# Patient Record
Sex: MALE | Race: WHITE | NOT HISPANIC OR LATINO | Employment: FULL TIME | URBAN - METROPOLITAN AREA
[De-identification: names, ages, dates, MRNs, and addresses within clinical notes are randomized per-mention and may not be internally consistent; named-entity substitution may affect disease eponyms.]

---

## 2018-05-10 ENCOUNTER — APPOINTMENT (OUTPATIENT)
Dept: GENERAL RADIOLOGY | Facility: CLINIC | Age: 53
DRG: 246 | End: 2018-05-10
Attending: EMERGENCY MEDICINE
Payer: COMMERCIAL

## 2018-05-10 ENCOUNTER — APPOINTMENT (OUTPATIENT)
Dept: CARDIOLOGY | Facility: CLINIC | Age: 53
DRG: 246 | End: 2018-05-10
Attending: EMERGENCY MEDICINE
Payer: COMMERCIAL

## 2018-05-10 ENCOUNTER — HOSPITAL ENCOUNTER (INPATIENT)
Facility: CLINIC | Age: 53
LOS: 3 days | Discharge: HOME | DRG: 246 | End: 2018-05-13
Attending: EMERGENCY MEDICINE | Admitting: INTERNAL MEDICINE
Payer: COMMERCIAL

## 2018-05-10 DIAGNOSIS — I46.9 CARDIAC ARREST WITH VENTRICULAR FIBRILLATION (H): ICD-10-CM

## 2018-05-10 DIAGNOSIS — I49.01 CARDIAC ARREST WITH VENTRICULAR FIBRILLATION (H): ICD-10-CM

## 2018-05-10 DIAGNOSIS — I21.19 ST ELEVATION MYOCARDIAL INFARCTION (STEMI) INVOLVING OTHER CORONARY ARTERY OF INFERIOR WALL (H): ICD-10-CM

## 2018-05-10 DIAGNOSIS — I95.2 HYPOTENSION DUE TO DRUGS: Primary | ICD-10-CM

## 2018-05-10 PROBLEM — I21.3 STEMI (ST ELEVATION MYOCARDIAL INFARCTION) (H): Status: ACTIVE | Noted: 2018-05-10

## 2018-05-10 LAB
ANION GAP SERPL CALCULATED.3IONS-SCNC: 8 MMOL/L (ref 3–14)
BASOPHILS # BLD AUTO: 0 10E9/L (ref 0–0.2)
BASOPHILS NFR BLD AUTO: 0.5 %
BUN SERPL-MCNC: 22 MG/DL (ref 7–30)
CALCIUM SERPL-MCNC: 8.9 MG/DL (ref 8.5–10.1)
CHLORIDE SERPL-SCNC: 107 MMOL/L (ref 94–109)
CHOLEST SERPL-MCNC: 199 MG/DL
CO2 SERPL-SCNC: 25 MMOL/L (ref 20–32)
CREAT SERPL-MCNC: 1.13 MG/DL (ref 0.66–1.25)
DIFFERENTIAL METHOD BLD: NORMAL
EOSINOPHIL # BLD AUTO: 0.1 10E9/L (ref 0–0.7)
EOSINOPHIL NFR BLD AUTO: 2.4 %
ERYTHROCYTE [DISTWIDTH] IN BLOOD BY AUTOMATED COUNT: 12.3 % (ref 10–15)
GFR SERPL CREATININE-BSD FRML MDRD: 68 ML/MIN/1.7M2
GLUCOSE SERPL-MCNC: 143 MG/DL (ref 70–99)
HCT VFR BLD AUTO: 42.6 % (ref 40–53)
HDLC SERPL-MCNC: 60 MG/DL
HGB BLD-MCNC: 15.1 G/DL (ref 13.3–17.7)
IMM GRANULOCYTES # BLD: 0 10E9/L (ref 0–0.4)
IMM GRANULOCYTES NFR BLD: 0.2 %
INTERPRETATION ECG - MUSE: NORMAL
KCT BLD-ACNC: 164 SEC (ref 75–150)
KCT BLD-ACNC: 192 SEC (ref 75–150)
KCT BLD-ACNC: 196 SEC (ref 75–150)
KCT BLD-ACNC: 237 SEC (ref 75–150)
KCT BLD-ACNC: 282 SEC (ref 75–150)
KCT BLD-ACNC: 375 SEC (ref 75–150)
LDLC SERPL CALC-MCNC: 133 MG/DL
LYMPHOCYTES # BLD AUTO: 2.5 10E9/L (ref 0.8–5.3)
LYMPHOCYTES NFR BLD AUTO: 42.1 %
MCH RBC QN AUTO: 30 PG (ref 26.5–33)
MCHC RBC AUTO-ENTMCNC: 35.4 G/DL (ref 31.5–36.5)
MCV RBC AUTO: 85 FL (ref 78–100)
MONOCYTES # BLD AUTO: 0.5 10E9/L (ref 0–1.3)
MONOCYTES NFR BLD AUTO: 9.1 %
NEUTROPHILS # BLD AUTO: 2.7 10E9/L (ref 1.6–8.3)
NEUTROPHILS NFR BLD AUTO: 45.7 %
NONHDLC SERPL-MCNC: 139 MG/DL
NRBC # BLD AUTO: 0 10*3/UL
NRBC BLD AUTO-RTO: 0 /100
PLATELET # BLD AUTO: 332 10E9/L (ref 150–450)
POTASSIUM SERPL-SCNC: 3.2 MMOL/L (ref 3.4–5.3)
POTASSIUM SERPL-SCNC: 3.8 MMOL/L (ref 3.4–5.3)
RBC # BLD AUTO: 5.03 10E12/L (ref 4.4–5.9)
SODIUM SERPL-SCNC: 140 MMOL/L (ref 133–144)
TRIGL SERPL-MCNC: 28 MG/DL
TROPONIN I SERPL-MCNC: 103.13 UG/L (ref 0–0.04)
TROPONIN I SERPL-MCNC: 68.33 UG/L (ref 0–0.04)
TROPONIN I SERPL-MCNC: 72.58 UG/L (ref 0–0.04)
TROPONIN I SERPL-MCNC: <0.015 UG/L (ref 0–0.04)
WBC # BLD AUTO: 5.9 10E9/L (ref 4–11)

## 2018-05-10 PROCEDURE — 93010 ELECTROCARDIOGRAM REPORT: CPT | Performed by: INTERNAL MEDICINE

## 2018-05-10 PROCEDURE — 27211089 ZZH KIT ACIST INJECTOR CR3

## 2018-05-10 PROCEDURE — 92921 ZZHC PRQ TRLUML CORONARY ANGIOPLASTY ADDL BRANCH: CPT | Mod: LC | Performed by: INTERNAL MEDICINE

## 2018-05-10 PROCEDURE — C1725 CATH, TRANSLUMIN NON-LASER: HCPCS

## 2018-05-10 PROCEDURE — 99233 SBSQ HOSP IP/OBS HIGH 50: CPT | Mod: 25 | Performed by: INTERNAL MEDICINE

## 2018-05-10 PROCEDURE — 27210758 ZZH DEVICE COMPRESSION CR8

## 2018-05-10 PROCEDURE — 25000128 H RX IP 250 OP 636: Performed by: INTERNAL MEDICINE

## 2018-05-10 PROCEDURE — 84484 ASSAY OF TROPONIN QUANT: CPT | Performed by: EMERGENCY MEDICINE

## 2018-05-10 PROCEDURE — 5A2204Z RESTORATION OF CARDIAC RHYTHM, SINGLE: ICD-10-PCS | Performed by: EMERGENCY MEDICINE

## 2018-05-10 PROCEDURE — 93458 L HRT ARTERY/VENTRICLE ANGIO: CPT

## 2018-05-10 PROCEDURE — 93458 L HRT ARTERY/VENTRICLE ANGIO: CPT | Mod: 26 | Performed by: INTERNAL MEDICINE

## 2018-05-10 PROCEDURE — 96375 TX/PRO/DX INJ NEW DRUG ADDON: CPT

## 2018-05-10 PROCEDURE — 25000128 H RX IP 250 OP 636: Performed by: EMERGENCY MEDICINE

## 2018-05-10 PROCEDURE — 92978 ENDOLUMINL IVUS OCT C 1ST: CPT

## 2018-05-10 PROCEDURE — B2151ZZ FLUOROSCOPY OF LEFT HEART USING LOW OSMOLAR CONTRAST: ICD-10-PCS | Performed by: INTERNAL MEDICINE

## 2018-05-10 PROCEDURE — 85025 COMPLETE CBC W/AUTO DIFF WBC: CPT | Performed by: EMERGENCY MEDICINE

## 2018-05-10 PROCEDURE — 80061 LIPID PANEL: CPT | Performed by: INTERNAL MEDICINE

## 2018-05-10 PROCEDURE — 85347 COAGULATION TIME ACTIVATED: CPT

## 2018-05-10 PROCEDURE — 93010 ELECTROCARDIOGRAM REPORT: CPT | Mod: 77 | Performed by: INTERNAL MEDICINE

## 2018-05-10 PROCEDURE — 84132 ASSAY OF SERUM POTASSIUM: CPT | Performed by: INTERNAL MEDICINE

## 2018-05-10 PROCEDURE — 02C03ZZ EXTIRPATION OF MATTER FROM CORONARY ARTERY, ONE ARTERY, PERCUTANEOUS APPROACH: ICD-10-PCS | Performed by: INTERNAL MEDICINE

## 2018-05-10 PROCEDURE — 5A12012 PERFORMANCE OF CARDIAC OUTPUT, SINGLE, MANUAL: ICD-10-PCS | Performed by: EMERGENCY MEDICINE

## 2018-05-10 PROCEDURE — 92941 PRQ TRLML REVSC TOT OCCL AMI: CPT | Mod: LC | Performed by: INTERNAL MEDICINE

## 2018-05-10 PROCEDURE — C1769 GUIDE WIRE: HCPCS

## 2018-05-10 PROCEDURE — C1887 CATHETER, GUIDING: HCPCS

## 2018-05-10 PROCEDURE — 99291 CRITICAL CARE FIRST HOUR: CPT | Performed by: INTERNAL MEDICINE

## 2018-05-10 PROCEDURE — 21000000 ZZH R&B IMCU HEART CARE

## 2018-05-10 PROCEDURE — 25000132 ZZH RX MED GY IP 250 OP 250 PS 637: Performed by: INTERNAL MEDICINE

## 2018-05-10 PROCEDURE — 92978 ENDOLUMINL IVUS OCT C 1ST: CPT | Mod: 26 | Performed by: INTERNAL MEDICINE

## 2018-05-10 PROCEDURE — 93005 ELECTROCARDIOGRAM TRACING: CPT

## 2018-05-10 PROCEDURE — 80048 BASIC METABOLIC PNL TOTAL CA: CPT | Performed by: EMERGENCY MEDICINE

## 2018-05-10 PROCEDURE — B2111ZZ FLUOROSCOPY OF MULTIPLE CORONARY ARTERIES USING LOW OSMOLAR CONTRAST: ICD-10-PCS | Performed by: INTERNAL MEDICINE

## 2018-05-10 PROCEDURE — 3E073PZ INTRODUCTION OF PLATELET INHIBITOR INTO CORONARY ARTERY, PERCUTANEOUS APPROACH: ICD-10-PCS | Performed by: INTERNAL MEDICINE

## 2018-05-10 PROCEDURE — 71045 X-RAY EXAM CHEST 1 VIEW: CPT

## 2018-05-10 PROCEDURE — 25000125 ZZHC RX 250: Performed by: INTERNAL MEDICINE

## 2018-05-10 PROCEDURE — 27210795 ZZH PAD DEFIB QUICK CR4

## 2018-05-10 PROCEDURE — C1874 STENT, COATED/COV W/DEL SYS: HCPCS

## 2018-05-10 PROCEDURE — 99152 MOD SED SAME PHYS/QHP 5/>YRS: CPT

## 2018-05-10 PROCEDURE — B221Z2Z COMPUTERIZED TOMOGRAPHY (CT SCAN) OF MULTIPLE CORONARY ARTERIES USING INTRAVASCULAR OPTICAL COHERENCE: ICD-10-PCS | Performed by: INTERNAL MEDICINE

## 2018-05-10 PROCEDURE — 99152 MOD SED SAME PHYS/QHP 5/>YRS: CPT | Mod: GC | Performed by: INTERNAL MEDICINE

## 2018-05-10 PROCEDURE — 02703ZZ DILATION OF CORONARY ARTERY, ONE ARTERY, PERCUTANEOUS APPROACH: ICD-10-PCS | Performed by: INTERNAL MEDICINE

## 2018-05-10 PROCEDURE — 99292 CRITICAL CARE ADDL 30 MIN: CPT | Performed by: INTERNAL MEDICINE

## 2018-05-10 PROCEDURE — 27210946 ZZH KIT HC TOTES DISP CR8

## 2018-05-10 PROCEDURE — 027034Z DILATION OF CORONARY ARTERY, ONE ARTERY WITH DRUG-ELUTING INTRALUMINAL DEVICE, PERCUTANEOUS APPROACH: ICD-10-PCS | Performed by: INTERNAL MEDICINE

## 2018-05-10 PROCEDURE — 92921 ZZHC PRQ TRLUML CORONARY ANGIOPLASTY ADDL BRANCH: CPT | Mod: LC

## 2018-05-10 PROCEDURE — 27211049 ZZH CATH OCT CR21

## 2018-05-10 PROCEDURE — C1757 CATH, THROMBECTOMY/EMBOLECT: HCPCS

## 2018-05-10 PROCEDURE — 27210759 ZZH DEVICE INFLATION CR6

## 2018-05-10 PROCEDURE — 99285 EMERGENCY DEPT VISIT HI MDM: CPT | Mod: 25

## 2018-05-10 PROCEDURE — 99153 MOD SED SAME PHYS/QHP EA: CPT

## 2018-05-10 PROCEDURE — 84484 ASSAY OF TROPONIN QUANT: CPT | Performed by: INTERNAL MEDICINE

## 2018-05-10 PROCEDURE — 4A023N7 MEASUREMENT OF CARDIAC SAMPLING AND PRESSURE, LEFT HEART, PERCUTANEOUS APPROACH: ICD-10-PCS | Performed by: INTERNAL MEDICINE

## 2018-05-10 PROCEDURE — 96374 THER/PROPH/DIAG INJ IV PUSH: CPT

## 2018-05-10 PROCEDURE — C9606 PERC D-E COR REVASC W AMI S: HCPCS | Mod: LC

## 2018-05-10 PROCEDURE — 36415 COLL VENOUS BLD VENIPUNCTURE: CPT | Performed by: INTERNAL MEDICINE

## 2018-05-10 RX ORDER — DOPAMINE HYDROCHLORIDE 160 MG/100ML
2-20 INJECTION, SOLUTION INTRAVENOUS CONTINUOUS PRN
Status: DISCONTINUED | OUTPATIENT
Start: 2018-05-10 | End: 2018-05-10 | Stop reason: HOSPADM

## 2018-05-10 RX ORDER — POTASSIUM CHLORIDE 7.45 MG/ML
10 INJECTION INTRAVENOUS
Status: DISCONTINUED | OUTPATIENT
Start: 2018-05-10 | End: 2018-05-10 | Stop reason: HOSPADM

## 2018-05-10 RX ORDER — FUROSEMIDE 10 MG/ML
20-100 INJECTION INTRAMUSCULAR; INTRAVENOUS
Status: DISCONTINUED | OUTPATIENT
Start: 2018-05-10 | End: 2018-05-10 | Stop reason: HOSPADM

## 2018-05-10 RX ORDER — METHYLPREDNISOLONE SODIUM SUCCINATE 125 MG/2ML
125 INJECTION, POWDER, LYOPHILIZED, FOR SOLUTION INTRAMUSCULAR; INTRAVENOUS
Status: DISCONTINUED | OUTPATIENT
Start: 2018-05-10 | End: 2018-05-10 | Stop reason: HOSPADM

## 2018-05-10 RX ORDER — HEPARIN SODIUM 1000 [USP'U]/ML
1000-10000 INJECTION, SOLUTION INTRAVENOUS; SUBCUTANEOUS EVERY 5 MIN PRN
Status: DISCONTINUED | OUTPATIENT
Start: 2018-05-10 | End: 2018-05-10 | Stop reason: HOSPADM

## 2018-05-10 RX ORDER — POTASSIUM CHLORIDE 29.8 MG/ML
20 INJECTION INTRAVENOUS
Status: DISCONTINUED | OUTPATIENT
Start: 2018-05-10 | End: 2018-05-10 | Stop reason: HOSPADM

## 2018-05-10 RX ORDER — SODIUM CHLORIDE 9 MG/ML
INJECTION, SOLUTION INTRAVENOUS CONTINUOUS
Status: ACTIVE | OUTPATIENT
Start: 2018-05-10 | End: 2018-05-10

## 2018-05-10 RX ORDER — LIDOCAINE HYDROCHLORIDE 10 MG/ML
1-10 INJECTION, SOLUTION EPIDURAL; INFILTRATION; INTRACAUDAL; PERINEURAL
Status: COMPLETED | OUTPATIENT
Start: 2018-05-10 | End: 2018-05-10

## 2018-05-10 RX ORDER — ACETAMINOPHEN 325 MG/1
325-650 TABLET ORAL EVERY 4 HOURS PRN
Status: DISCONTINUED | OUTPATIENT
Start: 2018-05-10 | End: 2018-05-13 | Stop reason: HOSPADM

## 2018-05-10 RX ORDER — LISINOPRIL 5 MG/1
5 TABLET ORAL DAILY
Status: DISCONTINUED | OUTPATIENT
Start: 2018-05-10 | End: 2018-05-13 | Stop reason: HOSPADM

## 2018-05-10 RX ORDER — FENTANYL CITRATE 50 UG/ML
25-50 INJECTION, SOLUTION INTRAMUSCULAR; INTRAVENOUS
Status: DISCONTINUED | OUTPATIENT
Start: 2018-05-10 | End: 2018-05-10 | Stop reason: HOSPADM

## 2018-05-10 RX ORDER — FLUMAZENIL 0.1 MG/ML
0.2 INJECTION, SOLUTION INTRAVENOUS
Status: ACTIVE | OUTPATIENT
Start: 2018-05-10 | End: 2018-05-10

## 2018-05-10 RX ORDER — DOBUTAMINE HYDROCHLORIDE 200 MG/100ML
2-20 INJECTION INTRAVENOUS CONTINUOUS PRN
Status: DISCONTINUED | OUTPATIENT
Start: 2018-05-10 | End: 2018-05-10 | Stop reason: HOSPADM

## 2018-05-10 RX ORDER — ONDANSETRON 2 MG/ML
4 INJECTION INTRAMUSCULAR; INTRAVENOUS EVERY 4 HOURS PRN
Status: DISCONTINUED | OUTPATIENT
Start: 2018-05-10 | End: 2018-05-10 | Stop reason: HOSPADM

## 2018-05-10 RX ORDER — SODIUM NITROPRUSSIDE 25 MG/ML
100-200 INJECTION INTRAVENOUS
Status: DISCONTINUED | OUTPATIENT
Start: 2018-05-10 | End: 2018-05-10 | Stop reason: HOSPADM

## 2018-05-10 RX ORDER — VERAPAMIL HYDROCHLORIDE 2.5 MG/ML
1-2.5 INJECTION, SOLUTION INTRAVENOUS
Status: DISCONTINUED | OUTPATIENT
Start: 2018-05-10 | End: 2018-05-10 | Stop reason: HOSPADM

## 2018-05-10 RX ORDER — FLUMAZENIL 0.1 MG/ML
0.2 INJECTION, SOLUTION INTRAVENOUS
Status: DISCONTINUED | OUTPATIENT
Start: 2018-05-10 | End: 2018-05-10 | Stop reason: HOSPADM

## 2018-05-10 RX ORDER — MORPHINE SULFATE 4 MG/ML
2-4 INJECTION, SOLUTION INTRAMUSCULAR; INTRAVENOUS EVERY 4 HOURS PRN
Status: DISCONTINUED | OUTPATIENT
Start: 2018-05-10 | End: 2018-05-13 | Stop reason: HOSPADM

## 2018-05-10 RX ORDER — DIPHENHYDRAMINE HYDROCHLORIDE 50 MG/ML
25-50 INJECTION INTRAMUSCULAR; INTRAVENOUS
Status: DISCONTINUED | OUTPATIENT
Start: 2018-05-10 | End: 2018-05-10 | Stop reason: HOSPADM

## 2018-05-10 RX ORDER — NITROGLYCERIN 5 MG/ML
100-500 VIAL (ML) INTRAVENOUS
Status: DISCONTINUED | OUTPATIENT
Start: 2018-05-10 | End: 2018-05-10 | Stop reason: HOSPADM

## 2018-05-10 RX ORDER — PRASUGREL 10 MG/1
10-60 TABLET, FILM COATED ORAL
Status: DISCONTINUED | OUTPATIENT
Start: 2018-05-10 | End: 2018-05-10 | Stop reason: HOSPADM

## 2018-05-10 RX ORDER — CLOPIDOGREL 300 MG/1
300-600 TABLET, FILM COATED ORAL
Status: DISCONTINUED | OUTPATIENT
Start: 2018-05-10 | End: 2018-05-10 | Stop reason: HOSPADM

## 2018-05-10 RX ORDER — PHENYLEPHRINE HCL IN 0.9% NACL 1 MG/10 ML
20-100 SYRINGE (ML) INTRAVENOUS
Status: DISCONTINUED | OUTPATIENT
Start: 2018-05-10 | End: 2018-05-10 | Stop reason: HOSPADM

## 2018-05-10 RX ORDER — ATROPINE SULFATE 0.1 MG/ML
0.5 INJECTION INTRAVENOUS EVERY 5 MIN PRN
Status: DISPENSED | OUTPATIENT
Start: 2018-05-10 | End: 2018-05-10

## 2018-05-10 RX ORDER — ADENOSINE 3 MG/ML
12-12000 INJECTION, SOLUTION INTRAVENOUS
Status: DISCONTINUED | OUTPATIENT
Start: 2018-05-10 | End: 2018-05-10 | Stop reason: HOSPADM

## 2018-05-10 RX ORDER — EPTIFIBATIDE 2 MG/ML
180 INJECTION, SOLUTION INTRAVENOUS ONCE
Status: COMPLETED | OUTPATIENT
Start: 2018-05-10 | End: 2018-05-10

## 2018-05-10 RX ORDER — LORAZEPAM 2 MG/ML
.5-2 INJECTION INTRAMUSCULAR EVERY 4 HOURS PRN
Status: DISCONTINUED | OUTPATIENT
Start: 2018-05-10 | End: 2018-05-10 | Stop reason: HOSPADM

## 2018-05-10 RX ORDER — ASPIRIN 81 MG/1
81 TABLET ORAL DAILY
Status: DISCONTINUED | OUTPATIENT
Start: 2018-05-11 | End: 2018-05-13 | Stop reason: HOSPADM

## 2018-05-10 RX ORDER — DEXTROSE MONOHYDRATE 25 G/50ML
12.5-5 INJECTION, SOLUTION INTRAVENOUS EVERY 30 MIN PRN
Status: DISCONTINUED | OUTPATIENT
Start: 2018-05-10 | End: 2018-05-10 | Stop reason: HOSPADM

## 2018-05-10 RX ORDER — ASPIRIN 81 MG/1
81-324 TABLET, CHEWABLE ORAL
Status: DISCONTINUED | OUTPATIENT
Start: 2018-05-10 | End: 2018-05-10 | Stop reason: HOSPADM

## 2018-05-10 RX ORDER — LIDOCAINE HYDROCHLORIDE 10 MG/ML
30 INJECTION, SOLUTION EPIDURAL; INFILTRATION; INTRACAUDAL; PERINEURAL
Status: DISCONTINUED | OUTPATIENT
Start: 2018-05-10 | End: 2018-05-10 | Stop reason: HOSPADM

## 2018-05-10 RX ORDER — PROMETHAZINE HYDROCHLORIDE 25 MG/ML
6.25-25 INJECTION, SOLUTION INTRAMUSCULAR; INTRAVENOUS EVERY 4 HOURS PRN
Status: DISCONTINUED | OUTPATIENT
Start: 2018-05-10 | End: 2018-05-10 | Stop reason: HOSPADM

## 2018-05-10 RX ORDER — PROTAMINE SULFATE 10 MG/ML
25-100 INJECTION, SOLUTION INTRAVENOUS EVERY 5 MIN PRN
Status: DISCONTINUED | OUTPATIENT
Start: 2018-05-10 | End: 2018-05-10 | Stop reason: HOSPADM

## 2018-05-10 RX ORDER — BUPIVACAINE HYDROCHLORIDE 2.5 MG/ML
1-10 INJECTION, SOLUTION EPIDURAL; INFILTRATION; INTRACAUDAL
Status: DISCONTINUED | OUTPATIENT
Start: 2018-05-10 | End: 2018-05-10 | Stop reason: HOSPADM

## 2018-05-10 RX ORDER — ENALAPRILAT 1.25 MG/ML
1.25-2.5 INJECTION INTRAVENOUS
Status: DISCONTINUED | OUTPATIENT
Start: 2018-05-10 | End: 2018-05-10 | Stop reason: HOSPADM

## 2018-05-10 RX ORDER — HYDROCODONE BITARTRATE AND ACETAMINOPHEN 5; 325 MG/1; MG/1
1-2 TABLET ORAL EVERY 4 HOURS PRN
Status: DISCONTINUED | OUTPATIENT
Start: 2018-05-10 | End: 2018-05-10 | Stop reason: CLARIF

## 2018-05-10 RX ORDER — ASPIRIN 81 MG/1
324 TABLET, CHEWABLE ORAL ONCE
Status: DISCONTINUED | OUTPATIENT
Start: 2018-05-10 | End: 2018-05-10

## 2018-05-10 RX ORDER — HYDRALAZINE HYDROCHLORIDE 20 MG/ML
10-20 INJECTION INTRAMUSCULAR; INTRAVENOUS
Status: DISCONTINUED | OUTPATIENT
Start: 2018-05-10 | End: 2018-05-10 | Stop reason: HOSPADM

## 2018-05-10 RX ORDER — MORPHINE SULFATE 4 MG/ML
4 INJECTION, SOLUTION INTRAMUSCULAR; INTRAVENOUS ONCE
Status: COMPLETED | OUTPATIENT
Start: 2018-05-10 | End: 2018-05-10

## 2018-05-10 RX ORDER — CLOPIDOGREL BISULFATE 75 MG/1
75 TABLET ORAL
Status: DISCONTINUED | OUTPATIENT
Start: 2018-05-10 | End: 2018-05-10 | Stop reason: HOSPADM

## 2018-05-10 RX ORDER — NIFEDIPINE 10 MG/1
10 CAPSULE ORAL
Status: DISCONTINUED | OUTPATIENT
Start: 2018-05-10 | End: 2018-05-10 | Stop reason: HOSPADM

## 2018-05-10 RX ORDER — NALOXONE HYDROCHLORIDE 0.4 MG/ML
.1-.4 INJECTION, SOLUTION INTRAMUSCULAR; INTRAVENOUS; SUBCUTANEOUS
Status: DISCONTINUED | OUTPATIENT
Start: 2018-05-10 | End: 2018-05-13 | Stop reason: HOSPADM

## 2018-05-10 RX ORDER — POTASSIUM CHLORIDE 1500 MG/1
20-40 TABLET, EXTENDED RELEASE ORAL
Status: DISCONTINUED | OUTPATIENT
Start: 2018-05-10 | End: 2018-05-13 | Stop reason: HOSPADM

## 2018-05-10 RX ORDER — MORPHINE SULFATE 2 MG/ML
1-2 INJECTION, SOLUTION INTRAMUSCULAR; INTRAVENOUS EVERY 5 MIN PRN
Status: DISCONTINUED | OUTPATIENT
Start: 2018-05-10 | End: 2018-05-10 | Stop reason: HOSPADM

## 2018-05-10 RX ORDER — CARVEDILOL 3.12 MG/1
3.12 TABLET ORAL ONCE
Status: COMPLETED | OUTPATIENT
Start: 2018-05-10 | End: 2018-05-10

## 2018-05-10 RX ORDER — POTASSIUM CHLORIDE 7.45 MG/ML
10 INJECTION INTRAVENOUS
Status: DISCONTINUED | OUTPATIENT
Start: 2018-05-10 | End: 2018-05-13 | Stop reason: HOSPADM

## 2018-05-10 RX ORDER — NICARDIPINE HYDROCHLORIDE 2.5 MG/ML
100 INJECTION INTRAVENOUS
Status: DISCONTINUED | OUTPATIENT
Start: 2018-05-10 | End: 2018-05-10 | Stop reason: HOSPADM

## 2018-05-10 RX ORDER — NALOXONE HYDROCHLORIDE 0.4 MG/ML
0.4 INJECTION, SOLUTION INTRAMUSCULAR; INTRAVENOUS; SUBCUTANEOUS EVERY 5 MIN PRN
Status: DISCONTINUED | OUTPATIENT
Start: 2018-05-10 | End: 2018-05-10 | Stop reason: HOSPADM

## 2018-05-10 RX ORDER — ASPIRIN 325 MG
325 TABLET ORAL
Status: DISCONTINUED | OUTPATIENT
Start: 2018-05-10 | End: 2018-05-10 | Stop reason: HOSPADM

## 2018-05-10 RX ORDER — METOPROLOL TARTRATE 1 MG/ML
5 INJECTION, SOLUTION INTRAVENOUS EVERY 5 MIN PRN
Status: DISCONTINUED | OUTPATIENT
Start: 2018-05-10 | End: 2018-05-10 | Stop reason: HOSPADM

## 2018-05-10 RX ORDER — NITROGLYCERIN 20 MG/100ML
0.07-2 INJECTION INTRAVENOUS CONTINUOUS
Status: DISCONTINUED | OUTPATIENT
Start: 2018-05-10 | End: 2018-05-11

## 2018-05-10 RX ORDER — ATROPINE SULFATE 0.1 MG/ML
.5-1 INJECTION INTRAVENOUS
Status: DISCONTINUED | OUTPATIENT
Start: 2018-05-10 | End: 2018-05-10 | Stop reason: HOSPADM

## 2018-05-10 RX ORDER — FENTANYL CITRATE 50 UG/ML
25-50 INJECTION, SOLUTION INTRAMUSCULAR; INTRAVENOUS
Status: DISPENSED | OUTPATIENT
Start: 2018-05-10 | End: 2018-05-10

## 2018-05-10 RX ORDER — POTASSIUM CHLORIDE 29.8 MG/ML
20 INJECTION INTRAVENOUS
Status: DISCONTINUED | OUTPATIENT
Start: 2018-05-10 | End: 2018-05-13 | Stop reason: HOSPADM

## 2018-05-10 RX ORDER — EPINEPHRINE 1 MG/ML
0.3 INJECTION, SOLUTION, CONCENTRATE INTRAVENOUS
Status: DISCONTINUED | OUTPATIENT
Start: 2018-05-10 | End: 2018-05-10 | Stop reason: HOSPADM

## 2018-05-10 RX ORDER — CARVEDILOL 3.12 MG/1
3.12 TABLET ORAL 2 TIMES DAILY
Status: DISCONTINUED | OUTPATIENT
Start: 2018-05-10 | End: 2018-05-10

## 2018-05-10 RX ORDER — NITROGLYCERIN 0.4 MG/1
0.4 TABLET SUBLINGUAL EVERY 5 MIN PRN
Status: DISCONTINUED | OUTPATIENT
Start: 2018-05-10 | End: 2018-05-10 | Stop reason: HOSPADM

## 2018-05-10 RX ORDER — CARVEDILOL 6.25 MG/1
6.25 TABLET ORAL 2 TIMES DAILY
Status: DISCONTINUED | OUTPATIENT
Start: 2018-05-11 | End: 2018-05-12

## 2018-05-10 RX ORDER — NITROGLYCERIN 5 MG/ML
100-200 VIAL (ML) INTRAVENOUS
Status: DISCONTINUED | OUTPATIENT
Start: 2018-05-10 | End: 2018-05-10 | Stop reason: HOSPADM

## 2018-05-10 RX ORDER — POTASSIUM CHLORIDE 1.5 G/1.58G
20-40 POWDER, FOR SOLUTION ORAL
Status: DISCONTINUED | OUTPATIENT
Start: 2018-05-10 | End: 2018-05-13 | Stop reason: HOSPADM

## 2018-05-10 RX ORDER — POTASSIUM CL/LIDO/0.9 % NACL 10MEQ/0.1L
10 INTRAVENOUS SOLUTION, PIGGYBACK (ML) INTRAVENOUS
Status: DISCONTINUED | OUTPATIENT
Start: 2018-05-10 | End: 2018-05-13 | Stop reason: HOSPADM

## 2018-05-10 RX ORDER — NITROGLYCERIN 20 MG/100ML
.07-2 INJECTION INTRAVENOUS CONTINUOUS PRN
Status: DISCONTINUED | OUTPATIENT
Start: 2018-05-10 | End: 2018-05-10 | Stop reason: HOSPADM

## 2018-05-10 RX ORDER — POTASSIUM CHLORIDE 7.45 MG/ML
10 INJECTION INTRAVENOUS ONCE
Status: DISCONTINUED | OUTPATIENT
Start: 2018-05-10 | End: 2018-05-10

## 2018-05-10 RX ORDER — ROSUVASTATIN CALCIUM 20 MG/1
20 TABLET, COATED ORAL DAILY
Status: DISCONTINUED | OUTPATIENT
Start: 2018-05-10 | End: 2018-05-13 | Stop reason: HOSPADM

## 2018-05-10 RX ORDER — PROTAMINE SULFATE 10 MG/ML
1-5 INJECTION, SOLUTION INTRAVENOUS
Status: DISCONTINUED | OUTPATIENT
Start: 2018-05-10 | End: 2018-05-10 | Stop reason: HOSPADM

## 2018-05-10 RX ORDER — NITROGLYCERIN 0.4 MG/1
0.4 TABLET SUBLINGUAL EVERY 5 MIN PRN
Status: DISCONTINUED | OUTPATIENT
Start: 2018-05-10 | End: 2018-05-13 | Stop reason: HOSPADM

## 2018-05-10 RX ORDER — IOPAMIDOL 755 MG/ML
275 INJECTION, SOLUTION INTRAVASCULAR ONCE
Status: COMPLETED | OUTPATIENT
Start: 2018-05-10 | End: 2018-05-10

## 2018-05-10 RX ORDER — POTASSIUM CL/LIDO/0.9 % NACL 10MEQ/0.1L
10 INTRAVENOUS SOLUTION, PIGGYBACK (ML) INTRAVENOUS ONCE
Status: COMPLETED | OUTPATIENT
Start: 2018-05-10 | End: 2018-05-10

## 2018-05-10 RX ORDER — NALOXONE HYDROCHLORIDE 0.4 MG/ML
.2-.4 INJECTION, SOLUTION INTRAMUSCULAR; INTRAVENOUS; SUBCUTANEOUS
Status: DISCONTINUED | OUTPATIENT
Start: 2018-05-10 | End: 2018-05-10

## 2018-05-10 RX ADMIN — EPTIFIBATIDE 16980 MCG: 2 INJECTION, SOLUTION INTRAVENOUS at 09:39

## 2018-05-10 RX ADMIN — FENTANYL CITRATE 25 MCG: 50 INJECTION, SOLUTION INTRAMUSCULAR; INTRAVENOUS at 09:49

## 2018-05-10 RX ADMIN — CARVEDILOL 3.12 MG: 3.12 TABLET, FILM COATED ORAL at 20:22

## 2018-05-10 RX ADMIN — POTASSIUM CHLORIDE 40 MEQ: 1500 TABLET, EXTENDED RELEASE ORAL at 13:53

## 2018-05-10 RX ADMIN — SODIUM NITROPRUSSIDE 100 MCG: 25 INJECTION INTRAVENOUS at 09:44

## 2018-05-10 RX ADMIN — TICAGRELOR 90 MG: 90 TABLET ORAL at 20:22

## 2018-05-10 RX ADMIN — LIDOCAINE HYDROCHLORIDE 10 ML: 10 INJECTION, SOLUTION EPIDURAL; INFILTRATION; INTRACAUDAL; PERINEURAL at 08:30

## 2018-05-10 RX ADMIN — ONDANSETRON 4 MG: 2 INJECTION INTRAMUSCULAR; INTRAVENOUS at 08:39

## 2018-05-10 RX ADMIN — HYDROCODONE BITARTRATE AND ACETAMINOPHEN 1 TABLET: 5; 325 TABLET ORAL at 15:37

## 2018-05-10 RX ADMIN — HYDROCODONE BITARTRATE AND ACETAMINOPHEN 1 TABLET: 5; 325 TABLET ORAL at 13:02

## 2018-05-10 RX ADMIN — FENTANYL CITRATE 25 MCG: 50 INJECTION, SOLUTION INTRAMUSCULAR; INTRAVENOUS at 09:16

## 2018-05-10 RX ADMIN — HEPARIN SODIUM 9000 UNITS: 1000 INJECTION, SOLUTION INTRAVENOUS; SUBCUTANEOUS at 08:38

## 2018-05-10 RX ADMIN — HEPARIN SODIUM 4000 UNITS: 1000 INJECTION, SOLUTION INTRAVENOUS; SUBCUTANEOUS at 09:32

## 2018-05-10 RX ADMIN — FENTANYL CITRATE 50 MCG: 50 INJECTION, SOLUTION INTRAMUSCULAR; INTRAVENOUS at 08:41

## 2018-05-10 RX ADMIN — HEPARIN SODIUM 6000 UNITS: 1000 INJECTION, SOLUTION INTRAVENOUS; SUBCUTANEOUS at 08:10

## 2018-05-10 RX ADMIN — ROSUVASTATIN CALCIUM 20 MG: 20 TABLET, FILM COATED ORAL at 20:25

## 2018-05-10 RX ADMIN — MORPHINE SULFATE 2 MG: 4 INJECTION, SOLUTION INTRAMUSCULAR; INTRAVENOUS at 21:11

## 2018-05-10 RX ADMIN — SODIUM NITROPRUSSIDE 200 MCG: 25 INJECTION INTRAVENOUS at 09:09

## 2018-05-10 RX ADMIN — IOPAMIDOL 275 ML: 755 INJECTION, SOLUTION INTRAVASCULAR at 10:00

## 2018-05-10 RX ADMIN — FENTANYL CITRATE 50 MCG: 50 INJECTION, SOLUTION INTRAMUSCULAR; INTRAVENOUS at 09:35

## 2018-05-10 RX ADMIN — ADENOSINE 480 MCG: 3 INJECTION, SOLUTION INTRAVENOUS at 09:09

## 2018-05-10 RX ADMIN — FENTANYL CITRATE 25 MCG: 50 INJECTION INTRAMUSCULAR; INTRAVENOUS at 14:26

## 2018-05-10 RX ADMIN — POTASSIUM CHLORIDE 20 MEQ: 1500 TABLET, EXTENDED RELEASE ORAL at 16:24

## 2018-05-10 RX ADMIN — MORPHINE SULFATE 4 MG: 4 INJECTION, SOLUTION INTRAMUSCULAR; INTRAVENOUS at 08:07

## 2018-05-10 RX ADMIN — ADENOSINE 240 MCG: 3 INJECTION, SOLUTION INTRAVENOUS at 09:44

## 2018-05-10 RX ADMIN — MIDAZOLAM 1 MG: 1 INJECTION INTRAMUSCULAR; INTRAVENOUS at 08:57

## 2018-05-10 RX ADMIN — LISINOPRIL 5 MG: 5 TABLET ORAL at 12:33

## 2018-05-10 RX ADMIN — TICAGRELOR 180 MG: 90 TABLET ORAL at 08:30

## 2018-05-10 RX ADMIN — METOPROLOL TARTRATE 1 MG: 5 INJECTION INTRAVENOUS at 08:44

## 2018-05-10 RX ADMIN — FENTANYL CITRATE 50 MCG: 50 INJECTION, SOLUTION INTRAMUSCULAR; INTRAVENOUS at 08:25

## 2018-05-10 RX ADMIN — CARVEDILOL 3.12 MG: 3.12 TABLET, FILM COATED ORAL at 21:11

## 2018-05-10 RX ADMIN — NITROGLYCERIN 0.07 MCG/KG/MIN: 20 INJECTION INTRAVENOUS at 17:45

## 2018-05-10 RX ADMIN — POTASSIUM CHLORIDE 10 MEQ: 149 INJECTION, SOLUTION, CONCENTRATE INTRAVENOUS at 09:33

## 2018-05-10 RX ADMIN — CARVEDILOL 3.12 MG: 3.12 TABLET, FILM COATED ORAL at 12:32

## 2018-05-10 ASSESSMENT — PAIN DESCRIPTION - DESCRIPTORS
DESCRIPTORS: PRESSURE

## 2018-05-10 ASSESSMENT — ENCOUNTER SYMPTOMS: SHORTNESS OF BREATH: 1

## 2018-05-10 NOTE — PROGRESS NOTES
Service accept note.  Discussed with Dr. Carter and discussed with patient and his extended family.  Patient either had ruptured plaque and thrombosis versus demand ischemia driven thrombosis when exercising to a high level today.  OM 2 and OM 1 revascularized.  Slow reflow likely due to microvascular damage.  Will medically manage through infarct.  Sizable troponin elevation.  Likely will have prominent lateral wall damage.  Discussed with the patient that he may be fairly asymptomatic from the damage in the long-term.  Will keep his blood pressures down and maximize medical management to help remodeling.  Will also keep track of his mitral valve to avoid ischemic mitral regurgitation and LV dilatation.  Will continue to follow patient while he is here.  Ongoing ventricular arrhythmias, if recurrent ventricular tachycardia may consider either increasing beta blockers or adding amiodarone.    Over 40 minutes spent total in counseling and coordination of patient care including chart review and review of angiogram

## 2018-05-10 NOTE — IP AVS SNAPSHOT
[facility]    [address] [place] [address]., [address]    [place] [address]    Phone:  [phone]                                       After Visit Summary   5/10/2018    Braulio Bunch    MRN: [id number]           After Visit Summary Signature Page     I have received my discharge instructions, and my questions have been answered. I have discussed any challenges I see with this plan with the nurse or doctor.    ..........................................................................................................................................  Patient/Patient Representative Signature      ..........................................................................................................................................  Patient Representative Print Name and Relationship to Patient    ..................................................               ................................................  Date                                            Time    ..........................................................................................................................................  Reviewed by Signature/Title    ...................................................              ..............................................  Date                                                            Time

## 2018-05-10 NOTE — IP AVS SNAPSHOT
MRN:[id number]                      After Visit Summary   5/10/2018    Braulio Bunch    MRN: [id number]           Thank you!     Thank you for choosing [facility] for your care. Our goal is always to provide you with excellent care. Hearing back from our patients is one way we can continue to improve our services. Please take a few minutes to complete the written survey that you may receive in the mail after you visit with us. Thank you!        Patient Information     Date Of Birth          1965        Designated Caregiver       Most Recent Value    Caregiver    Will someone help with your care after discharge? yes    Name of designated caregiver Cande     Phone number of caregiver see chart     Caregiver address see chart       About your hospital stay     You were admitted on:  May 10, 2018 You last received care in the:  [facility]    You were discharged on:  May 13, 2018       Who to Call     For medical emergencies, please call 911.  For non-urgent questions about your medical care, please call your primary care provider or clinic, [phone]          Attending Provider     Provider Specialty    Carson Raymundo DO Emergency Medicine    Man Shetty MD Cardiology    [facility], Braulio ULRICH MD Cardiology       Primary Care Provider Office Phone # Fax #    Josué Vaughn [phone]      After Care Instructions     Activity       Your activity upon discharge: activity as tolerated            Diet       Follow this diet upon discharge: Orders Placed This Encounter      Low Saturated Fat Na <2400 mg                  Follow-up Appointments     Follow-up and recommended labs and tests        Follow up with primary care provider, Josué Vaughn, within 7-14 days, for hospital follow- up. No follow up labs or test are needed.                  Your next 10 appointments already scheduled     May 15, 2018 10:00 AM CDT   Cardiac  Evaluation with  CARDIAC REHAB 5   [facility] ([facility])    [address]  [place] [address]   [phone]            May 25, 2018  9:50 AM CDT   LAB with [facility]   [facility] [place] ([facility])    [address]  [place]  [place] [address]   [phone]           Please do not eat 10-12 hours before your appointment if you are coming in fasting for labs on lipids, cholesterol, or glucose (sugar). This does not apply to pregnant women. Water, hot tea and black coffee (with nothing added) are okay. Do not drink other fluids, diet soda or chew gum.            May 25, 2018 10:50 AM CDT   Return Discharge with ENRIKE Crocker CNP   [facility] ([facility])    [address]  [place] [address] [phone] OPT 2            Jul 30, 2018  8:15 AM CDT   Return Visit with Jeramy Carter MD   [facility] ([facility])    [address]  [place] [address] [phone] OPT 2              Additional Services     CARDIAC REHAB REFERRAL       Patient may choose their preference of the site for Cardiac Rehab.            Follow-Up with Cardiac Advanced Practice Provider           Follow-Up with Cardiologist                 Future tests that were ordered for you     Basic metabolic panel                 Further instructions from your care team       Follow up with primary care doctor in 7-10 days. Inform of this hospitalization and new medications.    Pending Results     Date and Time Order Name Status Description    5/11/2018 1536 EKG 12-lead, complete Preliminary     5/11/2018 0814 EKG 12-lead, tracing only Preliminary     5/11/2018 0509 EKG 12-lead, tracing only - STAT Preliminary     5/10/2018 1220 EKG 12-lead, tracing only Preliminary     5/10/2018 1038 EKG  "12-lead, tracing only  Preliminary             Statement of Approval     Ordered          18 0934  I have reviewed and agree with all the recommendations and orders detailed in this document.  EFFECTIVE NOW     Approved and electronically signed by:  Braulio Reyes MD             Admission Information     Date & Time Provider Department Dept. Phone    5/10/2018 Braulio Reyes MD [facility] [phone]      Your Vitals Were     Blood Pressure Pulse Temperature Respirations Height Weight    113/70 63 98.5  F (36.9  C) (Oral) 16 1.854 m (6' 1\") 90.2 kg (198 lb 14.4 oz)    Pulse Oximetry BMI (Body Mass Index)                95% 26.24 kg/m2          MyChart Information     [employer] lets you send messages to your doctor, view your test results, renew your prescriptions, schedule appointments and more. To sign up, go to [email or web address] . Click on \"Log in\" on the left side of the screen, which will take you to the Welcome page. Then click on \"Sign up Now\" on the right side of the page.     You will be asked to enter the access code listed below, as well as some personal information. Please follow the directions to create your username and password.     Your access code is: [email or web address]  Expires: 2018  9:58 AM     Your access code will  in 90 days. If you need help or a new code, please call your [facility] clinic or [phone].        Care EveryWhere ID     This is your Care EveryWhere ID. This could be used by other organizations to access your [facility] medical records  [id number]        Equal Access to Services     [facility] : Hadii moshe Sanchez, waaxda luqadaha, qaybta kaalmasera levin. So [facility] [phone].    ATENCIÓN: Si habla español, tiene a jean baptiste disposición servicios gratuitos de asistencia lingüística. Llame al [phone].    We comply with applicable federal civil rights laws and [place] " laws. We do not discriminate on the basis of race, color, national origin, age, disability, sex, sexual orientation, or gender identity.               Review of your medicines      START taking        Dose / Directions    aspirin 81 MG EC tablet   Used for:  ST elevation myocardial infarction (STEMI) involving other coronary artery of inferior wall (H)        Dose:  81 mg   Start taking on:  5/14/2018   Take 1 tablet (81 mg) by mouth daily   Quantity:  30 tablet   Refills:  11       carvedilol 3.125 MG tablet   Commonly known as:  COREG   Used for:  ST elevation myocardial infarction (STEMI) involving other coronary artery of inferior wall (H)        Dose:  3.125 mg   Take 1 tablet (3.125 mg) by mouth 2 times daily (with meals)   Quantity:  180 tablet   Refills:  3       lisinopril 5 MG tablet   Commonly known as:  PRINIVIL/ZESTRIL   Used for:  ST elevation myocardial infarction (STEMI) involving other coronary artery of inferior wall (H)        Dose:  5 mg   Start taking on:  5/14/2018   Take 1 tablet (5 mg) by mouth daily   Quantity:  90 tablet   Refills:  3       nitroGLYcerin 0.4 MG sublingual tablet   Commonly known as:  NITROSTAT   Used for:  ST elevation myocardial infarction (STEMI) involving other coronary artery of inferior wall (H)        For chest pain place 1 tablet under the tongue every 5 minutes for 3 doses. If symptoms persist 5 minutes after 1st dose call 911.   Quantity:  25 tablet   Refills:  3       ranolazine 500 MG 12 hr tablet   Commonly known as:  RANEXA   Used for:  ST elevation myocardial infarction (STEMI) involving other coronary artery of inferior wall (H)        Dose:  500 mg   Take 1 tablet (500 mg) by mouth 2 times daily   Quantity:  60 tablet   Refills:  11       rosuvastatin 20 MG tablet   Commonly known as:  CRESTOR   Used for:  ST elevation myocardial infarction (STEMI) involving other coronary artery of inferior wall (H)        Dose:  20 mg   Take 1 tablet (20 mg) by mouth daily    Quantity:  90 tablet   Refills:  3       ticagrelor 90 MG tablet   Commonly known as:  BRILINTA   Used for:  ST elevation myocardial infarction (STEMI) involving other coronary artery of inferior wall (H)        Dose:  90 mg   Take 1 tablet (90 mg) by mouth every 12 hours   Quantity:  180 tablet   Refills:  3         CONTINUE these medicines which have NOT CHANGED        Dose / Directions    VITAMIN D (CHOLECALCIFEROL) PO        Dose:  3000 Units   Take 3,000 Units by mouth daily   Refills:  0            Where to get your medicines      These medications were sent to [facility] [place], [place] - [address]  [address], [place] [address]     Phone:  [phone]     aspirin 81 MG EC tablet    carvedilol 3.125 MG tablet    lisinopril 5 MG tablet    nitroGLYcerin 0.4 MG sublingual tablet    ranolazine 500 MG 12 hr tablet    rosuvastatin 20 MG tablet    ticagrelor 90 MG tablet                Protect others around you: Learn how to safely use, store and throw away your medicines at www.disposemymeds.org.             Medication List: This is a list of all your medications and when to take them. Check marks below indicate your daily home schedule. Keep this list as a reference.      Medications           Morning Afternoon Evening Bedtime As Needed    aspirin 81 MG EC tablet   Take 1 tablet (81 mg) by mouth daily   Start taking on:  5/14/2018   Last time this was given:  81 mg on 5/13/2018  8:42 AM   Next Dose Due:  5/14 9 am                                   carvedilol 3.125 MG tablet   Commonly known as:  COREG   Take 1 tablet (3.125 mg) by mouth 2 times daily (with meals)   Last time this was given:  3.125 mg on 5/13/2018  8:42 AM   Next Dose Due:  5/13 6pm                                      lisinopril 5 MG tablet   Commonly known as:  PRINIVIL/ZESTRIL   Take 1 tablet (5 mg) by mouth daily   Start taking on:  5/14/2018   Last time this was given:  5 mg on 5/13/2018  8:42 AM   Next  Dose Due:  5/14 9 am                                   nitroGLYcerin 0.4 MG sublingual tablet   Commonly known as:  NITROSTAT   For chest pain place 1 tablet under the tongue every 5 minutes for 3 doses. If symptoms persist 5 minutes after 1st dose call 911.                                   ranolazine 500 MG 12 hr tablet   Commonly known as:  RANEXA   Take 1 tablet (500 mg) by mouth 2 times daily   Last time this was given:  500 mg on 5/13/2018  8:42 AM   Next Dose Due:  5/13 9pm                                      rosuvastatin 20 MG tablet   Commonly known as:  CRESTOR   Take 1 tablet (20 mg) by mouth daily   Last time this was given:  20 mg on 5/12/2018  8:28 PM   Next Dose Due:  5/13 9pm                                   ticagrelor 90 MG tablet   Commonly known as:  BRILINTA   Take 1 tablet (90 mg) by mouth every 12 hours   Last time this was given:  90 mg on 5/13/2018  8:42 AM   Next Dose Due:  5/13 9pm                                      VITAMIN D (CHOLECALCIFEROL) PO   Take 3,000 Units by mouth daily   Next Dose Due:  5/14 9am                                             More Information      Cardiac Rehabilitation  Living your best life  If you have had a heart attack, valve or bypass surgery, stent placement or other heart problem, cardiac rehabilitation can help you return safely and more quickly to your normal life.  Our team includes doctors, exercise specialists, dietitians, pharmacists, chaplains/psychologists and social workers. We will help you create your plan for a heart-healthy lifestyle. It may include:    Quitting smoking    Regular exercise    Losing weight    Eating a healthy diet    Other lifestyle changes to improve health.  Team members will work with you to help you reach your goals.  Three phases of rehab  There are three phases: in hospital, therapy after the hospital and an exercise program (Wellness and Exercise for Life).  In the hospital  We will work with you to make you stronger  and slowly increase your activity. You'll learn about risk factors for your heart, and we will monitor your heart to be sure you are ready to leave the hospital.  Exercise therapy  This phase starts soon after you leave the hospital. Your heart rhythm, blood pressure and heart rate are closely tracked in this exercise program. This will make sure you are safe while you are active.  The program is tailored to meet your personal goals. We will help you improve your heart and lung function, strengthen your muscles, and succeed in making lifestyle changes.  Exercise sessions will also help you return safely to your daily activities and work. You will have a chance to meet one-on-one with an exercise specialist to review the changes you are making and to measure your progress. You and your family may also attend classes on exercise, nutrition, medicines, blood pressure, artery disease and stress management.  WEL (Wellness and Exercise for Life)  This ongoing exercise program provides a fun, inspiring setting for you to exercise with others who have similar goals. There is a fee. Our specialists help you increase your exercise workloads safely. They can help you learn how to have an active lifestyle and gain confidence to exercise on your own. This program includes supervised aerobic activity and exercises to make you stronger and more limber.  Getting started  You will need a referral from your doctor. We will work with you and your doctor to design a program that is right for you. Most health insurance plans will cover rehab programs, but it is best to confirm coverage with your insurer.  Locations  To make your first appointment, call [phone] or [phone]. In [place] and [place], call [phone].  [facility]  [address], [address]  [place] [address]  [facility]  [address]  [place] [address]  [facility]  [address]  [place] [address]  [facility] - [address] [address]  [facility]  [address] [place] [address]  [facility]  [address] [place] [address]  Visit [email or web address] for more information.  For informational purposes only. Not to replace the advice of your health care provider.  Copyright   2015 [facility]. All rights reserved. Grid20/20 319725 - REV 02/16.            Discharge Instructions for Heart Attack  You have had a heart attack (acute myocardial infarction). A heart attack occurs when a vessel that sends blood to your heart suddenly becomes blocked. This causes your heart not to work as well as it should. Follow these guidelines for home care and lifestyle changes.  Home care    Take your medicines exactly as directed. Don t skip doses. Talk with your healthcare provider if your medicines aren't working for you. Together you can come up with another treatment plan.    Remember that recovery after a heart attack takes time. Plan to rest for at least 4 to 8 weeks while you recover. Then return to normal activity when your doctor says it s OK.    Ask your doctor about joining a heart rehabilitation program. This can help strengthen your heart and lungs and give you more energy and confidence.    Tell your doctor if you are feeling depressed. Feelings of sadness are common after a heart attack. But it is important to speak to someone or seek counseling if you are feeling overwhelmed by these feelings.    Call 911 right away if you have chest pain or pain that goes to your shoulder, neck, or back. Don't drive yourself to the  hospital.    Ask your family members to learn CPR. This is an important skill that can save lives when it's needed.    Learn to take your own blood pressure and pulse. Keep a record of your results. Ask your doctor when you should seek emergency medical attention. He or she will tell you which blood pressure reading is dangerous.  Lifestyle changes  Your heart attack might have been caused by cardiovascular disease. Your healthcare provider will work with you to make changes to your lifestyle. This will help the heart disease from getting worse. These changes will most likely be a combination of diet and exercise.  Diet  Your healthcare provider will tell you what changes you need to make to your diet. You may need to see a registered dietitian for help with these diet changes. These changes may include:    Cutting back on how much fat and cholesterol you eat    Cutting back on how much salt (sodium) you eat, especially if you have high blood pressure    Eating more fresh vegetables and fruits    Eating lean proteins such as fish, poultry, beans, and peas, and eating less red meat and processed meats    Using low-fat dairy products    Using vegetable and nut oils in limited amounts    Limiting how many sweets and processed foods such as chips, cookies, and baked goods you eat    Limiting how often you eat out. And when you do eat out, making better food choices.    Not eating fried or greasy foods, or foods high in saturated fat  Exercise  Your healthcare provider may tell you to get more exercise if you haven't been physically active. Depending on your case, your provider may recommend that you get moderate to vigorous physical activity for at least 40 minutes each day, and for at least 3 to 4 days each week. A few examples of moderate to vigorous activity include:    Walking at a brisk pace, about 3 to 4 miles per hour    Jogging or running    Swimming or water aerobics    GROUNDFLOOR  arts    Tennis    Riding a bicycle or stationary bike  Other changes  Your healthcare provider may also recommend that you:    Lose weight. If you are overweight or obese, your provider will work with you to lose extra pounds. Making diet changes and getting more exercise can help. A good goal is to lose your 10% of your body weight in one year.    Stop smoking. Sign up for a stop-smoking program to make it more likely for you to quit for good. You can join a stop-smoking support group. Or ask your doctor about nicotine replacement products.    Learn to manage stress. Stress management techniques to help you deal with stress in your home and work life. This will help you feel better emotionally and ease the strain on your heart.  Follow-up  Make a follow-up appointment as directed.     Call 911  Call 911 right away if you have:    Chest pain that goes to your neck, jaw, back, or shoulder    Shortness of breath  When to call your healthcare provider  Call your healthcare provider right away if you have:    Lightheadedness, dizziness, or fainting    Feeling of irregular heartbeat or fast pulse   Date Last Reviewed: 10/1/2016    8157-9234 [employer]. [address] [place] [address]. All rights reserved. This information is not intended as a substitute for professional medical care. Always follow your healthcare professional's instructions.                Ticagrelor Oral tablet  What is this medicine?  TICAGRELOR (AIME ka GREL or) helps to prevent blood clots. This medicine is used to prevent heart attack, stroke, or other vascular events in people who have had a recent heart attack or who have severe chest pain.  This medicine may be used for other purposes; ask your health care provider or pharmacist if you have questions.  What should I tell my health care provider before I take this medicine?  They need to know if you have any of these conditions:    bleeding disorder    bleeding in the  brain    liver disease    planned surgery    stomach or intestinal ulcers    stroke or transient ischemic attack    an unusual or allergic reaction to ticagrelor, other medicines, foods, dyes, or preservatives    pregnant or trying to get pregnant    breast-feeding  How should I use this medicine?  Take this medicine by mouth with a glass of water. Follow the directions on the prescription label. You can take it with or without food. If it upsets your stomach, take it with food. Take your medicine at regular intervals. Do not take it more often than directed. Do not stop taking except on your doctor's advice.  Talk to you pediatrician regarding the use of this medicine in children. Special care may be needed.  Overdosage: If you think you've taken too much of this medicine contact a poison control center or emergency room at once.  NOTE: This medicine is only for you. Do not share this medicine with others.  What if I miss a dose?  If you miss a dose, take it as soon as you can. If it is almost time for your next dose, take only that dose. Do not take double or extra doses.  What may interact with this medicine?    certain antibiotics like clarithromycin and telithromycin    certain medicines for fungal infections like itraconazole, ketoconazole, and voriconazole    certain medicines for HIV infection like atazanavir, indinavir, nelfinavir, ritonavir, and saquinavir    certain medicines for seizures like carbamazepine, phenobarbital, and phenytoin    certain medicines that treat or prevent blood clots like warfarin    dexamethasone    digoxin    lovastatin    nefazodone    rifampin    simvastatin  This list may not describe all possible interactions. Give your health care provider a list of all the medicines, herbs, non-prescription drugs, or dietary supplements you use. Also tell them if you smoke, drink alcohol, or use illegal drugs. Some items may interact with your medicine.  What should I watch for while using  this medicine?  Visit your doctor or health care professional for regular check ups. Do not stop taking you medicine unless your doctor tells you to.  Notify your doctor or health care professional and seek emergency treatment if you develop breathing problems; changes in vision; chest pain; severe, sudden headache; pain, swelling, warmth in the leg; trouble speaking; sudden numbness or weakness of the face, arm, or leg. These can be signs that your condition has gotten worse.  If you are going to have surgery or dental work, tell your doctor or health care professional that you are taking this medicine.  You should take aspirin every day with this medicine. Do not take more than 100 mg each day. Talk to your doctor if you have questions.  What side effects may I notice from receiving this medicine?  Side effects that you should report to your doctor or health care professional as soon as possible:    allergic reactions like skin rash, itching or hives, swelling of the face, lips, or tongue    breathing problems    fast or irregular heartbeat    feeling faint or light-headed, falls    signs and symptoms of bleeding such as bloody or black, tarry stools; red or dark-brown urine; spitting up blood or brown material that looks like coffee grounds; red spots on the skin; unusual bruising or bleeding from the eye, gums, or nose  Side effects that usually do not require medical attention (Report these to your doctor or health care professional if they continue or are bothersome.):    breast enlargement in both males and females    diarrhea    dizziness    headache    tiredness    upset stomach  This list may not describe all possible side effects. Call your doctor for medical advice about side effects. You may report side effects to FDA at 2-327-FDA-3980.  Where should I keep my medicine?  Keep out of the reach of children.  Store at room temperature of 59 to 86 degrees F (15 to 30 degrees C). Throw away any unused  medicine after the expiration date.  NOTE: This sheet is a summary. It may not cover all possible information. If you have questions about this medicine, talk to your doctor, pharmacist, or health care provider.  NOTE:This sheet is a summary. It may not cover all possible information. If you have questions about this medicine, talk to your doctor, pharmacist, or health care provider. Copyright  2016 [employer]                Patient Education    Carvedilol Oral capsule, extended-release    Carvedilol Oral tablet  Carvedilol Oral tablet  What is this medicine?  CARVEDILOL (TI ve dil ol) is a beta-blocker. Beta-blockers reduce the workload on the heart and help it to beat more regularly. This medicine is used to treat high blood pressure and heart failure.  This medicine may be used for other purposes; ask your health care provider or pharmacist if you have questions.  What should I tell my health care provider before I take this medicine?  They need to know if you have any of these conditions:    circulation problems    diabetes    history of heart attack or heart disease    liver disease    lung or breathing disease, like asthma or emphysema    pheochromocytoma    slow or irregular heartbeat    thyroid disease    an unusual or allergic reaction to carvedilol, other beta-blockers, medicines, foods, dyes, or preservatives    pregnant or trying to get pregnant    breast-feeding  How should I use this medicine?  Take this medicine by mouth with a glass of water. Follow the directions on the prescription label. It is best to take the tablets with food. Take your doses at regular intervals. Do not take your medicine more often than directed. Do not stop taking except on the advice of your doctor or health care professional.  Talk to your pediatrician regarding the use of this medicine in children. Special care may be needed.  Overdosage: If you think you have taken too much of this medicine contact a poison control  center or emergency room at once.  NOTE: This medicine is only for you. Do not share this medicine with others.  What if I miss a dose?  If you miss a dose, take it as soon as you can. If it is almost time for your next dose, take only that dose. Do not take double or extra doses.  What may interact with this medicine?  This medicine may interact with the following medications:    certain medicines for blood pressure, heart disease, irregular heart beat    certain medicines for depression, like fluoxetine or paroxetine    certain medicines for diabetes, like glipizide or glyburide    cimetidine    clonidine    cyclosporine    digoxin    MAOIs like Carbex, Eldepryl, Marplan, Nardil, and Parnate    reserpine    rifampin  This list may not describe all possible interactions. Give your health care provider a list of all the medicines, herbs, non-prescription drugs, or dietary supplements you use. Also tell them if you smoke, drink alcohol, or use illegal drugs. Some items may interact with your medicine.  What should I watch for while using this medicine?  Check your heart rate and blood pressure regularly while you are taking this medicine. Ask your doctor or health care professional what your heart rate and blood pressure should be, and when you should contact him or her. Do not stop taking this medicine suddenly. This could lead to serious heart-related effects.  Contact your doctor or health care professional if you have difficulty breathing while taking this drug.  Check your weight daily. Ask your doctor or health care professional when you should notify him/her of any weight gain.  You may get drowsy or dizzy. Do not drive, use machinery, or do anything that requires mental alertness until you know how this medicine affects you. To reduce the risk of dizzy or fainting spells, do not sit or stand up quickly. Alcohol can make you more drowsy, and increase flushing and rapid heartbeats. Avoid alcoholic drinks.  If  you have diabetes, check your blood sugar as directed. Tell your doctor if you have changes in your blood sugar while you are taking this medicine.  If you are going to have surgery, tell your doctor or health care professional that you are taking this medicine.  What side effects may I notice from receiving this medicine?  Side effects that you should report to your doctor or health care professional as soon as possible:    allergic reactions like skin rash, itching or hives, swelling of the face, lips, or tongue    breathing problems    dark urine    irregular heartbeat    swollen legs or ankles    vomiting    yellowing of the eyes or skin  Side effects that usually do not require medical attention (report to your doctor or health care professional if they continue or are bothersome):    change in sex drive or performance    diarrhea    dry eyes (especially if wearing contact lenses)    dry, itching skin    headache    nausea    unusually tired  This list may not describe all possible side effects. Call your doctor for medical advice about side effects. You may report side effects to FDA at 5-214-DID-4852.  Where should I keep my medicine?  Keep out of the reach of children.  Store at room temperature below 30 degrees C (86 degrees F). Protect from moisture. Keep container tightly closed. Throw away any unused medicine after the expiration date.  NOTE:This sheet is a summary. It may not cover all possible information. If you have questions about this medicine, talk to your doctor, pharmacist, or health care provider. Copyright  2016 [employer]                Lisinopril Oral tablet  What is this medicine?  LISINOPRIL (lyse IN oh pril) is an ACE inhibitor. This medicine is used to treat high blood pressure and heart failure. It is also used to protect the heart immediately after a heart attack.  This medicine may be used for other purposes; ask your health care provider or pharmacist if you have questions.  What  should I tell my health care provider before I take this medicine?  They need to know if you have any of these conditions:    diabetes    heart or blood vessel disease    immune system disease like lupus or scleroderma    kidney disease    low blood pressure    previous swelling of the tongue, face, or lips with difficulty breathing, difficulty swallowing, hoarseness, or tightening of the throat    an unusual or allergic reaction to lisinopril, other ACE inhibitors, insect venom, foods, dyes, or preservatives    pregnant or trying to get pregnant    breast-feeding  How should I use this medicine?  Take this medicine by mouth with a glass of water. Follow the directions on your prescription label. You may take this medicine with or without food. Take your medicine at regular intervals. Do not stop taking this medicine except on the advice of your doctor or health care professional.  Talk to your pediatrician regarding the use of this medicine in children. Special care may be needed. While this drug may be prescribed for children as young as 6 years of age for selected conditions, precautions do apply.  Overdosage: If you think you have taken too much of this medicine contact a poison control center or emergency room at once.  NOTE: This medicine is only for you. Do not share this medicine with others.  What if I miss a dose?  If you miss a dose, take it as soon as you can. If it is almost time for your next dose, take only that dose. Do not take double or extra doses.  What may interact with this medicine?    diuretics    lithium    NSAIDs, medicines for pain and inflammation, like ibuprofen or naproxen    over-the-counter herbal supplements like hawthorn    potassium salts or potassium supplements    salt substitutes  This list may not describe all possible interactions. Give your health care provider a list of all the medicines, herbs, non-prescription drugs, or dietary supplements you use. Also tell them if you  smoke, drink alcohol, or use illegal drugs. Some items may interact with your medicine.  What should I watch for while using this medicine?  Visit your doctor or health care professional for regular check ups. Check your blood pressure as directed. Ask your doctor what your blood pressure should be, and when you should contact him or her. Call your doctor or health care professional if you notice an irregular or fast heart beat.  Women should inform their doctor if they wish to become pregnant or think they might be pregnant. There is a potential for serious side effects to an unborn child. Talk to your health care professional or pharmacist for more information.  Check with your doctor or health care professional if you get an attack of severe diarrhea, nausea and vomiting, or if you sweat a lot. The loss of too much body fluid can make it dangerous for you to take this medicine.  You may get drowsy or dizzy. Do not drive, use machinery, or do anything that needs mental alertness until you know how this drug affects you. Do not stand or sit up quickly, especially if you are an older patient. This reduces the risk of dizzy or fainting spells. Alcohol can make you more drowsy and dizzy. Avoid alcoholic drinks.  Avoid salt substitutes unless you are told otherwise by your doctor or health care professional.  Do not treat yourself for coughs, colds, or pain while you are taking this medicine without asking your doctor or health care professional for advice. Some ingredients may increase your blood pressure.  What side effects may I notice from receiving this medicine?  Side effects that you should report to your doctor or health care professional as soon as possible:    abdominal pain with or without nausea or vomiting    allergic reactions like skin rash or hives, swelling of the hands, feet, face, lips, throat, or tongue    dark urine    difficulty breathing    dizzy, lightheaded or fainting spell    fever or sore  throat    irregular heart beat, chest pain    pain or difficulty passing urine    redness, blistering, peeling or loosening of the skin, including inside the mouth    unusually weak    yellowing of the eyes or skin  Side effects that usually do not require medical attention (report to your doctor or health care professional if they continue or are bothersome):    change in taste    cough    decreased sexual function or desire    headache    sun sensitivity    tiredness  This list may not describe all possible side effects. Call your doctor for medical advice about side effects. You may report side effects to FDA at 6-444-VRF-5545.  Where should I keep my medicine?  Keep out of the reach of children.  Store at room temperature between 15 and 30 degrees C (59 and 86 degrees F). Protect from moisture. Keep container tightly closed. Throw away any unused medicine after the expiration date.  NOTE:This sheet is a summary. It may not cover all possible information. If you have questions about this medicine, talk to your doctor, pharmacist, or health care provider. Copyright  2016 [employer] Standard              Taking Aspirin Every Day  The basics  Taking aspirin every day can lower your risk of heart attack and stroke. Ask your doctor about taking aspirin if you:    Are a man age 45 or older    Are a woman age 55 or older    Smoke    Have high blood pressure, high cholesterol or diabetes    Have a family history of heart disease    Have already had a heart attack or stroke  For most people, aspirin is safe. But it's not right for everyone. Talk to your doctor before you start taking aspirin every day.  The benefits  Aspirin can reduce your risk of heart attack or stroke. It can:    Improve the flow of blood to the heart and brain    Help keep your arteries open if you have had a stroke or angioplasty  If you have already had a heart attack or stroke, daily aspirin can lower your risk of having another one.  Take action!  Your  "doctor can help you decide if aspirin is the right choice for you. Talk to your doctor about:    Your risk of heart attack    What kind of aspirin to take    How much to take    How often to take it  Be sure to tell your doctor about all the other medicines that you take, including vitamins.   For informational purposes only. Not to replace the advice of your health care provider. From \"Talk with Your Doctor about Taking Aspirin Every Day,\" by the U.[place]. Dept. of Health and Human Services (www.healthfinder.gov). [place] 633861 - Rev 03/16.            Ranolazine Oral tablet, extended-release  What is this medicine?  RANOLAZINE (ra NILES la zeen) is a heart medicine. It is used to treat chronic chest pain (angina). This medicine must be taken regularly. It will not relieve an acute episode of chest pain.  This medicine may be used for other purposes; ask your health care provider or pharmacist if you have questions.  What should I tell my health care provider before I take this medicine?  They need to know if you have any of these conditions:    heart disease    irregular heartbeat    kidney disease    liver disease    low levels of potassium or magnesium in the blood    an unusual or allergic reaction to ranolazine, other medicines, foods, dyes, or preservatives    pregnant or trying to get pregnant    breast-feeding  How should I use this medicine?  Take this medicine by mouth with a glass of water. Follow the directions on the prescription label. Do not cut, crush, or chew this medicine. Take with or without food. Do not take this medication with grapefruit juice. Take your doses at regular intervals. Do not take your medicine more often then directed.  Talk to your pediatrician regarding the use of this medicine in children. Special care may be needed.  Overdosage: If you think you have taken too much of this medicine contact a poison control center or emergency room at once.  NOTE: This medicine is only for you. " Do not share this medicine with others.  What if I miss a dose?  If you miss a dose, take it as soon as you can. If it is almost time for your next dose, take only that dose. Do not take double or extra doses.  What may interact with this medicine?  Do not take this medicine with any of the following medications:    antivirals for HIV or AIDS    cerivastatin    certain antibiotics like chloramphenicol, clarithromycin, dalfopristin; quinupristin, isoniazid, rifabutin, rifampin, rifapentine    certain medicines used for cancer like imatinib, nilotinib    certain medicines for fungal infections like fluconazole, itraconazole, ketoconazole, posaconazole, voriconazole    certain medicines for irregular heart beat like dofetilide, dronedarone    certain medicines for seizures like carbamazepine, fosphenytoin, oxcarbazepine, phenobarbital, phenytoin    cisapride    conivaptan    cyclosporine    grapefruit or grapefruit juice    nefazodone    pimozide    quinacrine    Nora's wort    thioridazine    ziprasidone  This medicine may also interact with the following medications:    alfuzosin    certain medicines for depression, anxiety, or psychotic disturbances like bupropion, citalopram, fluoxetine, fluphenazine, paroxetine, perphenazine, risperidone, sertraline, trifluoperazine    certain medicines for cholesterol like atorvastatin, lovastatin, simvastatin    certain medicines for stomach problems like octreotide, palonosetron, prochlorperazine    eplerenone    ergot alkaloids like dihydroergotamine, ergonovine, ergotamine, methylergonovine    metformin    nicardipine    other medicines that prolong the QT interval (cause an abnormal heart rhythm)    sirolimus    tacrolimus  This list may not describe all possible interactions. Give your health care provider a list of all the medicines, herbs, non-prescription drugs, or dietary supplements you use. Also tell them if you smoke, drink alcohol, or use illegal drugs. Some  items may interact with your medicine.  What should I watch for while using this medicine?  Visit your doctor for regular check ups. Tell your doctor or healthcare professional if your symptoms do not start to get better or if they get worse. This medicine will not relieve an acute attack of angina or chest pain.  This medicine can change your heart rhythm. Your health care provider may check your heart rhythm by ordering an electrocardiogram (ECG) while you are taking this medicine.  You may get drowsy or dizzy. Do not drive, use machinery, or do anything that needs mental alertness until you know how this medicine affects you. Do not stand or sit up quickly, especially if you are an older patient. This reduces the risk of dizzy or fainting spells. Alcohol may interfere with the effect of this medicine. Avoid alcoholic drinks.  If you are scheduled for any medical or dental procedure, tell your healthcare provider that you are taking this medicine. This medicine can interact with other medicines used during surgery.  What side effects may I notice from receiving this medicine?  Side effects that you should report to your doctor or health care professional as soon as possible:    allergic reactions like skin rash, itching or hives, swelling of the face, lips, or tongue    breathing problems    changes in vision    fast, irregular or pounding heartbeat    feeling faint or lightheaded, falls    low or high blood pressure    numbness or tingling feelings    ringing in the ears    tremor or shakiness    slow heartbeat (fewer than 50 beats per minute)    swelling of the legs or feet  Side effects that usually do not require medical attention (report to your prescriber or health care professional if they continue or are bothersome):    constipation    drowsy    dry mouth    headache    nausea or vomiting    stomach upset  This list may not describe all possible side effects. Call your doctor for medical advice about side  effects. You may report side effects to FDA at 3-589-FDA-9845.  Where should I keep my medicine?  Keep out of the reach of children.  Store at room temperature between 15 and 30 degrees C (59 and 86 degrees F). Throw away any unused medicine after the expiration date.  NOTE:This sheet is a summary. It may not cover all possible information. If you have questions about this medicine, talk to your doctor, pharmacist, or health care provider. Copyright  2016 [employer]                What Is Angina?  Angina is a warning that your heart muscle is not getting enough oxygen-rich blood and is at risk for damage. Medicines, certain medical procedures, and lifestyle changes can help control angina. Talk with your healthcare provider about how to prevent angina and what to do if you get it.    How does angina feel?  Angina is often described as chest pain, but this can be misleading. Angina is not always painful, and it isn t always felt in the chest. Angina might feel like:    Discomfort, aching, tightness, or pressure that comes and goes. You may feel this in your chest, back, abdomen, arm, shoulder, neck, or jaw.    Tiredness that gets worse or you have more tiredness than usual for no clear reason    Shortness of breath while doing something that used to be easy    Heartburn, indigestion, nausea, or sweating  Call 911 right away if any of your symptoms lasts for more than a few minutes. Or if they go away and come back. Or if they happen at rest and don't go away after taking nitroglycerin. Or if they continue to get worse. You could be having a heart attack (acute myocardial infarction).  When does angina happen?    Angina usually happens during activity. It can also occur when you re upset or after a large meal. Sometimes angina can happen when the weather is too hot or too cold. All of these things can put more stress on your body and your heart.    You may have unstable angina if angina starts occurring more often,  lasts longer, happens even when you are resting, or causes more discomfort. It s a sign that your heart problem may be getting worse. You need to call your healthcare provider right away.  Date Last Reviewed: 10/1/2016    5248-3692 [employer]. [address] [place] [address]. All rights reserved. This information is not intended as a substitute for professional medical care. Always follow your healthcare professional's instructions.                Discharge Instructions for Angina  You have been diagnosed with a type of chest pain called angina. Angina occurs when not enough oxygen reaches the heart muscle. It is most often felt under your breastbone, in your left shoulder, or down your left arm. The pain may even spread to your jaw or back. Exercise, increased activity, emotional upset, or stress can trigger this pain. With proper treatment and lifestyle changes to reduce risk factors, most people with angina are able to maintain a full and active life.  Managing risk factors  Your healthcare provider will work with you to make lifestyle changes as needed. This can help prevent worsening of coronary artery disease, which is likely the cause of your angina.  Coronary artery disease is a narrowing of the blood vessels that supply oxygen and nutrients to the heart muscle. The blood vessels can also spasm and reduce the oxygen reaching the heart muscle from the narrowing inside of the artery. Managing your risk factors may prevent both of these causes of narrowing of your arteries.  Diet  Your healthcare provider will give you information on dietary changes that you may need to make, based on your situation. Your provider may recommend that you see a registered dietitian for help with diet changes. Try these changes to start:      Eat less fat and cholesterol    Eat less sodium (salt), especially if you have high blood pressure     Eat more fresh vegetables and fruits     Eat lean proteins,  such as fish, poultry, and legumes (beans and peas), and eat less red meat and processed meats    Use low-fat dairy products     Use vegetable and nut oils in limited amounts     Limit sweets and processed foods such as chips, cookies, and baked goods    Limit sodas and high calorie drinks    Limit greasy and fried foods, or those high in saturated fat    Limit alcohol intake  Physical activity  Your healthcare provider may recommend that you increase your physical activity if you have not been as active as possible. This may include moderate to vigorous intensity physical activity for at least 30 to 60 minutes each day for at least 5 to 7 days per week. A few examples of moderate to vigorous intensity physical activity include:     Walking at a brisk pace, about 3 to 4 miles per hour    Jogging or running    Swimming or water aerobics    Hiking    Dancing    Martial arts    Tennis    Riding a bike  Don't start or increase your activity level without first seeing your healthcare provider.  Weight management  If you are overweight, your healthcare provider will work with you to lose weight and lower your BMI (body mass index) to a normal or near-normal level. Making diet changes and increasing physical activity can help. A healthy and reasonable goal for weight loss is to lose 10% of your current weight per year.  Smoking  If you smoke, break the smoking habit. Enroll in a stop-smoking program to improve your chances of success. You can also join a support group. Talk to your healthcare provider about nicotine replacement products or medicines to help you quit.  Stress  Learn ways to manage stress to help you deal with stress in your home and work life. Your ability to prioritize your health depends on your mental health and focus. Feeling supported in the rest of your life is key to achieving success with your health.  Managing medicines    Keep a record of your episodes of chest pain. Take these with you when you  see your healthcare provider.    Take your medicines exactly as directed. Don t skip doses. If you miss a dose, call your healthcare provider right away.    If you have unwanted side effects from your medicine, tell your healthcare provider right away.  Taking nitroglycerin    Keep your nitroglycerin with you at all times.    If you re on nitroglycerin, don t take medicines used to treat erectile dysfunction (such as sildenafil) at all. These can react with nitroglycerin and cause your blood pressure to drop to a dangerous or even life-threatening level.    If you use nitroglycerin to prevent angina attacks, follow your healthcare provider s instructions for your kind of nitroglycerin (pill, spray, or skin patch).    If you use nitroglycerin to stop an angina attack, follow these steps:  ? Sit down, because you may become dizzy.  ? Put 1 tablet under your tongue, or between your lip and gum, or between your cheek and gum. Let the tablet dissolve completely. Don't chew or swallow the tablet.  ? If you use a spray, then spray once on or under your tongue. Don't inhale. Close your mouth. Wait a few seconds before you swallow and don't rinse your mouth for 5 to 10 minutes.  ? After taking 1 tablet or spraying once, continue sitting for 5 minutes.  ? If the angina goes away completely, rest awhile and follow your provider's orders about returning to your normal routine.  ? If the chest pain or pressure continues, [id number] right away. Don't delay. You may be having a heart attack (acute myocardial infarction, or AMI)!  ? You may be told by your doctor to [id number] taking 2 or 3 tables or sprays of nitroglycerin (spaced 5 minutes apart) and the chest pain or pressure is still present 5 minutes after the last dose. Don't take more than 3 tablets, or spray more than 3 times, within 15 minutes.   When to call your healthcare provider  Call your healthcare provider right away if you have any of these:    Severe  headache    Severe dizziness, or fainting    Nausea or vomiting    Fast heartbeat (higher than 100 beats per minute)    Swollen ankles    Weakness    Angina attacks that last longer, occur more often, or are more severe than in the past    Angina that occurs at rest, wakes you up out of sleep, or does not resolve   Date Last Reviewed: 6/1/2016 2000-2017 [employer]. [address] [place] [address]. All rights reserved. This information is not intended as a substitute for professional medical care. Always follow your healthcare professional's instructions.

## 2018-05-10 NOTE — PROVIDER NOTIFICATION
MD Notification    Notified Person: MD    Notified Person Name: Dr. Shetty    Notification Date/Time:5-10-18 1202    Notification Interaction: Talked with Dr Shetty.  Purpose of Notification: Trop over 68      Orders Received: None    Comments:

## 2018-05-10 NOTE — PLAN OF CARE
Problem: Patient Care Overview  Goal: Plan of Care/Patient Progress Review  OT/CR: Nurse reporting at least 7 hours more of bedrest, just out of cath lab. Cancel CR eval today.

## 2018-05-10 NOTE — CONSULTS
Consult Date:  05/10/2018      EMERGENCY CARDIAC CONSULTATION/VFIB CARDIAC ARREST       CRITICAL CARE TIME:  90 minutes.        DATE OF CONSULTATION:  05/10/2018       HISTORY:  This is a 53-year-old gentleman who had a past history of hyperlipidemia.  He was on Zocor in the past that was discontinued.  Interestingly, he was actually at his doctor's office recently and was noted to have somewhat high cholesterol.  Yesterday, he received a letter from the doctor stating the cholesterol was a little high and he should start an exercise program.  Today he decided for the first time to start a high intensity aerobic workout in his basement and then had the acute onset of chest pain.  He presented to the Emergency Room where he had ST-segment elevation infarction.  I was notified.  We brought to the Cath Lab.  In the hallway to the Cath Lab he had VFib cardiac arrest.  He was shocked one time back to sinus rhythm; however, he continued to have spindles of torsades V-tach for which I gave him IV beta-blocker initially and we did rapid sequence angiogram and angioplasty.  The reader is referred to the catheterization report for that.      PAST MEDICAL HISTORY:   1.  Hyperlipidemia as above.   2.  Nephrolithiasis thought due to hypercalcemia.  He had 2 of his 4 parathyroid glands removed for that, although he has had a kidney stone since that time.      MEDICATIONS:  None.      ALLERGIES:  NONE.      SOCIAL HISTORY:  He is .  He works as a .  He has never been a smoker.  His alcohol intake is rare at most.  Caffeine is a couple cups of coffee per day.      FAMILY HISTORY:  Father is alive at age 90 having had a heart attack at 80.  Mother has thyroid disease.  Siblings are alive and well.      REVIEW OF SYSTEMS:  Completely negative except as the episode today.  No previous cardiac symptoms whatsoever. No lung, neurologic, , GI, or orthopedic.  Endocrine for only hyperparathyroidism.  The remainder of  review of systems negative.      PHYSICAL EXAMINATION:     GENERAL:  The patient was somnolent because he was immediately post-arrest.     VITAL SIGNS:  On presentation blood pressure was 143/85 and heart rate in the 70s.   SKIN:  No petechia, rash or xanthoma.   HEENT:  Nonicteric.   NECK:  Supple without thyromegaly or adenopathy.   CHEST:  Done on the table quickly, clear.   HEART:  Almost inaudible.  Carotid pulses 2+, femoral pulse, 2+.  Neck veins are hard to assess, flat.   ABDOMEN:  Benign.  There is no organomegaly.   EXTREMITIES:  No cyanosis, clubbing or edema.   NEUROLOGIC:  He was very somnolent because again this was post-arrest.  He is moving all fours.      LABORATORY:  EKG shows massive ST elevation in inferior leads with reciprocal lateral change.  The reader is referred to the lab.     Rapid sequence angioplasty and angiogram were then performed.  The reader is referred to that report.         SUKHI MEDELLIN MD             D: 05/10/2018   T: 05/10/2018   MT: JOHN      Name:     BRINE KUHN   MRN:      [id number]        Account:       [id number]   :      1965           Consult Date:  05/10/2018      Document: [id number]       cc: Josué Vaughn MD

## 2018-05-10 NOTE — ED NOTES
Pt's eyes rolled to back pf head, pt became unresponsive and monitor showed vfib. Pt shocked x1 w/ 120j and compressions started. Pt became responsive and monitor showed organized rhythm.

## 2018-05-10 NOTE — ED PROVIDER NOTES
"  History     Chief Complaint:  Chest Pain and Shortness of Breath     HPI   Braulio Bunch is a 53 year old male who presents via EMS for evaluation of chest pain and shortness of breath. This morning around 0730, the patient was exercising vigorously, doing \"the insanity workout,\" for the first time in a while when he started to develop severe chest pain and shortness of breath, prompting him to call EMS to bring him into the ED for evaluation. On EMS' examination, the patient had a 12 lead indicating a STEMI, a blood pressure of 123/70, and a heart rate of 70. He was provided 324 mg of Aspirin and a dose of Nitroglycerin prior to arrival in the ED. Currently in the ED, the patient rates his pain at a severity of 9/10. Notably, the patient had a normal EKG during a routine physical about a month ago.  No previous cardiac history.    Cardiac Risk Factors:  CAD:    Negative   Hypertension:   Negative   Hyperlipidemia:  Negative   Diabetes:   Negative   Tobacco use:   Negative   Gender:   Male  Age:    53  Familial Hx of CAD:  Positive      Allergies:  Augmentin     Medications:    The patient is not currently taking any prescribed medications.     Past Medical History:    Kidney stone     Past Surgical History:    Parathyroidectomy     Family History:    Hyperlipidemia - Mother and brother   Gout - Father   Heart disease - Father   Neuropathy - Father and mother   Skin cancer - Father and mother   Allergies - Son   Asthma - Son     Social History:  Tobacco use:    Negative   Marital status:       Accompanied to ED by:  EMS and wife     Review of Systems   Respiratory: Positive for shortness of breath.    Cardiovascular: Positive for chest pain.   All other systems reviewed and are negative.    Physical Exam   First Vitals:  BP: 131/85  Pulse: 64  Heart Rate: 64  Temp: 98.9  F (37.2  C)  Resp: 18  Height: 185.4 cm (6' 1\")  Weight: 94.3 kg (207 lb 14.3 oz)  SpO2: 100 %      Physical Exam  General: Alert and " cooperative with exam. Patient in moderate distress. Normal mentation.  Head:  Scalp is NC/AT  Eyes:  No scleral icterus, PERRL  ENT:  The external nose and ears are normal. The oropharynx is normal and without erythema; mucus membranes are moist. Uvula midline, no evidence of deep space infection.  Neck:  Normal range of motion without rigidity.  CV:  Regular rate and rhythm    No pathologic murmur   Resp:  Breath sounds are clear bilaterally    Non-labored, no retractions or accessory muscle use  GI:  Abdomen is soft, no distension, no tenderness. No peritoneal signs. Overweight.   MS:  No lower extremity edema   Skin:  Warm and dry, No rash or lesions noted. Pale complexion.   Neuro: Oriented x 3. No gross motor deficits.     Emergency Department Course   ECG (7:59:14):  Indication: Screening for cardiovascular disease.   Rate 72 bpm. AZ interval 146 ms. QRS duration 104 ms. QT/QTc 412/451 ms. P-R-T axes 72 79 82.   Interpretation: Sinus rhythm with premature supraventricular complexes, Posterior infarct possibly acute, Inferolateral injury pattern, ACUTE MI / STEMI, Consider right ventricular involvement in acute inferior infarct, Abnormal ECG  Agree with computer interpretation. Yes   Interpreted at 0800 by Dr. Raymundo.      Imaging:  Radiographic findings were communicated with the patient and family who voiced understanding of the findings.    Chest XR Portable:  IMPRESSION: Cardiac silhouette and pulmonary vasculature are within normal limits. No focal airspace disease, pleural effusion or pneumothorax.  Per radiology.     Laboratory:  CBC: WNL (WBC 5.9, HGB 15.1, )  BMP: Potassium 3.2 low, Glucose 143 high, o/w WNL (Creatinine 1.13)  Troponin I 0800: <0.015       Interventions:  0807 Morphine 4 mg IV   0810 Heparin 6,000 units IV     Emergency Department Course:  Patient was brought to the ED via EMS.     Nursing notes and vitals reviewed.  0754: I performed an exam of the patient as documented  above.     0758: I spoke with Dr. Carter of the interventional cardiology service regarding patient's presentation, findings, and plan of care.     0812: As the patient was being transitioned to the portable monitor he went into V fib arrest and was shocked once at 120 joules with restoration of sinus rhythm. During v fib arrest the patient did lose consciousness but regained normal mentation. The patient had <10 chest compressions.      0814: The patient was transferred to the cath lab for PCI.     Impression & Plan      CMS Diagnoses: The patient has a STEMI     The patient was evaluated at 0754   Patient was transferred  to the cath lab which was activated due to ECG changes.   ASA given by medics    Medical Decision Making:  Braulio Bunch is a 53 year old male who presents by EMS as a code STEMI. The patient's medical history and records were reviewed. Immediately following EMS call, the cath lab was activated and the patient arrived in the ED a short time later. He was seen in a stabilization room. He had received 324 mg Aspirin and one Nitro spray prior to ED arrival. The patient was provided IV fluids and Morphine for pain control in the ED. EKG demonstrates evidence of inferior STEMI with reciprocal changes. Case discussed with Dr. Carter. Portable chest X-ray demonstrates no evidence of aortic widening or other significant pathology. The patient was provided a Heparin bolus. As the patient was being transitioned to the portable monitoring he went into V fib arrest and was shocked once at 120 joules with restoration of sinus rhythm. During v fib arrest the patient did lose consciousness but regained normal mentation. The patient had <10 chest compressions. He was taken emergently to the cath lab for PCI. Presentation consistent with STEMI.     Critical Care time:  was 32 minutes for this patient excluding procedures.    Diagnosis:    ICD-10-CM    1. ST elevation myocardial infarction (STEMI) involving  other coronary artery of inferior wall (H) I21.19    2. Cardiac arrest with ventricular fibrillation (H) I46.9     I49.01        Disposition:  Send to Cath Lab.       I, Stanislaw Acuna, am serving as a scribe at 7:54 AM on 5/10/2018 to document services personally performed by Carson Raymundo DO based on my observations and the provider's statements to me.     EMERGENCY DEPARTMENT       Carson Raymundo DO  05/10/18 0925

## 2018-05-10 NOTE — PLAN OF CARE
Problem: Cardiac: ACS (Acute Coronary Syndrome) (Adult)  Goal: Signs and Symptoms of Listed Potential Problems Will be Absent, Minimized or Managed (Cardiac: ACS)  Signs and symptoms of listed potential problems will be absent, minimized or managed by discharge/transition of care (reference Cardiac: ACS (Acute Coronary Syndrome) (Adult) CPG).  Outcome: Improving  Pt. A&O. HTN, improved with PO lisinopril and coreg with systolic in the 130s. Tele NSR. Around 12:10 pm pt started having runs of Vtach unsustained and unsymptomatic. Longest noted was 8 beats but frequent runs, followed by runs of PVCs. Pt. Reported continued chest pain with slight increase from a 3 to a 4.5 at this time. EKG done and Cardio MD updated. . Potassium replaced. Runs of PVCs stopped with just occasional and x1 run of v tach at 18:16 for 5 beats. Recheck K at 20:30. Pt. Has continued c/o constant chest pressure mid sternal, did not increase with palpation. Norco provides no improvement. Unable to sleep due to chest pressure.  Cardio T.O. To start nitro gtt for chest pain. Pt. Has been sleeping since start of gtt. Pt. Arrived with femoral stop above right groin incision site due to hematoma, remained stable during application. Sheath removed at 14:35, tolerated procedure without side effect. CMS intact. Right groin dry and intact with bruising and resolving hematoma (softer). Area marked. Bedrest until 20:30. Education material given and reviewed with pt. And wife: CAD binder, CAD medication, and ID Stent card.

## 2018-05-10 NOTE — ED NOTES
Bed: ST03  Expected date:   Expected time:   Means of arrival:   Comments:  vero - 53M STEMI eta 6114

## 2018-05-10 NOTE — PROVIDER NOTIFICATION
MD Notification    Notified Person: MD    Notified Person Name: Sena    Notification Date/Time: 5/10 15:17    Notification Interaction:     Purpose of Notification: Trop 103. Do you want to continue to trend trop until peak?     Orders Received:    Comments:

## 2018-05-11 ENCOUNTER — APPOINTMENT (OUTPATIENT)
Dept: OCCUPATIONAL THERAPY | Facility: CLINIC | Age: 53
DRG: 246 | End: 2018-05-11
Attending: INTERNAL MEDICINE
Payer: COMMERCIAL

## 2018-05-11 ENCOUNTER — APPOINTMENT (OUTPATIENT)
Dept: CARDIOLOGY | Facility: CLINIC | Age: 53
DRG: 246 | End: 2018-05-11
Attending: INTERNAL MEDICINE
Payer: COMMERCIAL

## 2018-05-11 LAB
ANION GAP SERPL CALCULATED.3IONS-SCNC: 6 MMOL/L (ref 3–14)
BUN SERPL-MCNC: 9 MG/DL (ref 7–30)
CALCIUM SERPL-MCNC: 8 MG/DL (ref 8.5–10.1)
CHLORIDE SERPL-SCNC: 107 MMOL/L (ref 94–109)
CO2 SERPL-SCNC: 26 MMOL/L (ref 20–32)
CREAT SERPL-MCNC: 0.81 MG/DL (ref 0.66–1.25)
ERYTHROCYTE [DISTWIDTH] IN BLOOD BY AUTOMATED COUNT: 12.5 % (ref 10–15)
GFR SERPL CREATININE-BSD FRML MDRD: >90 ML/MIN/1.7M2
GLUCOSE BLDC GLUCOMTR-MCNC: 135 MG/DL (ref 70–99)
GLUCOSE SERPL-MCNC: 112 MG/DL (ref 70–99)
HCT VFR BLD AUTO: 39.6 % (ref 40–53)
HGB BLD-MCNC: 13.7 G/DL (ref 13.3–17.7)
MAGNESIUM SERPL-MCNC: 2 MG/DL (ref 1.6–2.3)
MCH RBC QN AUTO: 29.9 PG (ref 26.5–33)
MCHC RBC AUTO-ENTMCNC: 34.6 G/DL (ref 31.5–36.5)
MCV RBC AUTO: 87 FL (ref 78–100)
PLATELET # BLD AUTO: 270 10E9/L (ref 150–450)
POTASSIUM SERPL-SCNC: 4 MMOL/L (ref 3.4–5.3)
RBC # BLD AUTO: 4.58 10E12/L (ref 4.4–5.9)
SODIUM SERPL-SCNC: 139 MMOL/L (ref 133–144)
WBC # BLD AUTO: 10 10E9/L (ref 4–11)

## 2018-05-11 PROCEDURE — 85027 COMPLETE CBC AUTOMATED: CPT | Performed by: INTERNAL MEDICINE

## 2018-05-11 PROCEDURE — 25000128 H RX IP 250 OP 636: Performed by: INTERNAL MEDICINE

## 2018-05-11 PROCEDURE — 93010 ELECTROCARDIOGRAM REPORT: CPT | Mod: 77 | Performed by: INTERNAL MEDICINE

## 2018-05-11 PROCEDURE — 25000132 ZZH RX MED GY IP 250 OP 250 PS 637: Performed by: NURSE PRACTITIONER

## 2018-05-11 PROCEDURE — 97535 SELF CARE MNGMENT TRAINING: CPT | Mod: GO | Performed by: OCCUPATIONAL THERAPIST

## 2018-05-11 PROCEDURE — 93010 ELECTROCARDIOGRAM REPORT: CPT | Mod: 76 | Performed by: INTERNAL MEDICINE

## 2018-05-11 PROCEDURE — 36415 COLL VENOUS BLD VENIPUNCTURE: CPT | Performed by: INTERNAL MEDICINE

## 2018-05-11 PROCEDURE — 97110 THERAPEUTIC EXERCISES: CPT | Mod: GO | Performed by: OCCUPATIONAL THERAPIST

## 2018-05-11 PROCEDURE — 00000146 ZZHCL STATISTIC GLUCOSE BY METER IP

## 2018-05-11 PROCEDURE — 40000275 ZZH STATISTIC RCP TIME EA 10 MIN

## 2018-05-11 PROCEDURE — 21000000 ZZH R&B IMCU HEART CARE

## 2018-05-11 PROCEDURE — 83735 ASSAY OF MAGNESIUM: CPT | Performed by: INTERNAL MEDICINE

## 2018-05-11 PROCEDURE — 25000132 ZZH RX MED GY IP 250 OP 250 PS 637: Performed by: INTERNAL MEDICINE

## 2018-05-11 PROCEDURE — 97165 OT EVAL LOW COMPLEX 30 MIN: CPT | Mod: GO | Performed by: OCCUPATIONAL THERAPIST

## 2018-05-11 PROCEDURE — 80048 BASIC METABOLIC PNL TOTAL CA: CPT | Performed by: INTERNAL MEDICINE

## 2018-05-11 PROCEDURE — 99232 SBSQ HOSP IP/OBS MODERATE 35: CPT | Performed by: NURSE PRACTITIONER

## 2018-05-11 PROCEDURE — 25000128 H RX IP 250 OP 636: Performed by: NURSE PRACTITIONER

## 2018-05-11 PROCEDURE — 93306 TTE W/DOPPLER COMPLETE: CPT | Mod: 26 | Performed by: INTERNAL MEDICINE

## 2018-05-11 PROCEDURE — 93306 TTE W/DOPPLER COMPLETE: CPT

## 2018-05-11 PROCEDURE — 40000133 ZZH STATISTIC OT WARD VISIT: Performed by: OCCUPATIONAL THERAPIST

## 2018-05-11 PROCEDURE — 93005 ELECTROCARDIOGRAM TRACING: CPT

## 2018-05-11 PROCEDURE — 99233 SBSQ HOSP IP/OBS HIGH 50: CPT | Mod: 25 | Performed by: INTERNAL MEDICINE

## 2018-05-11 RX ORDER — COLCHICINE 0.6 MG/1
0.6 TABLET ORAL 2 TIMES DAILY
Status: DISCONTINUED | OUTPATIENT
Start: 2018-05-11 | End: 2018-05-13

## 2018-05-11 RX ORDER — SODIUM CHLORIDE 9 MG/ML
INJECTION, SOLUTION INTRAVENOUS CONTINUOUS
Status: DISCONTINUED | OUTPATIENT
Start: 2018-05-11 | End: 2018-05-12 | Stop reason: CLARIF

## 2018-05-11 RX ORDER — RANOLAZINE 500 MG/1
500 TABLET, EXTENDED RELEASE ORAL 2 TIMES DAILY
Status: DISCONTINUED | OUTPATIENT
Start: 2018-05-11 | End: 2018-05-13 | Stop reason: HOSPADM

## 2018-05-11 RX ADMIN — SODIUM CHLORIDE: 9 INJECTION, SOLUTION INTRAVENOUS at 17:52

## 2018-05-11 RX ADMIN — ROSUVASTATIN CALCIUM 20 MG: 20 TABLET, FILM COATED ORAL at 20:57

## 2018-05-11 RX ADMIN — SODIUM CHLORIDE 500 ML: 9 INJECTION, SOLUTION INTRAVENOUS at 08:06

## 2018-05-11 RX ADMIN — ASPIRIN 81 MG: 81 TABLET, COATED ORAL at 10:29

## 2018-05-11 RX ADMIN — CARVEDILOL 6.25 MG: 6.25 TABLET, FILM COATED ORAL at 20:57

## 2018-05-11 RX ADMIN — TICAGRELOR 90 MG: 90 TABLET ORAL at 20:57

## 2018-05-11 RX ADMIN — COLCHICINE 0.6 MG: 0.6 TABLET, FILM COATED ORAL at 17:48

## 2018-05-11 RX ADMIN — RANOLAZINE 500 MG: 500 TABLET, FILM COATED, EXTENDED RELEASE ORAL at 20:57

## 2018-05-11 RX ADMIN — MORPHINE SULFATE 4 MG: 4 INJECTION, SOLUTION INTRAMUSCULAR; INTRAVENOUS at 03:19

## 2018-05-11 RX ADMIN — ACETAMINOPHEN 650 MG: 325 TABLET ORAL at 00:47

## 2018-05-11 RX ADMIN — TICAGRELOR 90 MG: 90 TABLET ORAL at 10:29

## 2018-05-11 RX ADMIN — RANOLAZINE 500 MG: 500 TABLET, FILM COATED, EXTENDED RELEASE ORAL at 10:29

## 2018-05-11 RX ADMIN — POTASSIUM CHLORIDE 20 MEQ: 1500 TABLET, EXTENDED RELEASE ORAL at 12:20

## 2018-05-11 ASSESSMENT — PAIN DESCRIPTION - DESCRIPTORS
DESCRIPTORS: PRESSURE
DESCRIPTORS: ACHING
DESCRIPTORS: SHARP
DESCRIPTORS: PRESSURE

## 2018-05-11 ASSESSMENT — ACTIVITIES OF DAILY LIVING (ADL): PREVIOUS_RESPONSIBILITIES: WORK;DRIVING;FINANCES;MEDICATION MANAGEMENT;YARDWORK;SHOPPING;LAUNDRY;HOUSEKEEPING;MEAL PREP

## 2018-05-11 NOTE — PROVIDER NOTIFICATION
MD Notification    Notified Person: MD    Notified Person Name: Sena     Notification Date/Time:    Notification Interaction: Telephone     Purpose of Notification: Sudden sharp/stabbing chest pain 6/10 worsens with deep breath. O2 and vitals stable.     Orders Received: start Colchicine.      Comments:

## 2018-05-11 NOTE — PROVIDER NOTIFICATION
On call Cardiologist updated on pt's ongoing CP.  CP has improved with Morphine.  Has not gotten worse.  VSS.  No new orders obtained, but MD would like the day shift RN to update rounding Cardiologist this am sooner rather than later to determine the plan.

## 2018-05-11 NOTE — PROGRESS NOTES
Patient with some sharp, pleuritic chest discomfort.  Possibly pericarditis from the infarct.  Will try colchicine.  If hypoxemia or evidence of desaturation, would do chest x-ray or PE protocol CT.

## 2018-05-11 NOTE — PROVIDER NOTIFICATION
Call placed to on call cardiologist & updated that pt is still having CP with Nitro gtt.  New orders to increase Coreg, new order for IV Morphine, DC Hydrocodone, & to given an additional Coreg 3.125mg.

## 2018-05-11 NOTE — PROGRESS NOTES
"Care Coordination:    Placed call to discharge Pharmacy for cost of Brilinta to patient, Kristine states,\" He will pay $24.99 a month, 1st month free and eligible for discount for additional months.\"    Ann Ruiz RN BSN  [facility] hospital Care Coordinator  Phone [phone]    "

## 2018-05-11 NOTE — CODE/RAPID RESPONSE
Pt. This AM was A&O. VSS with Systolic BP in low 100s. Tele NSR in 80s. CMS intact. R groin intact. Continued chest pain 3-4/10. Pt. Reported only brief relief with morphine overnight and did not want any at this time. Nitro gtt infusing. Pt. Had not been out of bed since operation. Writer sat pt. At bedside. Pt. [place] a little dizzy. /71. Sat for a min or two until dizziness resolved. Then stood pt. At bedside. Pt. Denied dizziness and took a couple steps to recliner chair. /60. Pt. Had no new complaints. Was situated in chair and reviewing menu. Writer left room. About a min. Later call light was going off. When writer entered room pt. Stated he didn't feel right, was pale, diaphoretic, and eye rolled back. Writer called for help. Pt. Responded to loud voice and touch. Speech was a little slurred. BP 79/47 with HR in the 50s. Chair was recline back, nitro stopped, RRT called. Pt. Was able to continue to communicate, remained A&O and speech cleared. 500 cc bolus of fluids started, blood sugar WNL. Neuros intact, weak. Color and BP improved. EKG done, placed on chart. Pt. Transferred into bed by lift. BP continues to improve with systolics in the 90s and low 100s and heart rate NSR in the 60s. Wife arrived at 9:10am and updated. Cardio MD was paged when pt. Stabilized, no return call. Leida NOGUEIRA present on unit, saw pt. And to update MD when on unit.

## 2018-05-11 NOTE — PHARMACY-ADMISSION MEDICATION HISTORY
Admission medication history interview status for the 5/10/2018  admission is complete. See EPIC admission navigator for prior to admission medications     Medication history source reliability:Good    Actions taken by pharmacist (provider contacted, etc):interview with patient.     Additional medication history information not noted on PTA med list : pt recently was instructed by MD to take vitamin D.  His first dose was yesterday.    Medication reconciliation/reorder completed by provider prior to medication history? No    Time spent in this activity: 10 min    Prior to Admission medications    Medication Sig Last Dose Taking? Auth Provider   VITAMIN D, CHOLECALCIFEROL, PO Take 3,000 Units by mouth daily 5/10/2018 at am Yes Unknown, Entered By History

## 2018-05-11 NOTE — PLAN OF CARE
Problem: Patient Care Overview  Goal: Plan of Care/Patient Progress Review  12 hour RN.  Pt A/Ox4.  Tmax 99.3.  All other VSS.  Tele SR with ST elevation.  Pt had 2 runs of vtach that was unsustained & pt was asymptomatic.   Pt had cath yesterday to right femoral.  Hematoma is noted.  Pt is still c/o 3 to 5/10 CP.  Pt was placed on Nitro gtt yesterday & rate has been titrated up to 0.57.   Pt did Cardiologist following & was updated around 2100 on ongoing CP.  Cardiologist ordered IV Morphine & it did bring CP down to 3/10.  Cardiologist did state that pt s pain will not fully go away.  Pt c/o ha.  Tylenol given for discomfort with relief.   Trop 72.576 at 2200.  EKG repeated at 0533 & appears to be improved.

## 2018-05-11 NOTE — CONSULTS
Standard post-angio consult received for heart healthy diet education  Pt currently with another care provider  Will plan to see pt prior to dc for diet teaching

## 2018-05-11 NOTE — PLAN OF CARE
Problem: Patient Care Overview  Goal: Plan of Care/Patient Progress Review  Discharge Planner OT   Patient plan for discharge: home  Current status: Eval completed and treatment initiated. Pt lives in a house with his wife and 15 yo son. Pt I with all ADL.IADL's works full time for the [employer]. Pt has a RRT this morning with hypotension, dizziness and brief loss of unconsciousness, OK'd for in room exercise and mobility as able. Pt able to stand and ambulate to BR and back for toileting with CGA/SBA for safety due to wooziness and reports fatigue and stood for LE CVC exercise standing activity approx 15 mins, BP blunted to stable see vital sign flow sheet for details. Feeling much better then earlier. pt appears motivated for OP CR and has supportive family. Initiated education in CAD risk factor with focus on exercise and low cholesterol diet and wife wanting to talk with a dietician.   Barriers to return to prior living situation: MI precautions, wooziness, anticipate no concerns for return home.   Recommendations for discharge:  Home with A with IADls' needed ie heavier yardwork and cleaning, work and driving per MD and OP CR at [facility].   Rationale for recommendations: OP CR for monitored progressive exercise and risk factor education and modification.        Entered by: Radha Dickerson 05/11/2018 2:58 PM

## 2018-05-11 NOTE — PLAN OF CARE
Problem: Patient Care Overview  Goal: Plan of Care/Patient Progress Review  Outcome: Improving  Pt. A&O. Tele NSR. No runs of V tach during shift. RRT this AM for drop in blood pressure with getting out of bed for first time (see event note for details). Nitro gtt stopped and bolus of fluids given. Held AM coreg and lisinopril. Improved with neuros intact. Pt. Bed rested this shift and dangled at bedside x1 at noon. IV fluids continue to infuse at 50 cc/hour. Tolerating meals. Systolic BP stable in the 90s-low 100s with heart rate in the 60s. Right groin site stable, ecchymotic. CMS intact. Lung sounds clear. Chest pressure improved from 3-5/10 to a constant 1/10. Started on Ranexa.       Pt. Participated in therapy this afternoon in the room. BP remained stable with sitting and standing, heart rate slightly tachy at  NSR upon standing. Felt a little woozy with sitting. Currently up in chair without dizziness and chest discomfort 1/10.

## 2018-05-11 NOTE — PROGRESS NOTES
05/11/18 1431   Quick Adds   Type of Visit Initial Occupational Therapy Evaluation   Living Environment   Lives With spouse;child(jasper), dependent   Living Arrangements house   Home Accessibility stairs within home;stairs to enter home   Number of Stairs Within Home 15  (basement)   Transportation Available car   Self-Care   Usual Activity Tolerance good   Current Activity Tolerance moderate   Regular Exercise yes   Activity/Exercise Type (was starting Insanity workout at home when had chest pain)   Activity/Exercise/Self-Care Comment Pt reports will workout for 6-9 months and get in shape and then weight increases will start exercising again   Functional Level Prior   Ambulation 0-->independent   Transferring 0-->independent   Toileting 0-->independent   Bathing 0-->independent   Dressing 0-->independent   Eating 0-->independent   Communication 0-->understands/communicates without difficulty   Swallowing 0-->swallows foods/liquids without difficulty   Cognition 0 - no cognition issues reported   Fall history within last six months no   Prior Functional Level Comment 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   General Information   Onset of Illness/Injury or Date of Surgery - Date 05/10/18   Referring Physician Mundo Torres   Patient/Family Goals Statement return home   Additional Occupational Profile Info/Pertinent History of Current Problem STEMI, had V-fib arrest, Status post balloon angioplasty of mid LCx at the ostium of OM1/OM2, OCT of OM1 and OM2 PCI and EVELYN across the mCx into the ostial OM2 (acute 100% occlusion) and PTCA of ostial OM1. troponin peaked to 103. This morning had a RRT when up in chair hypotensive with breif moment of unconsciousness. Per OK to stand and up in room, this afternoon.   Precautions/Limitations fall precautions  (MI precautions)   Heart Disease Risk Factors Dislipidemia;Age;Gender;Family history   Cognitive Status Examination   Orientation orientation to person,  place and time   Level of Consciousness alert   Able to Follow Commands WNL/WFL   Personal Safety (Cognitive) WNL/WFL   Memory intact   Attention No deficits were identified   Organization/Problem Solving No deficits were identified   Executive Function No deficits were identified   Sensory Examination   Sensory Quick Adds No deficits were identified   Pain Assessment   Patient Currently in Pain No   Transfer Skill: Bed to Chair/Chair to Bed   Level of [place]: Bed to Chair contact guard   Transfer Skill: Sit to Stand   Level of [place]: Sit/Stand contact guard   Balance   Balance Comments improved this morning after RRT hypotensive and dizziness and breif loss of conciuosness.    Lower Body Dressing   Level of [place]: Dress Lower Body stand-by assist   Instrumental Activities of Daily Living (IADL)   Previous Responsibilities work;driving;finances;medication management;yardwork;shopping;laundry;housekeeping;meal prep   Activities of Daily Living Analysis   Impairments Contributing to Impaired Activities of Daily Living post surgical precautions  (wooziness, unsteadiness more cautious due to RRT this am)   General Therapy Interventions   Planned Therapy Interventions risk factor education;progressive activity/exercise;home program guidelines;transfer training   Clinical Impression   Criteria for Skilled Therapeutic Interventions Met yes, treatment indicated   OT Diagnosis decreased IADl's   Influenced by the following impairments impaired activity tolerance, MI precautions, wooziness   Assessment of Occupational Performance 1-3 Performance Deficits   Identified Performance Deficits impaired IADL's ie return to work, driving, mowing lawn, heavier cleaning/lifting.   Clinical Decision Making (Complexity) Low complexity   Therapy Frequency 2 times/day   Predicted Duration of Therapy Intervention (days/wks) 3 days   Anticipated Discharge Disposition Home with Outpatient Therapy;Home with Assist  (A with  "IADl's as needed ie heavier cleaning and yard worketc)   Risks and Benefits of Treatment have been explained. Yes   Patient, Family & other staff in agreement with plan of care Yes   [facility] AM-PAC  \"6 Clicks\" Daily Activity Inpatient Short Form   1. Putting on and taking off regular lower body clothing? 3 - A Little   2. Bathing (including washing, rinsing, drying)? 3 - A Little   3. Toileting, which includes using toilet, bedpan or urinal? 3 - A Little   4. Putting on and taking off regular upper body clothing? 4 - None   5. Taking care of personal grooming such as brushing teeth? 4 - None   6. Eating meals? 4 - None   Daily Activity Raw Score (Score out of 24.Lower scores equate to lower levels of function) 21   Total Evaluation Time   Total Evaluation Time (Minutes) 10     "

## 2018-05-11 NOTE — CODE/RAPID RESPONSE
[facility]    RRT Note  5/11/2018   Time Called: 0803    RRT called for: decr LOC, syncope    Assessment & Plan   IMPRESSION & PLAN:  Syncope on trying to get up for the first time post STEMI.  Nursing staff denies any arrhythmias at the time no recent sedatives, last dose of morphine was at 3:19 AM.  Nitro infusion was off by the time of my arrival and patient was reclined in a chair and had regained consciousness.   initial BP 73/43, heart rate 64-78, 91% upon my arrival on 2 L nasal cannula, patient was pale dizzy slightly diaphoretic.  Nursing staff reports brief loss of consciousness probably 2-3 seconds.  Currently denies chest pain    Differential includes medication induced hypotension, vasovagal, hypovolemia, structural heart disease i.e. valvular disease though no murmurs appreciated on exam    Patient was admitted for STEMI on 5/10/2018    INTERVENTIONS:  NS IVF bolus 500mL while awaiting echo  Historian staff to help facilitate echo ASAP  Gluc 135  Hold NTG gtt  Discussed w/ RN holding am doses coreg, lisinopril and d/w cardiology  hgb 15--> 13g this am  Electrolytes reviewed, add on Mag    Discussed with bedside RN and defer further cares to cardiology    Interval History     Braulio Bunch is a 53 year old male who was admitted on 5/10/2018 for STEMI status post VF cardiac arrest prior to revascularization.  Status post defib ×1 with ROSC with additional torsades.  Status post PCI with revascularization of OM1 and OM 2.  Post angiogram located by right groin hematoma and nonsustained VT.    Medical history significant for:   Past Medical History:   Diagnosis Date     Kidney calculi    Status post minimally invasive parathyroidectomy  Hyperlipidemia  Vit D deficiency    Code Status: No Order    Aleksandra Dinero    Allergies   Allergies   Allergen Reactions     Augmented Betamethasone Diprop [Betamethasone]        Physical Exam   Vital Signs with Ranges:  Temp:  [98.2  F (36.8  C)-99.3  F  (37.4  C)] 99.3  F (37.4  C)  Pulse:  [82] 82  Heart Rate:  [64-94] 82  Resp:  [0-24] 9  BP: ()/(43-98) 81/45  SpO2:  [93 %-100 %] 93 %  I/O last 3 completed shifts:  In: 995 [P.O.:500; I.V.:495]  Out: 2675 [Urine:2675]    Constitutional: Middle-aged man sitting in the chair reclined unwell appearing pale  ENT: Deferred  Neck: supple  Pulmonary: CTAB upper & lower lobes  Cardiovascular: S1S2 normal sinus rhythm hypotensive  GI: NABS/s/NT/ND  Skin/Integumen: No mottling  Neuro: Follow commands to show 2 fingers wiggle feet bilaterally  Psych: Defer  Extremities: No edema    Data     EKG:  Interpreted by Aleksandra Dinero  Time reviewed: 09287  Symptoms at time of EKG: Weakness  Rhythm: normal sinus   Rate: Normal  Axis: Left Axis Deviation  Ectopy: none  Conduction: normal  ST Segments/ T Waves: ST Segement Elevation Inferior, and V5 V6 but less severe than EKG 1 day prior  Q Waves: I, II and III  Comparison to prior: Improved from 1 day prior    Clinical Impression: ST elevation potentially residual seen in inferior leads and V5 and V6      CBC with Diff:  Recent Labs   Lab Test  05/11/18   0526   WBC  10.0   HGB  13.7   MCV  87   PLT  270        Comprehensive Metabolic Panel:    Recent Labs  Lab 05/11/18  0526      POTASSIUM 4.0   CHLORIDE 107   CO2 26   ANIONGAP 6   *   BUN 9   CR 0.81   GFRESTIMATED >90   GFRESTBLACK >90   KELY 8.0*       Time Spent on this Encounter   I spent 20 minutes on the unit/floor managing the care of Braulio Bunch. Over 50% of my time was spent counseling the patient and/or coordinating care regarding services listed in this note.

## 2018-05-12 ENCOUNTER — APPOINTMENT (OUTPATIENT)
Dept: OCCUPATIONAL THERAPY | Facility: CLINIC | Age: 53
DRG: 246 | End: 2018-05-12
Payer: COMMERCIAL

## 2018-05-12 LAB
LACTATE BLD-SCNC: 1.3 MMOL/L (ref 0.7–2)
POTASSIUM SERPL-SCNC: 3.9 MMOL/L (ref 3.4–5.3)

## 2018-05-12 PROCEDURE — 25000132 ZZH RX MED GY IP 250 OP 250 PS 637: Performed by: INTERNAL MEDICINE

## 2018-05-12 PROCEDURE — 97110 THERAPEUTIC EXERCISES: CPT | Mod: GO

## 2018-05-12 PROCEDURE — 40000133 ZZH STATISTIC OT WARD VISIT

## 2018-05-12 PROCEDURE — 99233 SBSQ HOSP IP/OBS HIGH 50: CPT | Performed by: INTERNAL MEDICINE

## 2018-05-12 PROCEDURE — 84132 ASSAY OF SERUM POTASSIUM: CPT | Performed by: INTERNAL MEDICINE

## 2018-05-12 PROCEDURE — 25000132 ZZH RX MED GY IP 250 OP 250 PS 637: Performed by: NURSE PRACTITIONER

## 2018-05-12 PROCEDURE — 36415 COLL VENOUS BLD VENIPUNCTURE: CPT | Performed by: INTERNAL MEDICINE

## 2018-05-12 PROCEDURE — 21000001 ZZH R&B HEART CARE

## 2018-05-12 PROCEDURE — 83605 ASSAY OF LACTIC ACID: CPT | Performed by: INTERNAL MEDICINE

## 2018-05-12 PROCEDURE — 97535 SELF CARE MNGMENT TRAINING: CPT | Mod: GO

## 2018-05-12 RX ORDER — CARVEDILOL 3.12 MG/1
3.12 TABLET ORAL 2 TIMES DAILY WITH MEALS
Status: DISCONTINUED | OUTPATIENT
Start: 2018-05-12 | End: 2018-05-13 | Stop reason: HOSPADM

## 2018-05-12 RX ADMIN — COLCHICINE 0.6 MG: 0.6 TABLET, FILM COATED ORAL at 20:28

## 2018-05-12 RX ADMIN — RANOLAZINE 500 MG: 500 TABLET, FILM COATED, EXTENDED RELEASE ORAL at 20:28

## 2018-05-12 RX ADMIN — POTASSIUM CHLORIDE 20 MEQ: 1500 TABLET, EXTENDED RELEASE ORAL at 06:00

## 2018-05-12 RX ADMIN — RANOLAZINE 500 MG: 500 TABLET, FILM COATED, EXTENDED RELEASE ORAL at 09:38

## 2018-05-12 RX ADMIN — TICAGRELOR 90 MG: 90 TABLET ORAL at 20:28

## 2018-05-12 RX ADMIN — CARVEDILOL 6.25 MG: 6.25 TABLET, FILM COATED ORAL at 09:36

## 2018-05-12 RX ADMIN — LISINOPRIL 5 MG: 5 TABLET ORAL at 09:38

## 2018-05-12 RX ADMIN — TICAGRELOR 90 MG: 90 TABLET ORAL at 09:36

## 2018-05-12 RX ADMIN — COLCHICINE 0.6 MG: 0.6 TABLET, FILM COATED ORAL at 00:55

## 2018-05-12 RX ADMIN — COLCHICINE 0.6 MG: 0.6 TABLET, FILM COATED ORAL at 09:36

## 2018-05-12 RX ADMIN — CARVEDILOL 3.12 MG: 3.12 TABLET, FILM COATED ORAL at 18:31

## 2018-05-12 RX ADMIN — ASPIRIN 81 MG: 81 TABLET, COATED ORAL at 09:38

## 2018-05-12 RX ADMIN — ROSUVASTATIN CALCIUM 20 MG: 20 TABLET, FILM COATED ORAL at 20:28

## 2018-05-12 NOTE — PROGRESS NOTES
"JUSTIN HERNANDEZ MD, saw and evaluated Braulio Bunch is a 53 year old male who was admitted on 5/10/2018 for STEMI status post VF cardiac arrest prior to revascularization.  Status post defib ×1 with ROSC with additional torsades.  Status post PCI with revascularization of OM1 and OM 2.  Post angiogram located by right groin hematoma and nonsustained VT.   Rhythm is stable now with now further VT.     Medical history significant for:    Past Medical History         Past Medical History:   Diagnosis Date     Kidney calculi        Status post minimally invasive parathyroidectomy  Hyperlipidemia  Vit D deficiency    I personally reviewed the vital signs, medications, labs and maryin  Changed the Imdur to Ranexa 500 mg daily.  Echo with EF 50-55% with dense inferolateral hypokinesis.  High troponin peak.  Chest pain improving currently.     Key management decisions made by me: Likely discharge tomorrow.  Outpatient cardiac rehab.  Goal running the blood pressure under 120 mmHg to help with remodeling.  Follow-up with Karen.    With low BP and a bit wobbly => CSCU and rehab and plan home , Sunday, April 13    [facility] instruction started and done  [facility]  Cardiology Progress Note               Interval History:   Post Inferolateral MI  Post LCx stenting   Treated hyperlipidemia.  Post MI pericardiitis          Medications:   I have reviewed this patient's current medications    aspirin  81 mg Oral Daily     carvedilol  6.25 mg Oral BID     colchicine  0.6 mg Oral BID     lisinopril  5 mg Oral Daily     ranolazine  500 mg Oral BID     rosuvastatin  20 mg Oral Daily     ticagrelor  90 mg Oral Q12H                 Physical Exam:   Blood pressure 115/78, pulse 63, temperature 98.7  F (37.1  C), temperature source Oral, resp. rate 15, height 1.854 m (6' 1\"), weight 89.2 kg (196 lb 11.2 oz), SpO2 94 %.  HEENT - Unremarkable  Neck - No NVD or Bruit  Heart -RSR , no " murmur, no S4 or S3.  Lungs - clear to auscultation  Abdomen - soft w/o mass, pain or bruit  Extremeties -  No edema - right groin stable.         Data:   Recent labs, imaging, and other studies were reviewed.      Recent Labs  Lab 05/12/18  0515 05/11/18  0526 05/10/18  2145 05/10/18  0800   NA  --  139  --  140   POTASSIUM 3.9 4.0 3.8 3.2*   CHLORIDE  --  107  --  107   CO2  --  26  --  25   ANIONGAP  --  6  --  8   GLC  --  112*  --  143*   BUN  --  9  --  22   CR  --  0.81  --  1.13   GFRESTIMATED  --  >90  --  68   GFRESTBLACK  --  >90  --  82   KELY  --  8.0*  --  8.9       Recent Labs  Lab 05/11/18  0526 05/10/18  0800   WBC 10.0 5.9   HGB 13.7 15.1   HCT 39.6* 42.6   MCV 87 85    332       Recent Labs  Lab 05/12/18  0515 05/11/18  0526 05/10/18  2145 05/10/18  0800   NA  --  139  --  140   POTASSIUM 3.9 4.0 3.8 3.2*   CHLORIDE  --  107  --  107   CO2  --  26  --  25   ANIONGAP  --  6  --  8   GLC  --  112*  --  143*   BUN  --  9  --  22   CR  --  0.81  --  1.13   GFRESTIMATED  --  >90  --  68   GFRESTBLACK  --  >90  --  82   KELY  --  8.0*  --  8.9   MAG  --  2.0  --   --        Recent Labs  Lab 05/10/18  2145 05/10/18  1420 05/10/18  1110   TROPI 72.576* 103.129* 68.333*     Recent labs and studies reviewed.    ECHO:   Results for orders placed or performed during the hospital encounter of 05/10/18   Chest  XR, 1 view PORTABLE    Narrative    XR CHEST PORT 1 VW 5/10/2018 8:05 AM    COMPARISON: None.    HISTORY: ST elevation myocardial infarction.      Impression    IMPRESSION: Cardiac silhouette and pulmonary vasculature are within  normal limits. No focal airspace disease, pleural effusion or  pneumothorax.    MAYA SALDAÑA MD            Assessment and Plan:   I will do the following work-up and studies.

## 2018-05-12 NOTE — PROGRESS NOTES
Pt transferred to [facility], report given to accepting RN.  VSS and rhythm remains SR.  Right groin visualized at the bedside with accepting RN.  Groin remains ecchymotic with softening hematoma with negative bruit assessment.  Pt declines pain. Family at bedside.

## 2018-05-12 NOTE — PROVIDER NOTIFICATION
MD Notification    Notified Person: MD    Notified Person Name: imelda    Notification Date/Time: now  Notification Interaction: spoke with at bedside    Purpose of Notification: IVF, transfer, ear pain, meds    Orders Received:yes    Comments:

## 2018-05-12 NOTE — CONSULTS
NUTRITION EDUCATION    REASON FOR ASSESSMENT:  Nutrition education on American Heart Association (AHA) Heart Healthy Diet.    NUTRITION HISTORY:  Information obtained from patient.  Pt reports that he already follows a pretty healthy diet at home.  Breakfast:  Protein shake with orange juice, protein powder, and cristobal seeds OR [place]yn[place] Killer bread (21 grain) with natural Jiff peanut butter OR burrito with scrambled egg, cheddar cheese, very small amount of sausage crumble.  Lunch:  [place] with 21 grain bread with turkey or chicken, organic carrot sticks, celery, and peppers (all colors), 2-3 fruits (variety such as clementines, banana, apple, pineapple).    Dinner:  Chicken/turkey or tacos or sloppy joes, fruit and veg (zucchini, broccoli).  He has steak once per month.  HS:  Cereal with 2% milk   He drinks water all day.  He drinks coffee with added raw honey in the morning.  His wife will sometimes buy Trisuits or cheesy crackers or oreos or fig neutons - All of which are a temptation for him.  His highest wt is 225 lbs and lowest wt is 186 lbs.  He tries to keep his wt at ~ 200 lbs.  He plans to exercise in moderation for weight control.  He does not add salt to foods and is trying to be cognizant of salty containing foods.  He tends to avoid eating out as his son has a nut allergy and has to be careful.  He does enjoy eating unsalted nuts himself and they are currently working to reintroduce nuts for his son (almonds, walnuts).    Chol 199  TG 28  HDL 60   (H)    CURRENT DIET ORDER:  Low Saturated Fat, < 2400 mg Na    NUTRITION DIAGNOSIS:  Food- and nutrition-related knowledge deficit R/t no previous education provided AEB MD consult and pt request for heart heathy diet education.     INTERVENTIONS:  Nutrition Prescription:    Recommended AHA Heart Healthy Diet    Implementation:     Nutrition Education (Content):  a) reviewed the Nutrition chapter in the  Understanding Artery Disease  binder  b) reviewed  AHA Heart Healthy Diet guidelines    Nutrition Education (Application):  a) Discussed current eating habits and recommended alternative food choices    Anticipate good compliance    Diet Education - refer to Education flowsheet    Goals:    Patient verbalizes understanding of diet by stating he plans to continue to work further to improve his diet at home.  He will be careful about saturated fats and sodium.  He will continue to avoid processed foods.  He will continue to consume daily fruits and vegetables and whole grains.    All of the above goals met during education session    Follow Up/Monitoring:    Provided RD contact information for future questions    Josey Aceves, RD, LD, CNSC

## 2018-05-12 NOTE — PLAN OF CARE
Problem: Patient Care Overview  Goal: Plan of Care/Patient Progress Review  Outcome: Improving  Neuro: A/Ox4  CV/Rhythm: SR  Resp/O2: 2LNC   GI/Diet: Cardiac diet, Appetite good  : Voiding adequately  Skin/Incisions/Sites: Right groin site ecchymotic soft   Pulses/CMS: CMS intact (R) DP/PT 2+  Edema: None  Activity/Falls Risk: Bed alarm, Up with SBA   Lines/Drains/IV's: IVF 50cc/hr  Labs/BGM: No new labs   Tests/Procedures: No new tests/procedures  VS/Pain: VSS, CP with deep breathing, Colchicine started   D/C Plan/Overall Plan: CR to see, D/C 1-2 days   Other:

## 2018-05-12 NOTE — PLAN OF CARE
Problem: Patient Care Overview  Goal: Plan of Care/Patient Progress Review  Outcome: Improving  SLow moving today, but feeling better. Hesistant to be independent. Can transfer to [facility]. IVF stopped tolerating LSF diet. R groin bruised and sore to touch. Wife at bedside. Education begun. CR following. C/o r ear pain, monitoring, weaned off oxygen to RA. Plan for dc tomorrow.

## 2018-05-12 NOTE — PLAN OF CARE
"Problem: Patient Care Overview  Goal: Plan of Care/Patient Progress Review  Discharge Planner OT   Patient plan for discharge: home  Current status: /82, HR 93, and O2 97% on 2L while supine. Supine > sit with SBA. Pt reported dizziness once EOB. /82. Pt needed to sit EOB for a prolonged period of time due to diziness and light headedness. EOB > chair with CGA and unilateral UE support from IV pole. Pt demonstrated weakness with minimal movement. Educated pt on safe activities after dishcarge, exercise program, and OP CR. Pt's wife entered during session so education was also given to wife. Both demonstrated verbal understanding. Heart attack educational packet given to patient to continue to review during admission. Defer hallway ambulation until next session due to pt's report of dizziness and light headedness. /88, , and O2 97% on 2L at end of session.  Barriers to return to prior living situation: Diziness and light headedness with minimal movement, weakness   Recommendations for discharge:  Home with A with IADLs as needed and OP CR at [facility]   Rationale for recommendations: OP CR for monitored progressive exercise and risk factor education and modification.     PM session: Pt ambulated approximately 200 feet in hallway with SBA-CGA. Slow, uncertain movements demonstrated during ambulation as pt reported feeling \"stiff\" in R abdomen and R LE. Encouraged pt to focus on increasing length of steps. Long seated rest break needed following ambulation. Pt completed 15 stairs with SBA. Stable CV response noted during all activity. /73, HR 84, and O2 93% on room air at start of session. /77, HR 89, and O2 97% on room air following activity.  "

## 2018-05-13 ENCOUNTER — APPOINTMENT (OUTPATIENT)
Dept: OCCUPATIONAL THERAPY | Facility: CLINIC | Age: 53
DRG: 246 | End: 2018-05-13
Payer: COMMERCIAL

## 2018-05-13 VITALS
BODY MASS INDEX: 26.36 KG/M2 | DIASTOLIC BLOOD PRESSURE: 68 MMHG | RESPIRATION RATE: 16 BRPM | HEIGHT: 73 IN | TEMPERATURE: 98.3 F | HEART RATE: 63 BPM | WEIGHT: 198.9 LBS | SYSTOLIC BLOOD PRESSURE: 111 MMHG | OXYGEN SATURATION: 95 %

## 2018-05-13 PROBLEM — I21.9 ACUTE MYOCARDIAL INFARCTION (H): Status: ACTIVE | Noted: 2018-05-13

## 2018-05-13 LAB — POTASSIUM SERPL-SCNC: 4.2 MMOL/L (ref 3.4–5.3)

## 2018-05-13 PROCEDURE — 40000133 ZZH STATISTIC OT WARD VISIT

## 2018-05-13 PROCEDURE — 25000132 ZZH RX MED GY IP 250 OP 250 PS 637: Performed by: NURSE PRACTITIONER

## 2018-05-13 PROCEDURE — 25000132 ZZH RX MED GY IP 250 OP 250 PS 637: Performed by: INTERNAL MEDICINE

## 2018-05-13 PROCEDURE — 84132 ASSAY OF SERUM POTASSIUM: CPT | Performed by: INTERNAL MEDICINE

## 2018-05-13 PROCEDURE — 36415 COLL VENOUS BLD VENIPUNCTURE: CPT | Performed by: INTERNAL MEDICINE

## 2018-05-13 PROCEDURE — 97110 THERAPEUTIC EXERCISES: CPT | Mod: GO

## 2018-05-13 PROCEDURE — 99239 HOSP IP/OBS DSCHRG MGMT >30: CPT | Performed by: INTERNAL MEDICINE

## 2018-05-13 RX ORDER — CARVEDILOL 3.12 MG/1
3.12 TABLET ORAL 2 TIMES DAILY WITH MEALS
Qty: 180 TABLET | Refills: 3 | Status: SHIPPED | OUTPATIENT
Start: 2018-05-13 | End: 2018-05-25

## 2018-05-13 RX ORDER — ROSUVASTATIN CALCIUM 20 MG/1
20 TABLET, COATED ORAL DAILY
Qty: 90 TABLET | Refills: 3 | Status: SHIPPED | OUTPATIENT
Start: 2018-05-13 | End: 2018-05-25

## 2018-05-13 RX ORDER — RANOLAZINE 500 MG/1
500 TABLET, EXTENDED RELEASE ORAL 2 TIMES DAILY
Qty: 60 TABLET | Refills: 11 | Status: SHIPPED | OUTPATIENT
Start: 2018-05-13 | End: 2018-05-25

## 2018-05-13 RX ORDER — NITROGLYCERIN 0.4 MG/1
TABLET SUBLINGUAL
Qty: 25 TABLET | Refills: 3 | Status: SHIPPED | OUTPATIENT
Start: 2018-05-13 | End: 2018-05-25

## 2018-05-13 RX ORDER — LISINOPRIL 5 MG/1
5 TABLET ORAL DAILY
Qty: 90 TABLET | Refills: 3 | Status: SHIPPED | OUTPATIENT
Start: 2018-05-14 | End: 2018-05-14

## 2018-05-13 RX ADMIN — ASPIRIN 81 MG: 81 TABLET, COATED ORAL at 08:42

## 2018-05-13 RX ADMIN — RANOLAZINE 500 MG: 500 TABLET, FILM COATED, EXTENDED RELEASE ORAL at 08:42

## 2018-05-13 RX ADMIN — CARVEDILOL 3.12 MG: 3.12 TABLET, FILM COATED ORAL at 08:42

## 2018-05-13 RX ADMIN — LISINOPRIL 5 MG: 5 TABLET ORAL at 08:42

## 2018-05-13 RX ADMIN — COLCHICINE 0.6 MG: 0.6 TABLET, FILM COATED ORAL at 08:42

## 2018-05-13 RX ADMIN — TICAGRELOR 90 MG: 90 TABLET ORAL at 08:42

## 2018-05-13 NOTE — PLAN OF CARE
Problem: Patient Care Overview  Goal: Plan of Care/Patient Progress Review  Outcome: No Change  Pt came over from CCU around 1700. A/Ox4. VSS. Tele: NSR. LS clear. +bs, +flatus. Voiding adequately. Angiogram site bruised, soft, no bruit. Ambulating in halls SBA. Denies pain. Tolerating diet, encouraging fluids.

## 2018-05-13 NOTE — PROGRESS NOTES
JUSTIN HERNANDEZ MD, saw and evaluated Braulio Bunch is a 53 year old male who was admitted on 5/10/2018 for STEMI status post VF cardiac arrest prior to revascularization.  Status post defib ×1 with ROSC with additional torsades.  Status post PCI with revascularization of OM1 and OM 2.  Post angiogram located by right groin hematoma and nonsustained VT.   Rhythm is stable now with now further VT.     Medical history significant for:    Past Medical History         Past Medical History:   Diagnosis Date     Kidney calculi        Status post minimally invasive parathyroidectomy  Hyperlipidemia  Vit D deficiency    I personally reviewed the vital signs, medications, labs and imagin  Changed the Imdur to Ranexa 500 mg daily.  Echo with EF 50-55% with dense inferolateral hypokinesis.  High troponin peak.  Chest pain improving currently.     Key management decisions made by me: Likely discharge tomorrow.  Outpatient cardiac rehab.  Goal running the blood pressure under 120 mmHg to help with remodeling.  Follow-up with Karen.    With low BP and a bit wobbly => CSCU and rehab and plan home , Sunday, April 13    [facility] instruction started and done  [facility]  Cardiology Progress Note               Interval History:   Post Inferolateral MI  Post LCx stenting   Treated hyperlipidemia.  Post MI pericarditis    DC instructions reviewed again including Outpatient cardiac rehab.  Goal running the blood pressure under 120 mmHg to help with remodeling.  Follow-up with Karen.                Medications:   I have reviewed this patient's current medications    aspirin  81 mg Oral Daily     carvedilol  3.125 mg Oral BID w/meals     colchicine  0.6 mg Oral BID     lisinopril  5 mg Oral Daily     ranolazine  500 mg Oral BID     rosuvastatin  20 mg Oral Daily     ticagrelor  90 mg Oral Q12H                 Physical Exam:   Blood pressure 116/72, pulse 63, temperature  "99.1  F (37.3  C), temperature source Oral, resp. rate 16, height 1.854 m (6' 1\"), weight 90.2 kg (198 lb 14.4 oz), SpO2 95 %.  HEENT - Unremarkable  Neck - No NVD or Bruit  Heart -RSR , no murmur, no S4 or S3.  Lungs - clear to auscultation  Abdomen - soft w/o mass, pain or bruit  Extremeties -  No edema - right groin stable.         Data:   Recent labs, imaging, and other studies were reviewed.      Recent Labs  Lab 05/13/18  0545 05/12/18  0515 05/11/18  0526  05/10/18  0800   NA  --   --  139  --  140   POTASSIUM 4.2 3.9 4.0  < > 3.2*   CHLORIDE  --   --  107  --  107   CO2  --   --  26  --  25   ANIONGAP  --   --  6  --  8   GLC  --   --  112*  --  143*   BUN  --   --  9  --  22   CR  --   --  0.81  --  1.13   GFRESTIMATED  --   --  >90  --  68   GFRESTBLACK  --   --  >90  --  82   KELY  --   --  8.0*  --  8.9   < > = values in this interval not displayed.    Recent Labs  Lab 05/11/18  0526 05/10/18  0800   WBC 10.0 5.9   HGB 13.7 15.1   HCT 39.6* 42.6   MCV 87 85    332       Recent Labs  Lab 05/13/18  0545 05/12/18  0515 05/11/18  0526  05/10/18  0800   NA  --   --  139  --  140   POTASSIUM 4.2 3.9 4.0  < > 3.2*   CHLORIDE  --   --  107  --  107   CO2  --   --  26  --  25   ANIONGAP  --   --  6  --  8   GLC  --   --  112*  --  143*   BUN  --   --  9  --  22   CR  --   --  0.81  --  1.13   GFRESTIMATED  --   --  >90  --  68   GFRESTBLACK  --   --  >90  --  82   KELY  --   --  8.0*  --  8.9   MAG  --   --  2.0  --   --    < > = values in this interval not displayed.    Recent Labs  Lab 05/10/18  2145 05/10/18  1420 05/10/18  1110   TROPI 72.576* 103.129* 68.333*     Recent labs and studies reviewed.    ECHO:   Results for orders placed or performed during the hospital encounter of 05/10/18   Chest  XR, 1 view PORTABLE    Narrative    XR CHEST PORT 1 VW 5/10/2018 8:05 AM    COMPARISON: None.    HISTORY: ST elevation myocardial infarction.      Impression    IMPRESSION: Cardiac silhouette and pulmonary " vasculature are within  normal limits. No focal airspace disease, pleural effusion or  pneumothorax.    MAYA SALDAÑA MD            Assessment and Plan:   I will do the following work-up and studies.

## 2018-05-13 NOTE — DISCHARGE INSTRUCTIONS
Follow up with primary care doctor in 7-10 days. Inform of this hospitalization and new medications.

## 2018-05-13 NOTE — PLAN OF CARE
Problem: Patient Care Overview  Goal: Plan of Care/Patient Progress Review  Discharge Planner OT   Patient plan for discharge: home with OP CR at [facility]  Current status: Reviewed education regarding symptom monitoring during activity/exercise, signs and symptoms of heart attack, HEP, safe activities after dishcarge, and OP CR and pt demonstrated verbal understanding. Pt ambulated on treadmill x 10:00 at .08-1.2 mph with stable CV response and report of 2/10 on effort scale. /68, HR 93, and O2 97% on room air at start of session. /70 and  post activity.  Barriers to return to prior living situation: None  Recommendations for discharge:  Home with A with IADLs as needed and OP CR at [facility]   Rationale for recommendations: OP CR for monitored progressive exercise and risk factor education and modification.       Cardiac Rehab Discharge Summary    Reason for therapy discharge:    Discharged to home with outpatient therapy.    Progress towards therapy goal(s). See goals on Care Plan in [facility] electronic health record for goal details.  Goals partially met.  Barriers to achieving goals:   discharge from facility.    Therapy recommendation(s):    Continued therapy is recommended.  Rationale/Recommendations:  Pt would benefit from continued monitored exercise and risk factor education .

## 2018-05-13 NOTE — PROGRESS NOTES
BRIEF NUTRITION NOTE:    Was asked to see as both patient and wife Cande had many questions related to the heart healthy diet.  I did a recap for Cande of our session yesterday and provided reassurance that pt has been doing many things right.  Based on their questions, I provided additional handouts as below, which they appreciated.      Nutrition Education (Content):  a) Provided handouts:  Low-Sodium Foods and Drinks, Tips for Heart-Healthy Cooking and Eating, Nutrition Facts for Meat, Seasoning Your Food Without Salt, Heart-Healthy Recipes, Eating at Ethnic Restaurants, and Low-Sodium Recipes.   b) Discussed strategies for diet compliance.    Nutrition Education (Application):  a) Discussed eating habits and recommended alternative food choices    Patient verbalizes understanding of diet by stating he plans to keep a food journal to track his intake and stay on track.    Anticipate good compliance    They have RD name and number to call if further question.    Josey Aceves, MELODY, LD, CNSC

## 2018-05-13 NOTE — PLAN OF CARE
Problem: Patient Care Overview  Goal: Plan of Care/Patient Progress Review  Outcome: Improving  Neuro:intact  CV/Rhythm:SR  Resp/02:rr  GI/Diet:WDL  :voiding, bm 5/13  Skin/Incisions/Sites:bruised right groin  Pulses/CMS:intact  Edema:none  Activity/Falls Risk:I  Lines/Drains/IVs:PIVs dc'd  Labs/BGM:-  Test/Procedures:-  VS/Pain:stable, 0/10 pain  DC Plan:today, home with wife, follow up per cards, PCP, CR - all paper work reviewed, questions answered  Other:-

## 2018-05-13 NOTE — PLAN OF CARE
Problem: Patient Care Overview  Goal: Plan of Care/Patient Progress Review  Outcome: No Change  AAOx4. VSS. No c/o CP. SR with no ectopy noted. Up w/ SBA. R groin site is ecchymotic and tender, but no bruit appreciated. Plan is cardiac rehab again this morning and probably discharge later this afternoon. Continue to monitor.

## 2018-05-13 NOTE — DISCHARGE SUMMARY
Admit Date:     05/10/2018   Discharge Date:           Braulio Bunch is 53.  He was admitted by Dr. Giuliano Carter because he presented with severe chest pain associated with inferior wall ST elevation.  He was taken urgently to the cardiac catheterization lab.  Prior to his intervention, he had a ventricular fibrillation arrest and was shocked once back to sinus rhythm.  While in the cath lab he had recurrent runs of ventricular tachycardia with torsades.  He received IV beta blocker.  Coronary angiography was done urgently.  Angiography done 05/10/2018 showed a right dominant system.  The left main was short with no disease.  The LAD had multiple small diagonals and first septal perforators at the level of D2.  There was a 30% lesion.  The left circumflex had a medium-sized OM and a large OM2.  There was a 30% lesion of OM1 and total occlusion of OM2.  The RCA had mild irregularities and no severe stenoses.  A 4 mm x 16 mm Synergy drug-eluting stent was placed across the mid circumflex into the ostium of OM2.  This jailed OM1.  The stent was postdilated to 4.5 cm.  Re-perfusion was obtained.  His ventricular arrhythmias quieted and he remained in sinus rhythm subsequently.  His vessels were good sized and essentially disease free other than the rather focal lesions noted above.      Subsequently, with re-perfusion he had a troponin rise up to 103, prior to that 68, after that 72,  compatible with a significant inferolateral injury, but once again he had large coronary arteries and had a complete re-perfusion of his inferolateral wall post large OM stenting.      Electrolytes were normal, potassium 4.0, creatinine 0.8, BUN 9, hemoglobin 13.7 grams.      PAST MEDICAL HISTORY:  Positive for:   1.  Hyperlipidemia.   2.  Renal calculi.      MEDICATIONS PRIOR TO ADMISSION:  None.      MEDICATIONS ON DISCHARGE:  Included:   1.  Brilinta 90 mg b.i.d.   2.  Rosuvastatin 20 mg at bedtime.   3.  Ranexa 12.5 mg twice a day.    4.  Aspirin 81 mg a day.   5.  Carvedilol 3.125 mg a day.   6.  Lisinopril 5 mg a day.      He did have an episode of pleuritic chest pain day 1 post acute MI and revascularization.  It was felt this might well be post-infarction pericarditis.  He was given colchicine orally for 3 days and this was subsequently discontinued on discharge.  Should he have pleuritic pericarditic type pain in the future, nonsteroidals in appropriate doses would be indicated.  His pleuritic pain was decidedly different than the class anginal pain he presented with.        He should take for Brilinta uninterruptedly for a year until the anniversary of his stenting, which would be 05/2019.      Lisinopril and carvedilol can be adjusted for their ACE and beta blocker benefits, depending on his blood pressure, etc.  In the hospital he tended to run blood pressures in the 100-120 range systolic.  He did not have much in the way of dizziness.      Day 1 post-infarct, he got out of bed and had some hypotension and dizziness.  This was a bit of a concern, but his blood pressure normalized thereafter.  I believe it was a complex combination of orthostasis, some component of deconditioning, and the medications that were new to his system noted above.      PHYSICAL EXAMINATION ON DISCHARGE:     VITALS:  Revealed a blood pressure of 116/70, heart rate 80, O2 SATS 95%.  He weighs 198 pounds.     HEENT:  Head and neck were normal.     NECK:  Carotids were equal, no bruits heard.     HEART:  Irregular without gallop or murmur.   LUNGS:  Clear.   ABDOMEN:  Soft without organomegaly.   EXTREMITIES:  Free of edema.      Serial EKGs showed inferior ST elevation and the evolution of small inferior Q-waves.  This went along with his troponin rise, ST elevation, re-perfusion leading to a resolution of the ST elevation.      He had acute ventricular arrhythmias/ventricular fibrillation in the cath lab.  These resolved with re-perfusion and his relatively  stable issue otherwise.        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      He will follow up with Dr. Sebastian Vaughn in 10-14 days.  He is asked to see Dr. Man Shetty in cardiology followup.  Dr. Vaughn and Mr. Bunch can negotiate subsequent cardiology followup as needed.        I reviewed all the issues above with him.  I told him that he did have a myocardial infarction that his EF post-MI was around 50%-55% with inferolateral hypokinesia and no significant valvular abnormalities.  He was pleased and so was I.  He should do well.      IMPRESSION:   1.  Acute inferior wall inferolateral myocardial infarction due to:   2.  Large left circumflex OM branch to the inferolateral wall, receiving a 4 mm drug-eluting stent.   3.  No signs of heart failure.   4.  Acute rhythms as noted above with ventricular fibrillation that quieted with reperfusion.   5.  Treated hyperlipidemia.   6.  Borderline normal blood pressures.  Watch for hypotension.      I told him it might be worthwhile taking a week or two off from work, getting sort of back into the groove gently.  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      Prognosis is excellent.  Over 40 minutes was spent doing the complete consult as noted above.  Discharge prescriptions were provided.         BRIEN REYES MD, FACC             D: 2018   T: 2018   MT: CC      Name:     BRIEN BUNCH   MRN:      [id number]        Account:        [id number]   :      1965           Admit Date:     05/10/2018                                  Discharge Date:       Document: [id number]       cc: Josué Reyes MD, FACC

## 2018-05-14 ENCOUNTER — CARE COORDINATION (OUTPATIENT)
Dept: CARDIOLOGY | Facility: CLINIC | Age: 53
End: 2018-05-14

## 2018-05-14 DIAGNOSIS — I21.19 ST ELEVATION MYOCARDIAL INFARCTION (STEMI) INVOLVING OTHER CORONARY ARTERY OF INFERIOR WALL (H): ICD-10-CM

## 2018-05-14 RX ORDER — LISINOPRIL 5 MG/1
2.5 TABLET ORAL DAILY
Qty: 90 TABLET | Refills: 3 | COMMUNITY
Start: 2018-05-14 | End: 2018-05-25

## 2018-05-14 NOTE — PROGRESS NOTES
RN spoke with ANNALEE Goodman regarding pt's symptoms. Per Leida, pt to increase his fluid intake, take 2.5 mg Lisinopril starting tomorrow (1/2 tab) and call his PCP if diarrhea continues overnight. Pt stated he has increased his fluid intake and feels better this afternoon. He reported temps is around 99.3 last noc and today it's 98.9. Advised pt to call for temp > 101 and if groin site is red or swollen. Pt states his groin site is bruised without bleeding, redness or swelling. Pt has been taking his BP-range is 102-92/systolic. The twinges of chest pain lasts only 1-2 seconds up to 30 seconds. Advised pt to call if the pain increases or lasts several minutes.

## 2018-05-14 NOTE — PROGRESS NOTES
Patient was evaluated by cardiology while inpatient for STEMI status post VF cardiac arrest prior to revascularization. EVELYN to mid circumflex into the ostium of OM2. Called patient and left a VM to discuss any post hospital d/c questions he may have, review medication changes, and confirm f/u appts. RN confirmed in VM that they were d/c with the antiplatelet Brilinta.RN confirmed in  that he has an apt scheduled on 5/25/18 with ANNALEE Floresita Tyson. Patient advised to call clinic with any cardiac related questions or concerns prior to his/her apt. Patient verbalized understanding and agreed with plan.

## 2018-05-14 NOTE — PROGRESS NOTES
Patient called back. Reports he is feeling woozy -advised taking fluids. Pt stated he has not been drinking his normal amount of fluids since discharge. Also has twinges of chest pain not similar to his MI pain. It is milder than the pericarditis pain and feels it more when laying on his side. Does not feel the twinges upon taking a deep breath. He does report a low grade temp. He also started having diarrhea. It went from normal to watery today. Pt was on colchicine in the hospital. Pt wanted to know if he could take imodium. Will forward to ANNALEE.

## 2018-05-15 ENCOUNTER — HOSPITAL ENCOUNTER (OUTPATIENT)
Dept: CARDIAC REHAB | Facility: CLINIC | Age: 53
End: 2018-05-15
Attending: INTERNAL MEDICINE
Payer: COMMERCIAL

## 2018-05-15 DIAGNOSIS — I21.19 ST ELEVATION MYOCARDIAL INFARCTION (STEMI) INVOLVING OTHER CORONARY ARTERY OF INFERIOR WALL (H): ICD-10-CM

## 2018-05-15 LAB — INTERPRETATION ECG - MUSE: NORMAL

## 2018-05-15 PROCEDURE — 40000575 ZZH STATISTIC OP CARDIAC VISIT #2

## 2018-05-15 PROCEDURE — 93798 PHYS/QHP OP CAR RHAB W/ECG: CPT

## 2018-05-15 PROCEDURE — 40000116 ZZH STATISTIC OP CR VISIT

## 2018-05-15 PROCEDURE — 93797 PHYS/QHP OP CAR RHAB WO ECG: CPT

## 2018-05-16 LAB
INTERPRETATION ECG - MUSE: NORMAL

## 2018-05-18 ENCOUNTER — HOSPITAL ENCOUNTER (OUTPATIENT)
Dept: CARDIAC REHAB | Facility: CLINIC | Age: 53
End: 2018-05-18
Attending: INTERNAL MEDICINE
Payer: COMMERCIAL

## 2018-05-18 PROCEDURE — 93798 PHYS/QHP OP CAR RHAB W/ECG: CPT | Performed by: CLINICAL EXERCISE PHYSIOLOGIST

## 2018-05-18 PROCEDURE — 40000116 ZZH STATISTIC OP CR VISIT: Performed by: CLINICAL EXERCISE PHYSIOLOGIST

## 2018-05-19 ENCOUNTER — TELEPHONE (OUTPATIENT)
Dept: CARDIOLOGY | Facility: CLINIC | Age: 53
End: 2018-05-19

## 2018-05-19 NOTE — TELEPHONE ENCOUNTER
Pt called today with symptoms of dizziness and hypotension:  BP only 80/40.  Instructed pt to hold Lisinopril

## 2018-05-20 LAB — INTERPRETATION ECG - MUSE: NORMAL

## 2018-05-21 ENCOUNTER — HOSPITAL ENCOUNTER (OUTPATIENT)
Dept: CARDIAC REHAB | Facility: CLINIC | Age: 53
End: 2018-05-21
Attending: INTERNAL MEDICINE
Payer: COMMERCIAL

## 2018-05-21 PROCEDURE — 93798 PHYS/QHP OP CAR RHAB W/ECG: CPT | Performed by: OCCUPATIONAL THERAPIST

## 2018-05-21 PROCEDURE — 93797 PHYS/QHP OP CAR RHAB WO ECG: CPT | Performed by: OCCUPATIONAL THERAPIST

## 2018-05-21 PROCEDURE — 40000575 ZZH STATISTIC OP CARDIAC VISIT #2: Performed by: OCCUPATIONAL THERAPIST

## 2018-05-21 PROCEDURE — 40000116 ZZH STATISTIC OP CR VISIT: Performed by: OCCUPATIONAL THERAPIST

## 2018-05-23 ENCOUNTER — HOSPITAL ENCOUNTER (OUTPATIENT)
Dept: CARDIAC REHAB | Facility: CLINIC | Age: 53
End: 2018-05-23
Attending: INTERNAL MEDICINE
Payer: COMMERCIAL

## 2018-05-23 PROCEDURE — 93798 PHYS/QHP OP CAR RHAB W/ECG: CPT | Performed by: OCCUPATIONAL THERAPIST

## 2018-05-23 PROCEDURE — 40000116 ZZH STATISTIC OP CR VISIT: Performed by: OCCUPATIONAL THERAPIST

## 2018-05-25 ENCOUNTER — HOSPITAL ENCOUNTER (OUTPATIENT)
Dept: CARDIAC REHAB | Facility: CLINIC | Age: 53
End: 2018-05-25
Attending: INTERNAL MEDICINE
Payer: COMMERCIAL

## 2018-05-25 ENCOUNTER — OFFICE VISIT (OUTPATIENT)
Dept: CARDIOLOGY | Facility: CLINIC | Age: 53
End: 2018-05-25
Attending: NURSE PRACTITIONER
Payer: COMMERCIAL

## 2018-05-25 VITALS
HEART RATE: 62 BPM | DIASTOLIC BLOOD PRESSURE: 70 MMHG | HEIGHT: 73 IN | SYSTOLIC BLOOD PRESSURE: 108 MMHG | WEIGHT: 193 LBS | BODY MASS INDEX: 25.58 KG/M2

## 2018-05-25 DIAGNOSIS — I46.9 CARDIAC ARREST WITH VENTRICULAR FIBRILLATION (H): ICD-10-CM

## 2018-05-25 DIAGNOSIS — I21.19 ST ELEVATION MYOCARDIAL INFARCTION (STEMI) INVOLVING OTHER CORONARY ARTERY OF INFERIOR WALL (H): Primary | ICD-10-CM

## 2018-05-25 DIAGNOSIS — I49.01 CARDIAC ARREST WITH VENTRICULAR FIBRILLATION (H): ICD-10-CM

## 2018-05-25 DIAGNOSIS — E78.00 PURE HYPERCHOLESTEROLEMIA: ICD-10-CM

## 2018-05-25 DIAGNOSIS — I21.19 ST ELEVATION MYOCARDIAL INFARCTION (STEMI) INVOLVING OTHER CORONARY ARTERY OF INFERIOR WALL (H): ICD-10-CM

## 2018-05-25 LAB
ANION GAP SERPL CALCULATED.3IONS-SCNC: 10.9 MMOL/L (ref 6–17)
BUN SERPL-MCNC: 14 MG/DL (ref 7–30)
CALCIUM SERPL-MCNC: 9.7 MG/DL (ref 8.5–10.5)
CHLORIDE SERPL-SCNC: 101 MMOL/L (ref 98–107)
CO2 SERPL-SCNC: 29 MMOL/L (ref 23–29)
CREAT SERPL-MCNC: 1.4 MG/DL (ref 0.7–1.3)
GFR SERPL CREATININE-BSD FRML MDRD: 53 ML/MIN/1.7M2
GLUCOSE SERPL-MCNC: 97 MG/DL (ref 70–105)
POTASSIUM SERPL-SCNC: 3.9 MMOL/L (ref 3.5–5.1)
SODIUM SERPL-SCNC: 137 MMOL/L (ref 136–145)

## 2018-05-25 PROCEDURE — 40000116 ZZH STATISTIC OP CR VISIT

## 2018-05-25 PROCEDURE — 93798 PHYS/QHP OP CAR RHAB W/ECG: CPT

## 2018-05-25 PROCEDURE — 36415 COLL VENOUS BLD VENIPUNCTURE: CPT | Performed by: NURSE PRACTITIONER

## 2018-05-25 PROCEDURE — 99215 OFFICE O/P EST HI 40 MIN: CPT | Performed by: NURSE PRACTITIONER

## 2018-05-25 PROCEDURE — 80048 BASIC METABOLIC PNL TOTAL CA: CPT | Performed by: NURSE PRACTITIONER

## 2018-05-25 RX ORDER — RANOLAZINE 500 MG/1
500 TABLET, EXTENDED RELEASE ORAL 2 TIMES DAILY
Qty: 180 TABLET | Refills: 3 | Status: SHIPPED | OUTPATIENT
Start: 2018-05-25 | End: 2018-07-30

## 2018-05-25 RX ORDER — ROSUVASTATIN CALCIUM 20 MG/1
20 TABLET, COATED ORAL DAILY
Qty: 90 TABLET | Refills: 3 | Status: SHIPPED | OUTPATIENT
Start: 2018-05-25 | End: 2019-07-31

## 2018-05-25 RX ORDER — METOPROLOL SUCCINATE 25 MG/1
25 TABLET, EXTENDED RELEASE ORAL AT BEDTIME
Qty: 90 TABLET | Refills: 3 | Status: SHIPPED | OUTPATIENT
Start: 2018-05-25 | End: 2019-04-08

## 2018-05-25 RX ORDER — NITROGLYCERIN 0.4 MG/1
TABLET SUBLINGUAL
Qty: 25 TABLET | Refills: 3 | Status: SHIPPED | OUTPATIENT
Start: 2018-05-25 | End: 2019-06-03

## 2018-05-25 NOTE — PROGRESS NOTES
History of Present Illness:    Barulio Bunch is a 53 year old male who recently met Dr. Carter when he presented to the hospital May 10th with a STEMI.  He presents today for post hospital follow-up.    This patient has a history of dyslipidemia and had been taken off simvastatin for 4 years.  He reestablish care at his primary clinic and had a lipid profile done which was elevated.  He was told to embark on an exercise plan.  The following morning went to his basement and attempted to do the insanity aerobic workout videotape.  During the warmup he began to have acute substernal chest discomfort.  He was taken to [facility] by ambulance.  He was found to be having an inferior wall MI with ST segment elevation.  On the way to the Cath Lab he had a ventricular fibrillation arrest.  He was shocked back to sinus rhythm but continued to have episodes of torsades V. tach treated with IV beta-blocker and rapid angiogram with angioplasty.  Troponin peaked at 103      His angiogram was done via right femoral artery approach.  His LAD had 30% stenosis.  The circumflex had 100% occlusion of the ostial OM 2 branch.  The right coronary had diffuse disease with mild luminal irregularities.  The OM 2 was treated with a 0.0 x 16 mm Synergy drug-eluting stent deployed across the mid circumflex into the ostium of the OM 2 with jailing of the OM1.  This was postdilated with reestablishment of flow.  OCT was performed noting the diameter of the mid circumflex to be 4.01 mm and ostial OM to 3.85 mm.    Postprocedure the patient had some atypical chest discomfort.  He was tried on isosorbide but had low blood pressures.  He was switched to Ranexa 500 mg twice daily and tried on a few doses of colchicine for assumed pericarditis.  He had no further arrhythmias throughout his hospital course on cardiac rehab.    Follow-up echocardiogram revealed a left ventricular function at 50-55% with severe inferolateral wall  "hypokinesis.  No significant valvular disease.    He was started on Crestor therapy as well.    He called our office a couple of times since discharge complaining of some diarrhea and dizziness.  His lisinopril was reduced to 2.5 mg per day.  He called Dr. Brown on-call a week ago complaining of lightheadedness with blood pressures of 80.  His lisinopril was discontinued at that time.    He continues to feel a bit whoozy at times with dizziness and feeling foggy.  His blood pressure still soft at around 108.    He describes two different types of chest discomfort since he has been home.  One is along the left sternal border, fourth intercostal space in a very localized spot where sharp \"twinges\" of pain come and go.  These last seconds.  A second type of pain is described as a pectoral cramping that is nonexertional.  This is coming on a few times since he has been home.  One day he had this on and off throughout the day.  He has not taken nitroglycerin for this.  None of his symptoms are exertional and he states none of these feel anything like what his pain did when he came in to the hospital with substernal chest heaviness.    He said no chest discomfort reported during cardiac rehab.    He has a maternal grand father who had sudden death at age 61 assumed to be an MI.  He has multiple aunts and uncles on his mother's side that also have premature coronary disease.    His HDL in the hospital was 60  triglyceride 28.    He is taking aspirin and Brilinta as prescribed.    He has a history of migraine headaches and historically takes Excedrin.  It would likely be safe for him to sparingly use Excedrin tension which does not have any aspirin component, just caffeine and Tylenol.    Exam today reveals a nontender chest.  Lungs are clear.  He has a soft S4 gallop.  Her right groin is still quite ecchymotic's extending over the pubic bone into the scrotum distal to the inguinal fold and laterally to the buttocks.  " This is softening.  There is a nodularity over the inguinal fold and femoral site.  This is nontender.  There is no hum or bruit.    Distal pulses are 1+.    Creatinine on admission the hospital was 1.13.  Post procedure 0.8.  Today 1.4 off of ACE inhibitor for a week.  Received 275 cc of contrast dye during his procedure    He is a  and is taking 2 months off work to recover.      Impression/Plan:     1.  STEMI  -Peak troponin I 03  -Occluded OM 2 at the ostium treated with a Synergy drug-eluting stent into the circumflex/OM to ostium with jailing of the OM1 which was ballooned.  No other flow limiting disease noted  -Is having some atypical chest discomfort since being home.  He denies any symptoms reminiscent of his heart attack symptoms  -Because of low blood pressures and feeling foggy I will stop his carvedilol and switch to metoprolol XL 25 mg a day at bedtime  -Remain off of lisinopril as LV function is relatively well preserved  -I will see him back in approximately 3 weeks for reassessment.  If chest pain continues I may consider a stress echocardiogram at that point  -Continue aspirin with Brilinta for 1 year (5-)    2.  V. fib arrest on the way to the Cath Lab with intermittent torsades after defibrillated  -No further arrhythmia since  -LV function 50-55%  -Continue beta-blockers as outlined above    3.  Dyslipidemia  -Continue Crestor  -recheck labs in July when he sees Dr. Carter in follow-up    4.  Vitamin D deficiency  -On replacement as ordered by primary care    5.  Mild acute kidney injury with elevated creatinine 1.4  -Remain off Lisinopril  -Continue hydration  -Recheck labs within 2 weeks    It has been a pleasure seeing Braulio Bunch in follow up.  I will see him back in approximately 3 weeks.  We will check a basic metabolic panel in 2 weeks.  For any sustained substernal chest pain reminiscent of his previous discomfort I told him to call 911 and report to the emergency  department.  Greater than 50% of this 45 minute visit today was spent in counseling    Floresita Tyson, MSN, APRN-BC, CNP  Cardiology    Orders Placed This Encounter   Procedures     Basic metabolic panel     Lipid Profile     ALT     Follow-Up with Cardiac Advanced Practice Provider     Orders Placed This Encounter   Medications     metoprolol succinate (TOPROL-XL) 25 MG 24 hr tablet     Sig: Take 1 tablet (25 mg) by mouth At Bedtime     Dispense:  90 tablet     Refill:  3     ticagrelor (BRILINTA) 90 MG tablet     Sig: Take 1 tablet (90 mg) by mouth every 12 hours     Dispense:  180 tablet     Refill:  3     Will call when he needs this filled     ranolazine (RANEXA) 500 MG 12 hr tablet     Sig: Take 1 tablet (500 mg) by mouth 2 times daily     Dispense:  180 tablet     Refill:  3     Will call when needs filled     rosuvastatin (CRESTOR) 20 MG tablet     Sig: Take 1 tablet (20 mg) by mouth daily     Dispense:  90 tablet     Refill:  3     Will will when needs     nitroGLYcerin (NITROSTAT) 0.4 MG sublingual tablet     Sig: For chest pain place 1 tablet under the tongue every 5 minutes for 3 doses. If symptoms persist 5 minutes after 1st dose call 911.     Dispense:  25 tablet     Refill:  3     Will call when needs this filled     Medications Discontinued During This Encounter   Medication Reason     lisinopril (PRINIVIL/ZESTRIL) 5 MG tablet Discontinued by another Health Care Provider     carvedilol (COREG) 3.125 MG tablet      ticagrelor (BRILINTA) 90 MG tablet Reorder     ranolazine (RANEXA) 500 MG 12 hr tablet Reorder     rosuvastatin (CRESTOR) 20 MG tablet Reorder     nitroGLYcerin (NITROSTAT) 0.4 MG sublingual tablet Reorder         Encounter Diagnoses   Name Primary?     ST elevation myocardial infarction (STEMI) involving other coronary artery of inferior wall (H) Yes     Cardiac arrest with ventricular fibrillation (H)      Pure hypercholesterolemia        CURRENT MEDICATIONS:  Current Outpatient  Prescriptions   Medication Sig Dispense Refill     aspirin 81 MG EC tablet Take 1 tablet (81 mg) by mouth daily 30 tablet 11     metoprolol succinate (TOPROL-XL) 25 MG 24 hr tablet Take 1 tablet (25 mg) by mouth At Bedtime 90 tablet 3     nitroGLYcerin (NITROSTAT) 0.4 MG sublingual tablet For chest pain place 1 tablet under the tongue every 5 minutes for 3 doses. If symptoms persist 5 minutes after 1st dose call 911. 25 tablet 3     ranolazine (RANEXA) 500 MG 12 hr tablet Take 1 tablet (500 mg) by mouth 2 times daily 180 tablet 3     rosuvastatin (CRESTOR) 20 MG tablet Take 1 tablet (20 mg) by mouth daily 90 tablet 3     ticagrelor (BRILINTA) 90 MG tablet Take 1 tablet (90 mg) by mouth every 12 hours 180 tablet 3     VITAMIN D, CHOLECALCIFEROL, PO Take 3,000 Units by mouth daily       [DISCONTINUED] nitroGLYcerin (NITROSTAT) 0.4 MG sublingual tablet For chest pain place 1 tablet under the tongue every 5 minutes for 3 doses. If symptoms persist 5 minutes after 1st dose call 911. 25 tablet 3     [DISCONTINUED] ranolazine (RANEXA) 500 MG 12 hr tablet Take 1 tablet (500 mg) by mouth 2 times daily 60 tablet 11     [DISCONTINUED] rosuvastatin (CRESTOR) 20 MG tablet Take 1 tablet (20 mg) by mouth daily 90 tablet 3     [DISCONTINUED] ticagrelor (BRILINTA) 90 MG tablet Take 1 tablet (90 mg) by mouth every 12 hours 180 tablet 3       ALLERGIES     Allergies   Allergen Reactions     Augmented Betamethasone Diprop [Betamethasone]        PAST MEDICAL HISTORY:  Past Medical History:   Diagnosis Date     Acute myocardial infarction 5/13/2018     CAD (coronary artery disease)     Cath 5/10/18- PCI with revascularization to OM- EVELYN placed; jailing of OM-1--dilated lad 30%; mild disease elsewhere     Hyperlipidemia      Kidney calculi      STEMI (ST elevation myocardial infarction) (H) 5/10/2018     V-tach (H)     during hospitalization 5/2018     Ventricular fibrillation (H)     5/10/18 in ER       PAST SURGICAL HISTORY:  Past  "Surgical History:   Procedure Laterality Date     PARATHYROIDECTOMY         FAMILY HISTORY:  No family history on file.    SOCIAL HISTORY:  Social History     Social History     Marital status:      Spouse name: N/A     Number of children: N/A     Years of education: N/A     Social History Main Topics     Smoking status: Never Smoker     Smokeless tobacco: Never Used     Alcohol use None     Drug use: None     Sexual activity: Not Asked     Other Topics Concern     None     Social History Narrative       Review of Systems:  Skin:  Negative       Eyes:  Positive for glasses    ENT:  Negative      Respiratory:  Negative       Cardiovascular:    Positive for;lightheadedness;dizziness (occasional twinges)    Gastroenterology: Negative      Genitourinary:  Positive for  (Hx kidney stones)    Musculoskeletal:  Negative      Neurologic:  Positive for  (Hx migraines )    Psychiatric:  Negative      Heme/Lymph/Imm:  Negative      Endocrine:  Negative        Physical Exam:  Vitals: /70  Pulse 62  Ht 1.854 m (6' 1\")  Wt 87.5 kg (193 lb)  BMI 25.46 kg/m2    Constitutional:  cooperative, alert and oriented, well developed, well nourished, in no acute distress        Skin:  warm and dry to the touch, no apparent skin lesions or masses noted          Head:  normocephalic, no masses or lesions        Eyes:  pupils equal and round, conjunctivae and lids unremarkable, sclera white, no xanthalasma, EOMS intact, no nystagmus        Lymph:      ENT:  no pallor or cyanosis, dentition good        Neck:  carotid pulses are full and equal bilaterally, JVP normal, no carotid bruit        Respiratory:  normal breath sounds, clear to auscultation, normal A-P diameter, normal symmetry, normal respiratory excursion, no use of accessory muscles         Cardiac: regular rhythm;normal S1 and S2   S4                                                right groin    GI:           Extremities and Muscular Skeletal:  no deformities, " clubbing, cyanosis, erythema observed;no edema              Neurological:  no gross motor deficits        Psych:  Alert and Oriented x 3      Recent Lab Results:  LIPID RESULTS:  Lab Results   Component Value Date    CHOL 199 05/10/2018    HDL 60 05/10/2018     (H) 05/10/2018    TRIG 28 05/10/2018       LIVER ENZYME RESULTS:  No results found for: AST, ALT    CBC RESULTS:  Lab Results   Component Value Date    WBC 10.0 05/11/2018    RBC 4.58 05/11/2018    HGB 13.7 05/11/2018    HCT 39.6 (L) 05/11/2018    MCV 87 05/11/2018    MCH 29.9 05/11/2018    MCHC 34.6 05/11/2018    RDW 12.5 05/11/2018     05/11/2018       BMP RESULTS:  Lab Results   Component Value Date     05/25/2018    POTASSIUM 3.9 05/25/2018    CHLORIDE 101 05/25/2018    CO2 29 05/25/2018    ANIONGAP 10.9 05/25/2018    GLC 97 05/25/2018    BUN 14 05/25/2018    CR 1.40 (H) 05/25/2018    GFRESTIMATED 53 (L) 05/25/2018    GFRESTBLACK 64 05/25/2018    EKLY 9.7 05/25/2018        A1C RESULTS:  No results found for: A1C    INR RESULTS:  No results found for: INR        CC  ENRIKE Calvo CNP  [facility]  [id number]  [place] [id number]

## 2018-05-25 NOTE — LETTER
5/25/2018    Josué Vaughn MD  [facility] [address]  [facility] [address]    RE: Braulio Bunch       Dear Colleague,    I had the pleasure of seeing Braulio Bunch in the [facility].    History of Present Illness:    Braulio Bunch is a 53 year old male who recently met Dr. Carter when he presented to the hospital May 10th with a STEMI.  He presents today for post hospital follow-up.    This patient has a history of dyslipidemia and had been taken off simvastatin for 4 years.  He reestablish care at his primary clinic and had a lipid profile done which was elevated.  He was told to embark on an exercise plan.  The following morning went to his basement and attempted to do the insanity aerobic workout videotape.  During the warmup he began to have acute substernal chest discomfort.  He was taken to [facility] by ambulance.  He was found to be having an inferior wall MI with ST segment elevation.  On the way to the Cath Lab he had a ventricular fibrillation arrest.  He was shocked back to sinus rhythm but continued to have episodes of torsades V. tach treated with IV beta-blocker and rapid angiogram with angioplasty.  Troponin peaked at 103      His angiogram was done via right femoral artery approach.  His LAD had 30% stenosis.  The circumflex had 100% occlusion of the ostial OM 2 branch.  The right coronary had diffuse disease with mild luminal irregularities.  The OM 2 was treated with a 0.0 x 16 mm Synergy drug-eluting stent deployed across the mid circumflex into the ostium of the OM 2 with jailing of the OM1.  This was postdilated with reestablishment of flow.  OCT was performed noting the diameter of the mid circumflex to be 4.01 mm and ostial OM to 3.85 mm.    Postprocedure the patient had some atypical chest discomfort.  He was tried on isosorbide but had low blood pressures.  He was switched to Ranexa 500 mg twice daily and tried on a few  "doses of colchicine for assumed pericarditis.  He had no further arrhythmias throughout his hospital course on cardiac rehab.    Follow-up echocardiogram revealed a left ventricular function at 50-55% with severe inferolateral wall hypokinesis.  No significant valvular disease.    He was started on Crestor therapy as well.    He called our office a couple of times since discharge complaining of some diarrhea and dizziness.  His lisinopril was reduced to 2.5 mg per day.  He called Dr. Brown on-call a week ago complaining of lightheadedness with blood pressures of 80.  His lisinopril was discontinued at that time.    He continues to feel a bit whoozy at times with dizziness and feeling foggy.  His blood pressure still soft at around 108.    He describes two different types of chest discomfort since he has been home.  One is along the left sternal border, fourth intercostal space in a very localized spot where sharp \"twinges\" of pain come and go.  These last seconds.  A second type of pain is described as a pectoral cramping that is nonexertional.  This is coming on a few times since he has been home.  One day he had this on and off throughout the day.  He has not taken nitroglycerin for this.  None of his symptoms are exertional and he states none of these feel anything like what his pain did when he came in to the hospital with substernal chest heaviness.    He said no chest discomfort reported during cardiac rehab.    He has a maternal grand father who had sudden death at age 61 assumed to be an MI.  He has multiple aunts and uncles on his mother's side that also have premature coronary disease.    His HDL in the hospital was 60  triglyceride 28.    He is taking aspirin and Brilinta as prescribed.    He has a history of migraine headaches and historically takes Excedrin.  It would likely be safe for him to sparingly use Excedrin tension which does not have any aspirin component, just caffeine and " Tylenol.    Exam today reveals a nontender chest.  Lungs are clear.  He has a soft S4 gallop.  Her right groin is still quite ecchymotic's extending over the pubic bone into the scrotum distal to the inguinal fold and laterally to the buttocks.  This is softening.  There is a nodularity over the inguinal fold and femoral site.  This is nontender.  There is no hum or bruit.    Distal pulses are 1+.    Creatinine on admission the hospital was 1.13.  Post procedure 0.8.  Today 1.4 off of ACE inhibitor for a week.  Received 275 cc of contrast dye during his procedure    As a  and is taking 2 months off work to recover.      Impression/Plan:     1.  STEMI  -Peak troponin I 03  -Occluded OM 2 at the ostium treated with a Synergy drug-eluting stent into the circumflex/OM to ostium with jailing of the OM1 which was ballooned.  No other flow limiting disease noted  -Is having some atypical chest discomfort since being home.  He denies any symptoms reminiscent of his heart attack symptoms  -Because of low blood pressures and feeling foggy I will stop his carvedilol and switch to metoprolol XL 25 mg a day at bedtime  -Remain off of lisinopril as LV function is relatively well preserved  -I will see him back in approximately 3 weeks for reassessment.  If chest pain continues I may consider a stress echocardiogram at that point  -Continue aspirin with Brilinta for 1 year (5-)    2.  V. fib arrest on the way to the Cath Lab with intermittent torsades after defibrillated  -No further arrhythmia since  -LV function 50-55%  -Continue beta-blockers as outlined above    3.  Dyslipidemia  -Continue Crestor  -recheck labs in July when he sees Dr. Carter in follow-up    4.  Vitamin D deficiency  -On replacement as ordered by primary care    5.  Mild acute kidney injury with elevated creatinine 1.4  -Remain off Lasix  -Continue hydration  -Recheck labs within 2 weeks    It has been a pleasure seeing Braulio Bunch in  follow up.  I will see him back in approximately 3 weeks.  We will check a basic metabolic panel in 2 weeks.  For any sustained substernal chest pain reminiscent of his previous discomfort I told him to call 911 and report to the emergency department.  Greater than 50% of this 45 minute visit today was spent in counseling    Floresita Tyson, MSN, APRN-BC, CNP  Cardiology    Orders Placed This Encounter   Procedures     Basic metabolic panel     Lipid Profile     ALT     Follow-Up with Cardiac Advanced Practice Provider     Orders Placed This Encounter   Medications     metoprolol succinate (TOPROL-XL) 25 MG 24 hr tablet     Sig: Take 1 tablet (25 mg) by mouth At Bedtime     Dispense:  90 tablet     Refill:  3     ticagrelor (BRILINTA) 90 MG tablet     Sig: Take 1 tablet (90 mg) by mouth every 12 hours     Dispense:  180 tablet     Refill:  3     Will call when he needs this filled     ranolazine (RANEXA) 500 MG 12 hr tablet     Sig: Take 1 tablet (500 mg) by mouth 2 times daily     Dispense:  180 tablet     Refill:  3     Will call when needs filled     rosuvastatin (CRESTOR) 20 MG tablet     Sig: Take 1 tablet (20 mg) by mouth daily     Dispense:  90 tablet     Refill:  3     Will will when needs     nitroGLYcerin (NITROSTAT) 0.4 MG sublingual tablet     Sig: For chest pain place 1 tablet under the tongue every 5 minutes for 3 doses. If symptoms persist 5 minutes after 1st dose call 911.     Dispense:  25 tablet     Refill:  3     Will call when needs this filled     Medications Discontinued During This Encounter   Medication Reason     lisinopril (PRINIVIL/ZESTRIL) 5 MG tablet Discontinued by another Health Care Provider     carvedilol (COREG) 3.125 MG tablet      ticagrelor (BRILINTA) 90 MG tablet Reorder     ranolazine (RANEXA) 500 MG 12 hr tablet Reorder     rosuvastatin (CRESTOR) 20 MG tablet Reorder     nitroGLYcerin (NITROSTAT) 0.4 MG sublingual tablet Reorder         Encounter Diagnoses   Name Primary?      ST elevation myocardial infarction (STEMI) involving other coronary artery of inferior wall (H) Yes     Cardiac arrest with ventricular fibrillation (H)      Pure hypercholesterolemia        CURRENT MEDICATIONS:  Current Outpatient Prescriptions   Medication Sig Dispense Refill     aspirin 81 MG EC tablet Take 1 tablet (81 mg) by mouth daily 30 tablet 11     metoprolol succinate (TOPROL-XL) 25 MG 24 hr tablet Take 1 tablet (25 mg) by mouth At Bedtime 90 tablet 3     nitroGLYcerin (NITROSTAT) 0.4 MG sublingual tablet For chest pain place 1 tablet under the tongue every 5 minutes for 3 doses. If symptoms persist 5 minutes after 1st dose call 911. 25 tablet 3     ranolazine (RANEXA) 500 MG 12 hr tablet Take 1 tablet (500 mg) by mouth 2 times daily 180 tablet 3     rosuvastatin (CRESTOR) 20 MG tablet Take 1 tablet (20 mg) by mouth daily 90 tablet 3     ticagrelor (BRILINTA) 90 MG tablet Take 1 tablet (90 mg) by mouth every 12 hours 180 tablet 3     VITAMIN D, CHOLECALCIFEROL, PO Take 3,000 Units by mouth daily       [DISCONTINUED] nitroGLYcerin (NITROSTAT) 0.4 MG sublingual tablet For chest pain place 1 tablet under the tongue every 5 minutes for 3 doses. If symptoms persist 5 minutes after 1st dose call 911. 25 tablet 3     [DISCONTINUED] ranolazine (RANEXA) 500 MG 12 hr tablet Take 1 tablet (500 mg) by mouth 2 times daily 60 tablet 11     [DISCONTINUED] rosuvastatin (CRESTOR) 20 MG tablet Take 1 tablet (20 mg) by mouth daily 90 tablet 3     [DISCONTINUED] ticagrelor (BRILINTA) 90 MG tablet Take 1 tablet (90 mg) by mouth every 12 hours 180 tablet 3       ALLERGIES     Allergies   Allergen Reactions     Augmented Betamethasone Diprop [Betamethasone]        PAST MEDICAL HISTORY:  Past Medical History:   Diagnosis Date     Acute myocardial infarction 5/13/2018     CAD (coronary artery disease)     Cath 5/10/18- PCI with revascularization to OM- EVELYN placed; jailing of OM-1--dilated lad 30%; mild disease elsewhere      "Hyperlipidemia      Kidney calculi      STEMI (ST elevation myocardial infarction) (H) 5/10/2018     V-tach (H)     during hospitalization 5/2018     Ventricular fibrillation (H)     5/10/18 in ER       PAST SURGICAL HISTORY:  Past Surgical History:   Procedure Laterality Date     PARATHYROIDECTOMY         FAMILY HISTORY:  No family history on file.    SOCIAL HISTORY:  Social History     Social History     Marital status:      Spouse name: N/A     Number of children: N/A     Years of education: N/A     Social History Main Topics     Smoking status: Never Smoker     Smokeless tobacco: Never Used     Alcohol use None     Drug use: None     Sexual activity: Not Asked     Other Topics Concern     None     Social History Narrative       Review of Systems:  Skin:  Negative       Eyes:  Positive for glasses    ENT:  Negative      Respiratory:  Negative       Cardiovascular:    Positive for;lightheadedness;dizziness (occasional twinges)    Gastroenterology: Negative      Genitourinary:  Positive for  (Hx kidney stones)    Musculoskeletal:  Negative      Neurologic:  Positive for  (Hx migraines )    Psychiatric:  Negative      Heme/Lymph/Imm:  Negative      Endocrine:  Negative        Physical Exam:  Vitals: /70  Pulse 62  Ht 1.854 m (6' 1\")  Wt 87.5 kg (193 lb)  BMI 25.46 kg/m2    Constitutional:  cooperative, alert and oriented, well developed, well nourished, in no acute distress        Skin:  warm and dry to the touch, no apparent skin lesions or masses noted          Head:  normocephalic, no masses or lesions        Eyes:  pupils equal and round, conjunctivae and lids unremarkable, sclera white, no xanthalasma, EOMS intact, no nystagmus        Lymph:      ENT:  no pallor or cyanosis, dentition good        Neck:  carotid pulses are full and equal bilaterally, JVP normal, no carotid bruit        Respiratory:  normal breath sounds, clear to auscultation, normal A-P diameter, normal symmetry, normal " respiratory excursion, no use of accessory muscles         Cardiac: regular rhythm;normal S1 and S2   S4                                                right groin    GI:           Extremities and Muscular Skeletal:  no deformities, clubbing, cyanosis, erythema observed;no edema              Neurological:  no gross motor deficits        Psych:  Alert and Oriented x 3      Recent Lab Results:  LIPID RESULTS:  Lab Results   Component Value Date    CHOL 199 05/10/2018    HDL 60 05/10/2018     (H) 05/10/2018    TRIG 28 05/10/2018       LIVER ENZYME RESULTS:  No results found for: AST, ALT    CBC RESULTS:  Lab Results   Component Value Date    WBC 10.0 05/11/2018    RBC 4.58 05/11/2018    HGB 13.7 05/11/2018    HCT 39.6 (L) 05/11/2018    MCV 87 05/11/2018    MCH 29.9 05/11/2018    MCHC 34.6 05/11/2018    RDW 12.5 05/11/2018     05/11/2018       BMP RESULTS:  Lab Results   Component Value Date     05/25/2018    POTASSIUM 3.9 05/25/2018    CHLORIDE 101 05/25/2018    CO2 29 05/25/2018    ANIONGAP 10.9 05/25/2018    GLC 97 05/25/2018    BUN 14 05/25/2018    CR 1.40 (H) 05/25/2018    GFRESTIMATED 53 (L) 05/25/2018    GFRESTBLACK 64 05/25/2018    KELY 9.7 05/25/2018        A1C RESULTS:  No results found for: A1C    INR RESULTS:  No results found for: INR        Thank you for allowing me to participate in the care of your patient.    Sincerely,     ENRIKE Crocker [facility]

## 2018-05-25 NOTE — MR AVS SNAPSHOT
After Visit Summary   5/25/2018    Braulio Bunch    MRN: [id number]           Patient Information     Date Of Birth          1965        Visit Information        Provider Department      5/25/2018 10:50 AM Floresita Tyson APRN [facility]   [place]        Today's Diagnoses     ST elevation myocardial infarction (STEMI) involving other coronary artery of inferior wall (H)    -  1    Cardiac arrest with ventricular fibrillation (H)        Pure hypercholesterolemia          Care Instructions    Discontinue Coreg    Start Metoprolol 25mg daily in pm    See me back June 20th for follow up          Follow-ups after your visit        Additional Services     Follow-Up with Cardiac Advanced Practice Provider                 Your next 10 appointments already scheduled     May 25, 2018  3:00 PM CDT   Cardiac Treatment with  Cardiac Rehab 1   [facility] ([facility])    [address]  [place] [address]   [phone]            May 30, 2018  1:00 PM CDT   Cardiac Treatment with  Cardiac Rehab 1   [facility] ([facility])    [address]  [facility] [address]   [phone]            Jun 01, 2018  3:00 PM CDT   Cardiac Treatment with  Cardiac Rehab 1   [facility] ([facility])    [address]  [place] [address]   [phone]            Jun 04, 2018 10:00 AM CDT   Cardiac Treatment with  Cardiac Rehab 1   [facility] ([facility])    [address]  [facility] [address]   [phone]            Jun 06, 2018 10:00 AM CDT   Cardiac Treatment with  Cardiac Rehab 1   [facility] ([facility])    [address]  [facility] [address]   [phone]            Jun 08, 2018  9:30 AM CDT   CONSULT  with Sh Cardiac Rehab 1   [facility] ([facility])    [address]  [place] [address]   [phone]            Jun 08, 2018 10:00 AM CDT   Cardiac Treatment with Sh Cardiac Rehab 1   [facility] ([facility])    [address]  [place] [address]   [phone]            Jun 11, 2018  1:00 PM CDT   Cardiac Treatment with Sh Cardiac Rehab 1   [facility] ([facility])    [address]  [place] [address]   [phone]            Jun 13, 2018  1:00 PM CDT   Cardiac Treatment with Sh Cardiac Rehab 1   [facility] ([facility])    [address]  [place] [address]   [phone]            Ronald 15, 2018 10:00 AM CDT   Cardiac Treatment with Sh Cardiac Rehab 1   [facility] ([facility])    [address]  [place] [address]   [phone]              Future tests that were ordered for you today     Open Future Orders        Priority Expected Expires Ordered    Follow-Up with Cardiac Advanced Practice Provider Routine 6/20/2018 5/25/2019 5/25/2018            Who to contact     If you have questions or need follow up information about today's clinic visit or your schedule please contact [facility]   JAYCE directly at [phone].  Normal or non-critical lab and imaging results will be communicated to you by MyChart, letter or phone within 4 business days after the clinic has received the results. If you do not hear from us within 7 days, please contact the clinic through MyChart or phone. If you have a critical or abnormal lab result, we will notify you by phone as soon as possible.  Submit refill requests through [email or web address] or call your pharmacy and they will forward the refill request to us. Please allow 3 business  "days for your refill to be completed.          Additional Information About Your Visit        MyChart Information     [employer] gives you secure access to your electronic health record. If you see a primary care provider, you can also send messages to your care team and make appointments. If you have questions, please call your primary care clinic.  If you do not have a primary care provider, please call [phone] and they will assist you.        Care EveryWhere ID     This is your Care EveryWhere ID. This could be used by other organizations to access your [facility] medical records  [id number]        Your Vitals Were     Pulse Height BMI (Body Mass Index)             62 1.854 m (6' 1\") 25.46 kg/m2          Blood Pressure from Last 3 Encounters:   05/25/18 108/70   05/13/18 111/68    Weight from Last 3 Encounters:   05/25/18 87.5 kg (193 lb)   05/13/18 90.2 kg (198 lb 14.4 oz)              We Performed the Following     Follow-Up with Cardiac Advanced Practice Provider          Today's Medication Changes          These changes are accurate as of 5/25/18 11:46 AM.  If you have any questions, ask your nurse or doctor.               Start taking these medicines.        Dose/Directions    metoprolol succinate 25 MG 24 hr tablet   Commonly known as:  TOPROL-XL   Used for:  ST elevation myocardial infarction (STEMI) involving other coronary artery of inferior wall (H)   Started by:  Floresita Tyson APRN CNP        Dose:  25 mg   Take 1 tablet (25 mg) by mouth At Bedtime   Quantity:  90 tablet   Refills:  3         Stop taking these medicines if you haven't already. Please contact your care team if you have questions.     carvedilol 3.125 MG tablet   Commonly known as:  COREG   Stopped by:  Floresita Tyson APRN CNP                Where to get your medicines      These medications were sent to [id number] - [place], [place] - [address]  [address] [facility] [place] [address] "     Phone:  [phone]     metoprolol succinate 25 MG 24 hr tablet    nitroGLYcerin 0.4 MG sublingual tablet    ranolazine 500 MG 12 hr tablet    rosuvastatin 20 MG tablet    ticagrelor 90 MG tablet                Primary Care Provider Office Phone # Fax #    Josué Vaughn [phone]       [facility] [address]  [facility] [address]        Equal Access to Services     INDIO MEMBRENO : Hadii aad ku hadasho Soomaali, waaxda luqadaha, qaybta kaalmada adeegyada, waxay idiin hayaan adeeg kharash la'aan . So [facility] [phone].    ATENCIÓN: Si habla español, tiene a jean baptiste disposición servicios gratuitos de asistencia lingüística. Llame al [phone].    We comply with applicable federal civil rights laws and [place] laws. We do not discriminate on the basis of race, color, national origin, age, disability, sex, sexual orientation, or gender identity.            Thank you!     Thank you for choosing [facility]  for your care. Our goal is always to provide you with excellent care. Hearing back from our patients is one way we can continue to improve our services. Please take a few minutes to complete the written survey that you may receive in the mail after your visit with us. Thank you!             Your Updated Medication List - Protect others around you: Learn how to safely use, store and throw away your medicines at www.disposemymeds.org.          This list is accurate as of 5/25/18 11:46 AM.  Always use your most recent med list.                   Brand Name Dispense Instructions for use Diagnosis    aspirin 81 MG EC tablet     30 tablet    Take 1 tablet (81 mg) by mouth daily    ST elevation myocardial infarction (STEMI) involving other coronary artery of inferior wall (H)       metoprolol succinate 25 MG 24 hr tablet    TOPROL-XL    90 tablet    Take 1 tablet (25 mg) by mouth At Bedtime    ST elevation myocardial infarction (STEMI) involving  other coronary artery of inferior wall (H)       nitroGLYcerin 0.4 MG sublingual tablet    NITROSTAT    25 tablet    For chest pain place 1 tablet under the tongue every 5 minutes for 3 doses. If symptoms persist 5 minutes after 1st dose call 911.    ST elevation myocardial infarction (STEMI) involving other coronary artery of inferior wall (H)       ranolazine 500 MG 12 hr tablet    RANEXA    180 tablet    Take 1 tablet (500 mg) by mouth 2 times daily    ST elevation myocardial infarction (STEMI) involving other coronary artery of inferior wall (H), Cardiac arrest with ventricular fibrillation (H)       rosuvastatin 20 MG tablet    CRESTOR    90 tablet    Take 1 tablet (20 mg) by mouth daily    ST elevation myocardial infarction (STEMI) involving other coronary artery of inferior wall (H)       ticagrelor 90 MG tablet    BRILINTA    180 tablet    Take 1 tablet (90 mg) by mouth every 12 hours    ST elevation myocardial infarction (STEMI) involving other coronary artery of inferior wall (H)       VITAMIN D (CHOLECALCIFEROL) PO      Take 3,000 Units by mouth daily

## 2018-05-30 ENCOUNTER — HOSPITAL ENCOUNTER (OUTPATIENT)
Dept: CARDIAC REHAB | Facility: CLINIC | Age: 53
End: 2018-05-30
Attending: INTERNAL MEDICINE
Payer: COMMERCIAL

## 2018-05-30 PROCEDURE — 40000116 ZZH STATISTIC OP CR VISIT

## 2018-05-30 PROCEDURE — 93798 PHYS/QHP OP CAR RHAB W/ECG: CPT

## 2018-05-30 PROCEDURE — 40000575 ZZH STATISTIC OP CARDIAC VISIT #2

## 2018-05-30 PROCEDURE — 93797 PHYS/QHP OP CAR RHAB WO ECG: CPT | Mod: XU

## 2018-06-01 ENCOUNTER — HOSPITAL ENCOUNTER (OUTPATIENT)
Dept: CARDIAC REHAB | Facility: CLINIC | Age: 53
End: 2018-06-01
Attending: INTERNAL MEDICINE
Payer: COMMERCIAL

## 2018-06-01 PROCEDURE — 93798 PHYS/QHP OP CAR RHAB W/ECG: CPT | Performed by: REHABILITATION PRACTITIONER

## 2018-06-01 PROCEDURE — 40000116 ZZH STATISTIC OP CR VISIT: Performed by: REHABILITATION PRACTITIONER

## 2018-06-04 ENCOUNTER — HOSPITAL ENCOUNTER (OUTPATIENT)
Dept: CARDIAC REHAB | Facility: CLINIC | Age: 53
End: 2018-06-04
Attending: INTERNAL MEDICINE
Payer: COMMERCIAL

## 2018-06-04 PROCEDURE — 40000116 ZZH STATISTIC OP CR VISIT

## 2018-06-04 PROCEDURE — 93798 PHYS/QHP OP CAR RHAB W/ECG: CPT

## 2018-06-06 ENCOUNTER — HOSPITAL ENCOUNTER (OUTPATIENT)
Dept: CARDIAC REHAB | Facility: CLINIC | Age: 53
End: 2018-06-06
Attending: INTERNAL MEDICINE
Payer: COMMERCIAL

## 2018-06-06 PROCEDURE — 40000116 ZZH STATISTIC OP CR VISIT: Performed by: OCCUPATIONAL THERAPIST

## 2018-06-06 PROCEDURE — 93798 PHYS/QHP OP CAR RHAB W/ECG: CPT | Performed by: OCCUPATIONAL THERAPIST

## 2018-06-08 ENCOUNTER — CARE COORDINATION (OUTPATIENT)
Dept: CARDIOLOGY | Facility: CLINIC | Age: 53
End: 2018-06-08

## 2018-06-08 ENCOUNTER — HOSPITAL ENCOUNTER (OUTPATIENT)
Dept: CARDIAC REHAB | Facility: CLINIC | Age: 53
End: 2018-06-08
Attending: INTERNAL MEDICINE
Payer: COMMERCIAL

## 2018-06-08 DIAGNOSIS — I25.10 CAD (CORONARY ARTERY DISEASE): Primary | ICD-10-CM

## 2018-06-08 DIAGNOSIS — I21.19 ST ELEVATION MYOCARDIAL INFARCTION (STEMI) INVOLVING OTHER CORONARY ARTERY OF INFERIOR WALL (H): ICD-10-CM

## 2018-06-08 LAB
ANION GAP SERPL CALCULATED.3IONS-SCNC: 12.3 MMOL/L (ref 6–17)
BUN SERPL-MCNC: 12 MG/DL (ref 7–30)
CALCIUM SERPL-MCNC: 9.5 MG/DL (ref 8.5–10.5)
CHLORIDE SERPL-SCNC: 102 MMOL/L (ref 98–107)
CO2 SERPL-SCNC: 28 MMOL/L (ref 23–29)
CREAT SERPL-MCNC: 1.32 MG/DL (ref 0.7–1.3)
GFR SERPL CREATININE-BSD FRML MDRD: 57 ML/MIN/1.7M2
GLUCOSE SERPL-MCNC: 86 MG/DL (ref 70–105)
POTASSIUM SERPL-SCNC: 4.3 MMOL/L (ref 3.5–5.1)
SODIUM SERPL-SCNC: 138 MMOL/L (ref 136–145)

## 2018-06-08 PROCEDURE — 93798 PHYS/QHP OP CAR RHAB W/ECG: CPT | Performed by: CLINICAL EXERCISE PHYSIOLOGIST

## 2018-06-08 PROCEDURE — 40000575 ZZH STATISTIC OP CARDIAC VISIT #2: Performed by: CLINICAL EXERCISE PHYSIOLOGIST

## 2018-06-08 PROCEDURE — 40000116 ZZH STATISTIC OP CR VISIT: Performed by: CLINICAL EXERCISE PHYSIOLOGIST

## 2018-06-08 PROCEDURE — 93797 PHYS/QHP OP CAR RHAB WO ECG: CPT | Mod: XU | Performed by: CLINICAL EXERCISE PHYSIOLOGIST

## 2018-06-08 PROCEDURE — 80048 BASIC METABOLIC PNL TOTAL CA: CPT | Performed by: NURSE PRACTITIONER

## 2018-06-08 PROCEDURE — 36415 COLL VENOUS BLD VENIPUNCTURE: CPT | Performed by: NURSE PRACTITIONER

## 2018-06-08 NOTE — PROGRESS NOTES
Called and spoke with pt.  Reviewed DTK's recommendations above.  He states he has never takend lasix.  Reviewed with DTK who states she meant to say lisinopril which was stopped 5/19/18.  Reviewed with pt to stay off lisinopril and stay hydrated.  He verbalized understanding and agrees with this plan.  Also reviewed need for BMP prior to OV on 6/20/18- he agrees to lap appt on 6/20 at 8:10am.    GREGORIO Childs 3:41 PM 6/8/2018

## 2018-06-11 ENCOUNTER — HOSPITAL ENCOUNTER (OUTPATIENT)
Dept: CARDIAC REHAB | Facility: CLINIC | Age: 53
End: 2018-06-11
Attending: INTERNAL MEDICINE
Payer: COMMERCIAL

## 2018-06-11 PROCEDURE — 93798 PHYS/QHP OP CAR RHAB W/ECG: CPT | Performed by: CLINICAL EXERCISE PHYSIOLOGIST

## 2018-06-11 PROCEDURE — 40000116 ZZH STATISTIC OP CR VISIT: Performed by: CLINICAL EXERCISE PHYSIOLOGIST

## 2018-06-13 ENCOUNTER — HOSPITAL ENCOUNTER (OUTPATIENT)
Dept: CARDIAC REHAB | Facility: CLINIC | Age: 53
End: 2018-06-13
Attending: INTERNAL MEDICINE
Payer: COMMERCIAL

## 2018-06-13 PROCEDURE — 93798 PHYS/QHP OP CAR RHAB W/ECG: CPT

## 2018-06-13 PROCEDURE — 40000116 ZZH STATISTIC OP CR VISIT

## 2018-06-15 ENCOUNTER — HOSPITAL ENCOUNTER (OUTPATIENT)
Dept: CARDIAC REHAB | Facility: CLINIC | Age: 53
End: 2018-06-15
Attending: INTERNAL MEDICINE
Payer: COMMERCIAL

## 2018-06-15 PROCEDURE — 93798 PHYS/QHP OP CAR RHAB W/ECG: CPT | Performed by: REHABILITATION PRACTITIONER

## 2018-06-15 PROCEDURE — 40000116 ZZH STATISTIC OP CR VISIT: Performed by: REHABILITATION PRACTITIONER

## 2018-06-18 ENCOUNTER — HOSPITAL ENCOUNTER (OUTPATIENT)
Dept: CARDIAC REHAB | Facility: CLINIC | Age: 53
End: 2018-06-18
Attending: INTERNAL MEDICINE
Payer: COMMERCIAL

## 2018-06-18 PROCEDURE — 40000116 ZZH STATISTIC OP CR VISIT: Performed by: OCCUPATIONAL THERAPIST

## 2018-06-18 PROCEDURE — 93798 PHYS/QHP OP CAR RHAB W/ECG: CPT | Performed by: OCCUPATIONAL THERAPIST

## 2018-06-20 ENCOUNTER — OFFICE VISIT (OUTPATIENT)
Dept: CARDIOLOGY | Facility: CLINIC | Age: 53
End: 2018-06-20
Attending: NURSE PRACTITIONER
Payer: COMMERCIAL

## 2018-06-20 ENCOUNTER — HOSPITAL ENCOUNTER (OUTPATIENT)
Dept: CARDIAC REHAB | Facility: CLINIC | Age: 53
End: 2018-06-20
Attending: INTERNAL MEDICINE
Payer: COMMERCIAL

## 2018-06-20 VITALS
WEIGHT: 188 LBS | SYSTOLIC BLOOD PRESSURE: 105 MMHG | BODY MASS INDEX: 24.92 KG/M2 | HEIGHT: 73 IN | HEART RATE: 76 BPM | DIASTOLIC BLOOD PRESSURE: 70 MMHG

## 2018-06-20 DIAGNOSIS — I21.19 ST ELEVATION MYOCARDIAL INFARCTION (STEMI) INVOLVING OTHER CORONARY ARTERY OF INFERIOR WALL (H): ICD-10-CM

## 2018-06-20 DIAGNOSIS — I25.10 CAD (CORONARY ARTERY DISEASE): ICD-10-CM

## 2018-06-20 LAB
ALT SERPL W P-5'-P-CCNC: <5 U/L (ref 5–30)
ANION GAP SERPL CALCULATED.3IONS-SCNC: 12.7 MMOL/L (ref 6–17)
BUN SERPL-MCNC: 15 MG/DL (ref 7–30)
CALCIUM SERPL-MCNC: 10 MG/DL (ref 8.5–10.5)
CHLORIDE SERPL-SCNC: 102 MMOL/L (ref 98–107)
CO2 SERPL-SCNC: 30 MMOL/L (ref 23–29)
CREAT SERPL-MCNC: 1.27 MG/DL (ref 0.7–1.3)
GFR SERPL CREATININE-BSD FRML MDRD: 60 ML/MIN/1.7M2
GLUCOSE SERPL-MCNC: 103 MG/DL (ref 70–105)
POTASSIUM SERPL-SCNC: 3.7 MMOL/L (ref 3.5–5.1)
SODIUM SERPL-SCNC: 141 MMOL/L (ref 136–145)

## 2018-06-20 PROCEDURE — 93000 ELECTROCARDIOGRAM COMPLETE: CPT | Performed by: NURSE PRACTITIONER

## 2018-06-20 PROCEDURE — 93798 PHYS/QHP OP CAR RHAB W/ECG: CPT | Performed by: CLINICAL EXERCISE PHYSIOLOGIST

## 2018-06-20 PROCEDURE — 36415 COLL VENOUS BLD VENIPUNCTURE: CPT | Performed by: NURSE PRACTITIONER

## 2018-06-20 PROCEDURE — 80048 BASIC METABOLIC PNL TOTAL CA: CPT | Performed by: NURSE PRACTITIONER

## 2018-06-20 PROCEDURE — 40000116 ZZH STATISTIC OP CR VISIT: Performed by: CLINICAL EXERCISE PHYSIOLOGIST

## 2018-06-20 PROCEDURE — 84460 ALANINE AMINO (ALT) (SGPT): CPT | Performed by: NURSE PRACTITIONER

## 2018-06-20 PROCEDURE — 99215 OFFICE O/P EST HI 40 MIN: CPT | Performed by: NURSE PRACTITIONER

## 2018-06-20 NOTE — PATIENT INSTRUCTIONS
Schedule a stress echocardiogram    I will call you with those results and if normal, I will stop Ranexa    If abnormal, I we will consider angiogram

## 2018-06-20 NOTE — MR AVS SNAPSHOT
After Visit Summary   6/20/2018    Braulio Bunch    MRN: [id number]           Patient Information     Date Of Birth          1965        Visit Information        Provider Department      6/20/2018 8:50 AM Floresita Tyson APRN [facility]   [place]        Today's Diagnoses     ST elevation myocardial infarction (STEMI) involving other coronary artery of inferior wall (H)          Care Instructions    Schedule a stress echocardiogram    I will call you with those results and if normal, I will stop Ranexa    If abnormal, I we will consider angiogram              Follow-ups after your visit        Your next 10 appointments already scheduled     Jun 20, 2018 10:00 AM CDT   Cardiac Treatment with Sh Cardiac Rehab 1   [facility] ([facility])    [address]  [facility] [address]   [phone]            Jun 22, 2018 10:00 AM CDT   Cardiac Treatment with Sh Cardiac Rehab 1   [facility])    [address]  [facility] [address]   [phone]            Jun 25, 2018 10:00 AM CDT   Cardiac Treatment with Sh Cardiac Rehab 1   [facility] ([facility])    [address]  [facility] [address]   [phone]            Jun 27, 2018 10:00 AM CDT   Cardiac Treatment with Sh Cardiac Rehab 1   [facility] ([facility])    [address]  [facility] [address]   [phone]            Jun 29, 2018 10:00 AM CDT   Cardiac Treatment with Sh Cardiac Rehab 1   [facility] ([facility])    [address]  [facility] [address]   [phone]            Jul 02, 2018 10:00 AM CDT   Cardiac Treatment with Sh Cardiac Rehab 1   [facility] ([facility])    [address]  [place] [address]   [phone]            Jul 11, 2018 10:00 AM CDT   Cardiac Treatment with Sh Cardiac Rehab 1   [facility] ([facility])    [address]  [place] [address]   [phone]            Jul 13, 2018 10:00 AM CDT   Cardiac Treatment with Sh Cardiac Rehab 1   [facility] ([facility])    [address]  [place] [address]   [phone]            Jul 16, 2018 10:00 AM CDT   Cardiac Treatment with Sh Cardiac Rehab 1   [facility] ([facility])    [address]  [place] [address]   [phone]            Jul 18, 2018 10:00 AM CDT   Cardiac Treatment with Sh Cardiac Rehab 1   [facility] ([facility])    [address]  [place] [address]   [phone]              Future tests that were ordered for you today     Open Future Orders        Priority Expected Expires Ordered    EKG 12-lead complete w/read - Clinics Routine 6/20/2018 6/27/2018 6/20/2018    Exercise Stress Echocardiogram Routine 6/27/2018 6/20/2019 6/20/2018            Who to contact     If you have questions or need follow up information about today's clinic visit or your schedule please contact [facility] directly at [phone].  Normal or non-critical lab and imaging results will be communicated to you by MyChart, letter or phone within 4 business days after the clinic has received the results. If you do not hear from us within 7 days, please contact the clinic through MyChart or phone. If you have a critical or abnormal lab result, we will notify you by phone as soon as possible.  Submit refill requests through [employer] or call your pharmacy and they will forward the refill request to us. Please allow 3 business days for your refill to be completed.           "Additional Information About Your Visit        MyChart Information     [employer] gives you secure access to your electronic health record. If you see a primary care provider, you can also send messages to your care team and make appointments. If you have questions, please call your primary care clinic.  If you do not have a primary care provider, please call [phone] and they will assist you.        Care EveryWhere ID     This is your Care EveryWhere ID. This could be used by other organizations to access your [place] medical records  [id number]        Your Vitals Were     Pulse Height BMI (Body Mass Index)             76 1.854 m (6' 1\") 24.8 kg/m2          Blood Pressure from Last 3 Encounters:   06/20/18 105/70   05/25/18 108/70   05/13/18 111/68    Weight from Last 3 Encounters:   06/20/18 85.3 kg (188 lb)   05/25/18 87.5 kg (193 lb)   05/13/18 90.2 kg (198 lb 14.4 oz)              We Performed the Following     EKG 12-lead complete w/read - Clinics (performed today)     Follow-Up with Cardiac Advanced Practice Provider        Primary Care Provider Office Phone # Fax #    Josué Vaughn [phone]       [facility] [address] [facility] [address]        Equal Access to Services     JIMENA MEMBRENO AH: Hadii aad ku hadasho Soomaali, waaxda luqadaha, qaybta kaalmada adeegyada, waxay idiin hayconcepciónn konstantin mahoney . So [facility] [phone].    ATENCIÓN: Si habla español, tiene a jean baptiste disposición servicios gratuitos de asistencia lingüística. Llame al [phone].    We comply with applicable federal civil rights laws and [place] laws. We do not discriminate on the basis of race, color, national origin, age, disability, sex, sexual orientation, or gender identity.            Thank you!     Thank you for choosing [facility]  for your care. Our goal is always to provide you with excellent care. Hearing back from our patients is one way we can " continue to improve our services. Please take a few minutes to complete the written survey that you may receive in the mail after your visit with us. Thank you!             Your Updated Medication List - Protect others around you: Learn how to safely use, store and throw away your medicines at www.disposemymeds.org.          This list is accurate as of 6/20/18  9:38 AM.  Always use your most recent med list.                   Brand Name Dispense Instructions for use Diagnosis    aspirin 81 MG EC tablet     30 tablet    Take 1 tablet (81 mg) by mouth daily    ST elevation myocardial infarction (STEMI) involving other coronary artery of inferior wall (H)       metoprolol succinate 25 MG 24 hr tablet    TOPROL-XL    90 tablet    Take 1 tablet (25 mg) by mouth At Bedtime    ST elevation myocardial infarction (STEMI) involving other coronary artery of inferior wall (H)       nitroGLYcerin 0.4 MG sublingual tablet    NITROSTAT    25 tablet    For chest pain place 1 tablet under the tongue every 5 minutes for 3 doses. If symptoms persist 5 minutes after 1st dose call 911.    ST elevation myocardial infarction (STEMI) involving other coronary artery of inferior wall (H)       ranolazine 500 MG 12 hr tablet    RANEXA    180 tablet    Take 1 tablet (500 mg) by mouth 2 times daily    ST elevation myocardial infarction (STEMI) involving other coronary artery of inferior wall (H), Cardiac arrest with ventricular fibrillation (H)       rosuvastatin 20 MG tablet    CRESTOR    90 tablet    Take 1 tablet (20 mg) by mouth daily    ST elevation myocardial infarction (STEMI) involving other coronary artery of inferior wall (H)       ticagrelor 90 MG tablet    BRILINTA    180 tablet    Take 1 tablet (90 mg) by mouth every 12 hours    ST elevation myocardial infarction (STEMI) involving other coronary artery of inferior wall (H)       VITAMIN D (CHOLECALCIFEROL) PO      Take 3,000 Units by mouth daily Pt start taking 2000 unit daily.

## 2018-06-20 NOTE — PROGRESS NOTES
"History of Present Illness:     Braulio Bunch is a 53 year old male who recently met Dr. Carter when he presented to the hospital May 10th with a STEMI.    Who presents today for his second post hospital follow-up.  We changed his Coreg to metoprolol at the last office visit for \"brain fog\".     This patient has a history of dyslipidemia and had been taken off simvastatin for 4 years.  He reestablish care at his primary clinic and had a lipid profile done which was elevated.  He was told to embark on an exercise plan.  The following morning went to his basement and attempted to do the [employer] aerobic workout videotape.  During the warmup he began to have acute substernal chest discomfort.  He was taken to [facility] by ambulance.  He was found to be having an inferior wall MI with ST segment elevation.  On the way to the Cath Lab he had a ventricular fibrillation arrest.  He was shocked back to sinus rhythm but continued to have episodes of torsades V. tach treated with IV beta-blocker and rapid angiogram with angioplasty.  Troponin peaked at 103. CPR was given with chest compressions.     On angiogram his LAD had 30% stenosis.  The circumflex had 100% occlusion of the ostial OM 2 branch.  The right coronary had diffuse disease with mild luminal irregularities.  The OM 2 was treated with a 4.0 x 16 mm Synergy drug-eluting stent deployed across the mid circumflex into the ostium of the OM 2 with jailing of the OM1.  This was postdilated with reestablishment of flow. OCT was performed noting the diameter of the mid circumflex to be 4.01 mm and ostial OM to 3.85 mm.     Postprocedure the patient had some atypical chest discomfort.  He was tried on isosorbide but had low blood pressures.  He was switched to Ranexa 500 mg twice daily and tried on a few doses of colchicine for assumed pericarditis.  He had no further arrhythmias throughout his hospital course on cardiac rehab.  He continues to " "complain of discomfort along the left costochondral margin.  He states if he leans on his left elbow the pain is worse if he turns to his right side it gets better.  He has no tenderness there but points to a very localized spot.  I suspect he could have irritation ongoing from his CPR.  He denies that these symptoms are anything like what he presented with during his heart attack. He is exercising in cardiac rehab to heart rates of 120-140 with no chest discomfort.  He and his wife are very concerned about this discomfort and I will therefore order a stress echocardiogram on beta-blocker since he is able to generate heart rates that are sufficient on that medication.     Follow-up echocardiogram revealed a left ventricular function at 50-55% with severe inferolateral wall hypokinesis.  No significant valvular disease.     He was started on Crestor therapy.His HDL in the hospital was 60  triglyceride 28.     He said issues ongoing with lightheadedness and \"brain fog\".  I had stopped carvedilol and switch to metoprolol last visit and he feels this is gradually improving.  He had symptomatic lightheadedness on lisinopril with an elevated creatinine after his dye load and ACE inhibitors were added.  This was also discontinued and his creatinine is down to 1.2 today with a baseline of 1.13, peaked at 1.4.       I note what looks to be a prolonged QRS on the telemetry tracings in cardiac rehab, however I am not sure what lead there using.  Since he is on Ranexa a 12-lead EKG was repeated today and QRS duration is 108 ms with a QTC of 401 ms.  In the hospital QRS duration was  ms with a QTC of 432.      He has a maternal grand father who had sudden death at age 61 assumed to be an MI.  He has multiple aunts and uncles on his mother's side that also have premature coronary disease.          He is taking aspirin and Brilinta as prescribed.    He has a history of migraine headaches and historically takes " Excedrin.  It would likely be safe for him to sparingly use Excedrin tension which does not have any aspirin component, just caffeine and Tylenol.     Exam today reveals a nontender chest.  Lungs are clear.  He has a soft S4 gallop.  Distal pulses are 1+.     He is a  and is taking 2 months off work to recover.  Disability paperwork was completed today.  He hopes to return 4 hours a day between July 11 and July 23, starting full days July 24 with the exception of July 30 when he has a follow-up appointment with Dr. Carter.        Impression/Plan:      1.  STEMI  -Peak troponin I 03  -Occluded OM 2 at the ostium treated with a Synergy drug-eluting stent into the circumflex/OM to ostium with jailing of the OM1 which was ballooned.  No other flow limiting disease noted  - atypical chest discomfort since being home.  He denies any symptoms reminiscent of his heart attack symptoms  -Remain off of lisinopril as LV function is relatively well preserved  -Schedule a stress echocardiogram on beta-blocker; if this is unremarkable I will attempt to stop his Ranexa.  I will contact him with those results.  -Continue aspirin with Brilinta for 1 year (5-)     2.  V. fib arrest on the way to the Cath Lab with intermittent torsades after defibrillated  -No further arrhythmia since  -LV function 50-55%  -Continue beta-blockers as outlined above     3.  Dyslipidemia  -Continue Crestor  -recheck labs in July when he sees Dr. Carter in follow-up     4.  Vitamin D deficiency  -On replacement as ordered by primary care     5.  Mild acute kidney injury with elevated creatinine 1.4, now improved to 1.27 with a baseline of 1.13.  -Remain off Lisinopril  -Continue hydration     I will contact this patient with stress echo results.  We will plan to see him back for his follow-up with Dr. Carter unless his stress test is markedly abnormal.  Greater than 50% of this 45 minute visit today was spent in counseling and  collaboration-this plan was reviewed with Dr. Carter.  Floresita Tyson, MSN, APRN-BC, CNP  Cardiology    No orders of the defined types were placed in this encounter.    No orders of the defined types were placed in this encounter.    There are no discontinued medications.      Encounter Diagnosis   Name Primary?     ST elevation myocardial infarction (STEMI) involving other coronary artery of inferior wall (H)        CURRENT MEDICATIONS:  Current Outpatient Prescriptions   Medication Sig Dispense Refill     aspirin 81 MG EC tablet Take 1 tablet (81 mg) by mouth daily 30 tablet 11     metoprolol succinate (TOPROL-XL) 25 MG 24 hr tablet Take 1 tablet (25 mg) by mouth At Bedtime 90 tablet 3     nitroGLYcerin (NITROSTAT) 0.4 MG sublingual tablet For chest pain place 1 tablet under the tongue every 5 minutes for 3 doses. If symptoms persist 5 minutes after 1st dose call 911. 25 tablet 3     ranolazine (RANEXA) 500 MG 12 hr tablet Take 1 tablet (500 mg) by mouth 2 times daily 180 tablet 3     rosuvastatin (CRESTOR) 20 MG tablet Take 1 tablet (20 mg) by mouth daily 90 tablet 3     ticagrelor (BRILINTA) 90 MG tablet Take 1 tablet (90 mg) by mouth every 12 hours 180 tablet 3     VITAMIN D, CHOLECALCIFEROL, PO Take 3,000 Units by mouth daily Pt start taking 2000 unit daily.         ALLERGIES     Allergies   Allergen Reactions     Augmented Betamethasone Diprop [Betamethasone]        PAST MEDICAL HISTORY:  Past Medical History:   Diagnosis Date     Acute myocardial infarction 5/13/2018     CAD (coronary artery disease)     Cath 5/10/18- PCI with revascularization to OM- EVELYN placed; jailing of OM-1--dilated lad 30%; mild disease elsewhere     Hyperlipidemia      Kidney calculi      Migraine      STEMI (ST elevation myocardial infarction) (H) 5/10/2018     V-tach (H)     during hospitalization 5/2018     Ventricular fibrillation (H)     5/10/18 in ER     Vitamin D deficiency        PAST SURGICAL HISTORY:  Past Surgical  "History:   Procedure Laterality Date     PARATHYROIDECTOMY         FAMILY HISTORY:  History reviewed. No pertinent family history.    SOCIAL HISTORY:  Social History     Social History     Marital status:      Spouse name: N/A     Number of children: N/A     Years of education: N/A     Social History Main Topics     Smoking status: Never Smoker     Smokeless tobacco: Never Used     Alcohol use Yes      Comment: rare     Drug use: None     Sexual activity: Not Asked     Other Topics Concern     None     Social History Narrative       Review of Systems:  Skin:  Negative       Eyes:  Positive for glasses    ENT:  Negative      Respiratory:  Negative       Cardiovascular:    Positive for;lightheadedness;dizziness;palpitations;chest pain (occasional twinges) Spinning sensation; improved  Gastroenterology: Negative      Genitourinary:  Positive for  (Hx kidney stones)    Musculoskeletal:  Negative      Neurologic:  Positive for  (Hx migraines )    Psychiatric:  Negative      Heme/Lymph/Imm:  Negative      Endocrine:  Negative        Physical Exam:  Vitals: /70  Pulse 76  Ht 1.854 m (6' 1\")  Wt 85.3 kg (188 lb)  BMI 24.8 kg/m2    Constitutional:  cooperative, alert and oriented, well developed, well nourished, in no acute distress        Skin:  warm and dry to the touch, no apparent skin lesions or masses noted          Head:  normocephalic, no masses or lesions        Eyes:  pupils equal and round, conjunctivae and lids unremarkable, sclera white, no xanthalasma, EOMS intact, no nystagmus        Lymph:      ENT:  no pallor or cyanosis, dentition good        Neck:  carotid pulses are full and equal bilaterally, JVP normal, no carotid bruit        Respiratory:  normal breath sounds, clear to auscultation, normal A-P diameter, normal symmetry, normal respiratory excursion, no use of accessory muscles         Cardiac: regular rhythm;normal S1 and S2   S4                                                     GI: "           Extremities and Muscular Skeletal:  no deformities, clubbing, cyanosis, erythema observed;no edema              Neurological:  no gross motor deficits        Psych:  Alert and Oriented x 3      Recent Lab Results:  LIPID RESULTS:  Lab Results   Component Value Date    CHOL 199 05/10/2018    HDL 60 05/10/2018     (H) 05/10/2018    TRIG 28 05/10/2018       LIVER ENZYME RESULTS:  Lab Results   Component Value Date    ALT <5 (L) 06/20/2018       CBC RESULTS:  Lab Results   Component Value Date    WBC 10.0 05/11/2018    RBC 4.58 05/11/2018    HGB 13.7 05/11/2018    HCT 39.6 (L) 05/11/2018    MCV 87 05/11/2018    MCH 29.9 05/11/2018    MCHC 34.6 05/11/2018    RDW 12.5 05/11/2018     05/11/2018       BMP RESULTS:  Lab Results   Component Value Date     06/20/2018    POTASSIUM 3.7 06/20/2018    CHLORIDE 102 06/20/2018    CO2 30 (H) 06/20/2018    ANIONGAP 12.7 06/20/2018     06/20/2018    BUN 15 06/20/2018    CR 1.27 06/20/2018    GFRESTIMATED 60 (L) 06/20/2018    GFRESTBLACK 72 06/20/2018    KELY 10.0 06/20/2018        A1C RESULTS:  No results found for: A1C    INR RESULTS:  No results found for: INR        CC  ENRIKE Crocker CNP  [address]  [place] [address]

## 2018-06-20 NOTE — LETTER
"6/20/2018    Josué Vaughn MD  [facility] [address]  [facility] [address]    RE: Braulio Bunch       Dear Colleague,    I had the pleasure of seeing Braulio Bunch in the [facility].    History of Present Illness:     Braulio Bunch is a 53 year old male who recently met Dr. Carter when he presented to the hospital May 10th with a STEMI.     Who presents today for his second post hospital follow-up.  We changed his Coreg to metoprolol at the last office visit for \"brain fog\".     This patient has a history of dyslipidemia and had been taken off simvastatin for 4 years.  He reestablish care at his primary clinic and had a lipid profile done which was elevated.  He was told to embark on an exercise plan.  The following morning went to his basement and attempted to do the insanity aerobic workout videotape.  During the warmup he began to have acute substernal chest discomfort.  He was taken to [facility] by ambulance.  He was found to be having an inferior wall MI with ST segment elevation.  On the way to the Cath Lab he had a ventricular fibrillation arrest.  He was shocked back to sinus rhythm but continued to have episodes of torsades V. tach treated with IV beta-blocker and rapid angiogram with angioplasty.  Troponin peaked at 103. CPR was given with chest compressions.     On angiogram his LAD had 30% stenosis.  The circumflex had 100% occlusion of the ostial OM 2 branch.  The right coronary had diffuse disease with mild luminal irregularities.  The OM 2 was treated with a 4.0 x 16 mm Synergy drug-eluting stent deployed across the mid circumflex into the ostium of the OM 2 with jailing of the OM1.  This was postdilated with reestablishment of flow. OCT was performed noting the diameter of the mid circumflex to be 4.01 mm and ostial OM to 3.85 mm.     Postprocedure the patient had some atypical chest discomfort.  He was tried on isosorbide but " "had low blood pressures.  He was switched to Ranexa 500 mg twice daily and tried on a few doses of colchicine for assumed pericarditis.  He had no further arrhythmias throughout his hospital course on cardiac rehab.  He continues to complain of discomfort along the left costochondral margin.  He states if he leans on his left elbow the pain is worse if he turns to his right side it gets better.  He has no tenderness there but points to a very localized spot.  I suspect he could have irritation ongoing from his CPR.  He denies that these symptoms are anything like what he presented with during his heart attack. He is exercising in cardiac rehab to heart rates of 120-140 with no chest discomfort.  He and his wife are very concerned about this discomfort and I will therefore order a stress echocardiogram on beta-blocker since he is able to generate heart rates that are sufficient on that medication.     Follow-up echocardiogram revealed a left ventricular function at 50-55% with severe inferolateral wall hypokinesis.  No significant valvular disease.     He was started on Crestor therapy.His HDL in the hospital was 60  triglyceride 28.     He said issues ongoing with lightheadedness and \"brain fog\".  I had stopped carvedilol and switch to metoprolol last visit and he feels this is gradually improving.  He had symptomatic lightheadedness on lisinopril with an elevated creatinine after his dye load and ACE inhibitors were added.  This was also discontinued and his creatinine is down to 1.2 today with a baseline of 1.13, peaked at 1.4.       I note what looks to be a prolonged QRS on the telemetry tracings in cardiac rehab, however I am not sure what lead there using.  Since he is on Ranexa a 12-lead EKG was repeated today and QRS duration is 108 ms with a QTC of 401 ms.  In the hospital QRS duration was  ms with a QTC of 432.      He has a maternal grand father who had sudden death at age 61 assumed to be an " MI.  He has multiple aunts and uncles on his mother's side that also have premature coronary disease.          He is taking aspirin and Brilinta as prescribed.    He has a history of migraine headaches and historically takes Excedrin.  It would likely be safe for him to sparingly use Excedrin tension which does not have any aspirin component, just caffeine and Tylenol.     Exam today reveals a nontender chest.  Lungs are clear.  He has a soft S4 gallop.  Distal pulses are 1+.     He is a  and is taking 2 months off work to recover.  Disability paperwork was completed today.  He hopes to return 4 hours a day between July 11 and July 23, starting full days July 24 with the exception of July 30 when he has a follow-up appointment with Dr. Carter.        Impression/Plan:      1.  STEMI  -Peak troponin I 03  -Occluded OM 2 at the ostium treated with a Synergy drug-eluting stent into the circumflex/OM to ostium with jailing of the OM1 which was ballooned.  No other flow limiting disease noted  - atypical chest discomfort since being home.  He denies any symptoms reminiscent of his heart attack symptoms  -Remain off of lisinopril as LV function is relatively well preserved  -Schedule a stress echocardiogram on beta-blocker; if this is unremarkable I will attempt to stop his Ranexa.  I will contact him with those results.  -Continue aspirin with Brilinta for 1 year (5-)     2.  V. fib arrest on the way to the Cath Lab with intermittent torsades after defibrillated  -No further arrhythmia since  -LV function 50-55%  -Continue beta-blockers as outlined above     3.  Dyslipidemia  -Continue Crestor  -recheck labs in July when he sees Dr. Carter in follow-up     4.  Vitamin D deficiency  -On replacement as ordered by primary care     5.  Mild acute kidney injury with elevated creatinine 1.4, now improved to 1.27 with a baseline of 1.13.  -Remain off Lisinopril  -Continue hydration     I will contact this  patient with stress echo results.  We will plan to see him back for his follow-up with Dr. Carter unless his stress test is markedly abnormal.  Greater than 50% of this 45 minute visit today was spent in counseling and collaboration-this plan was reviewed with Dr. Carter.  Floresita Tyson, MSN, APRN-BC, CNP  Cardiology    No orders of the defined types were placed in this encounter.    No orders of the defined types were placed in this encounter.    There are no discontinued medications.      Encounter Diagnosis   Name Primary?     ST elevation myocardial infarction (STEMI) involving other coronary artery of inferior wall (H)        CURRENT MEDICATIONS:  Current Outpatient Prescriptions   Medication Sig Dispense Refill     aspirin 81 MG EC tablet Take 1 tablet (81 mg) by mouth daily 30 tablet 11     metoprolol succinate (TOPROL-XL) 25 MG 24 hr tablet Take 1 tablet (25 mg) by mouth At Bedtime 90 tablet 3     nitroGLYcerin (NITROSTAT) 0.4 MG sublingual tablet For chest pain place 1 tablet under the tongue every 5 minutes for 3 doses. If symptoms persist 5 minutes after 1st dose call 911. 25 tablet 3     ranolazine (RANEXA) 500 MG 12 hr tablet Take 1 tablet (500 mg) by mouth 2 times daily 180 tablet 3     rosuvastatin (CRESTOR) 20 MG tablet Take 1 tablet (20 mg) by mouth daily 90 tablet 3     ticagrelor (BRILINTA) 90 MG tablet Take 1 tablet (90 mg) by mouth every 12 hours 180 tablet 3     VITAMIN D, CHOLECALCIFEROL, PO Take 3,000 Units by mouth daily Pt start taking 2000 unit daily.         ALLERGIES     Allergies   Allergen Reactions     Augmented Betamethasone Diprop [Betamethasone]        PAST MEDICAL HISTORY:  Past Medical History:   Diagnosis Date     Acute myocardial infarction 5/13/2018     CAD (coronary artery disease)     Cath 5/10/18- PCI with revascularization to OM- EVELYN placed; jailing of OM-1--dilated lad 30%; mild disease elsewhere     Hyperlipidemia      Kidney calculi      Migraine      STEMI (ST  "elevation myocardial infarction) (H) 5/10/2018     V-tach (H)     during hospitalization 5/2018     Ventricular fibrillation (H)     5/10/18 in ER     Vitamin D deficiency        PAST SURGICAL HISTORY:  Past Surgical History:   Procedure Laterality Date     PARATHYROIDECTOMY         FAMILY HISTORY:  History reviewed. No pertinent family history.    SOCIAL HISTORY:  Social History     Social History     Marital status:      Spouse name: N/A     Number of children: N/A     Years of education: N/A     Social History Main Topics     Smoking status: Never Smoker     Smokeless tobacco: Never Used     Alcohol use Yes      Comment: rare     Drug use: None     Sexual activity: Not Asked     Other Topics Concern     None     Social History Narrative       Review of Systems:  Skin:  Negative       Eyes:  Positive for glasses    ENT:  Negative      Respiratory:  Negative       Cardiovascular:    Positive for;lightheadedness;dizziness;palpitations;chest pain (occasional twinges) Spinning sensation; improved  Gastroenterology: Negative      Genitourinary:  Positive for  (Hx kidney stones)    Musculoskeletal:  Negative      Neurologic:  Positive for  (Hx migraines )    Psychiatric:  Negative      Heme/Lymph/Imm:  Negative      Endocrine:  Negative        Physical Exam:  Vitals: /70  Pulse 76  Ht 1.854 m (6' 1\")  Wt 85.3 kg (188 lb)  BMI 24.8 kg/m2    Constitutional:  cooperative, alert and oriented, well developed, well nourished, in no acute distress        Skin:  warm and dry to the touch, no apparent skin lesions or masses noted          Head:  normocephalic, no masses or lesions        Eyes:  pupils equal and round, conjunctivae and lids unremarkable, sclera white, no xanthalasma, EOMS intact, no nystagmus        Lymph:      ENT:  no pallor or cyanosis, dentition good        Neck:  carotid pulses are full and equal bilaterally, JVP normal, no carotid bruit        Respiratory:  normal breath sounds, clear to " auscultation, normal A-P diameter, normal symmetry, normal respiratory excursion, no use of accessory muscles         Cardiac: regular rhythm;normal S1 and S2   S4                                                     GI:           Extremities and Muscular Skeletal:  no deformities, clubbing, cyanosis, erythema observed;no edema              Neurological:  no gross motor deficits        Psych:  Alert and Oriented x 3      Recent Lab Results:  LIPID RESULTS:  Lab Results   Component Value Date    CHOL 199 05/10/2018    HDL 60 05/10/2018     (H) 05/10/2018    TRIG 28 05/10/2018       LIVER ENZYME RESULTS:  Lab Results   Component Value Date    ALT <5 (L) 06/20/2018       CBC RESULTS:  Lab Results   Component Value Date    WBC 10.0 05/11/2018    RBC 4.58 05/11/2018    HGB 13.7 05/11/2018    HCT 39.6 (L) 05/11/2018    MCV 87 05/11/2018    MCH 29.9 05/11/2018    MCHC 34.6 05/11/2018    RDW 12.5 05/11/2018     05/11/2018       BMP RESULTS:  Lab Results   Component Value Date     06/20/2018    POTASSIUM 3.7 06/20/2018    CHLORIDE 102 06/20/2018    CO2 30 (H) 06/20/2018    ANIONGAP 12.7 06/20/2018     06/20/2018    BUN 15 06/20/2018    CR 1.27 06/20/2018    GFRESTIMATED 60 (L) 06/20/2018    GFRESTBLACK 72 06/20/2018    KELY 10.0 06/20/2018        A1C RESULTS:  No results found for: A1C    INR RESULTS:  No results found for: INR      Thank you for allowing me to participate in the care of your patient.    Sincerely,     ENRIKE Crocker [facility]

## 2018-06-21 ENCOUNTER — DOCUMENTATION ONLY (OUTPATIENT)
Dept: CARDIOLOGY | Facility: CLINIC | Age: 53
End: 2018-06-21

## 2018-06-21 NOTE — PROGRESS NOTES
Pt seen by Floresita Waddell CNP 6/20/18. Disability paperwork completed.    Faxed copy to number on paperwork ([phone]), copy sent to scan, original sent to pt's home address.     Updated pt on above.    Marlen Lamb [facility] RN 3:06 PM 06/21/18  [phone]

## 2018-06-22 ENCOUNTER — HOSPITAL ENCOUNTER (OUTPATIENT)
Dept: CARDIAC REHAB | Facility: CLINIC | Age: 53
End: 2018-06-22
Attending: INTERNAL MEDICINE
Payer: COMMERCIAL

## 2018-06-22 PROCEDURE — 40000116 ZZH STATISTIC OP CR VISIT: Performed by: CLINICAL EXERCISE PHYSIOLOGIST

## 2018-06-22 PROCEDURE — 93798 PHYS/QHP OP CAR RHAB W/ECG: CPT | Performed by: CLINICAL EXERCISE PHYSIOLOGIST

## 2018-06-25 ENCOUNTER — DOCUMENTATION ONLY (OUTPATIENT)
Dept: CARDIOLOGY | Facility: CLINIC | Age: 53
End: 2018-06-25

## 2018-06-25 ENCOUNTER — HOSPITAL ENCOUNTER (OUTPATIENT)
Dept: CARDIAC REHAB | Facility: CLINIC | Age: 53
End: 2018-06-25
Attending: INTERNAL MEDICINE
Payer: COMMERCIAL

## 2018-06-25 PROCEDURE — 40000116 ZZH STATISTIC OP CR VISIT

## 2018-06-25 PROCEDURE — 93798 PHYS/QHP OP CAR RHAB W/ECG: CPT

## 2018-06-25 NOTE — TELEPHONE ENCOUNTER
Echo was at time of MI and he has been stented    Try stress echo as ordered. If terrible resting [facility] they can call me but he is having atypical chest pain so want to try stress echo

## 2018-06-25 NOTE — PROGRESS NOTES
Received call from Lj in lab Pt is set up for stress echo and Pt previous echo shows-There is severe inferolateral wall hypokinesis.  Lj wondering if Pt should be doing nuc? RICHIE Mclaughlin RN

## 2018-06-26 ENCOUNTER — HOSPITAL ENCOUNTER (OUTPATIENT)
Dept: CARDIOLOGY | Facility: CLINIC | Age: 53
Discharge: HOME | End: 2018-06-26
Attending: NURSE PRACTITIONER | Admitting: NURSE PRACTITIONER
Payer: COMMERCIAL

## 2018-06-26 DIAGNOSIS — I21.19 ST ELEVATION MYOCARDIAL INFARCTION (STEMI) INVOLVING OTHER CORONARY ARTERY OF INFERIOR WALL (H): ICD-10-CM

## 2018-06-26 DIAGNOSIS — I20.89 OTHER FORMS OF ANGINA PECTORIS (H): Primary | ICD-10-CM

## 2018-06-26 PROCEDURE — 93350 STRESS TTE ONLY: CPT | Mod: 26 | Performed by: INTERNAL MEDICINE

## 2018-06-26 PROCEDURE — 93018 CV STRESS TEST I&R ONLY: CPT | Performed by: INTERNAL MEDICINE

## 2018-06-26 PROCEDURE — 93321 DOPPLER ECHO F-UP/LMTD STD: CPT | Mod: 26 | Performed by: INTERNAL MEDICINE

## 2018-06-26 PROCEDURE — 93017 CV STRESS TEST TRACING ONLY: CPT

## 2018-06-26 PROCEDURE — 93016 CV STRESS TEST SUPVJ ONLY: CPT | Performed by: INTERNAL MEDICINE

## 2018-06-26 PROCEDURE — 93325 DOPPLER ECHO COLOR FLOW MAPG: CPT | Mod: 26 | Performed by: INTERNAL MEDICINE

## 2018-06-27 ENCOUNTER — HOSPITAL ENCOUNTER (OUTPATIENT)
Dept: CARDIAC REHAB | Facility: CLINIC | Age: 53
End: 2018-06-27
Attending: INTERNAL MEDICINE
Payer: COMMERCIAL

## 2018-06-27 PROCEDURE — 93798 PHYS/QHP OP CAR RHAB W/ECG: CPT | Performed by: OCCUPATIONAL THERAPIST

## 2018-06-27 PROCEDURE — 40000116 ZZH STATISTIC OP CR VISIT: Performed by: OCCUPATIONAL THERAPIST

## 2018-06-29 ENCOUNTER — HOSPITAL ENCOUNTER (OUTPATIENT)
Dept: CARDIAC REHAB | Facility: CLINIC | Age: 53
End: 2018-06-29
Attending: INTERNAL MEDICINE
Payer: COMMERCIAL

## 2018-06-29 PROCEDURE — 40000116 ZZH STATISTIC OP CR VISIT: Performed by: OCCUPATIONAL THERAPIST

## 2018-06-29 PROCEDURE — 93798 PHYS/QHP OP CAR RHAB W/ECG: CPT | Performed by: OCCUPATIONAL THERAPIST

## 2018-07-02 ENCOUNTER — HOSPITAL ENCOUNTER (OUTPATIENT)
Dept: CARDIAC REHAB | Facility: CLINIC | Age: 53
End: 2018-07-02
Attending: INTERNAL MEDICINE
Payer: COMMERCIAL

## 2018-07-02 PROCEDURE — 93798 PHYS/QHP OP CAR RHAB W/ECG: CPT

## 2018-07-02 PROCEDURE — 40000116 ZZH STATISTIC OP CR VISIT

## 2018-07-03 ENCOUNTER — HOSPITAL ENCOUNTER (OUTPATIENT)
Dept: CARDIOLOGY | Facility: CLINIC | Age: 53
Discharge: HOME | End: 2018-07-03
Attending: NURSE PRACTITIONER | Admitting: NURSE PRACTITIONER
Payer: COMMERCIAL

## 2018-07-03 ENCOUNTER — DOCUMENTATION ONLY (OUTPATIENT)
Dept: CARDIOLOGY | Facility: CLINIC | Age: 53
End: 2018-07-03

## 2018-07-03 DIAGNOSIS — I20.89 OTHER FORMS OF ANGINA PECTORIS (H): ICD-10-CM

## 2018-07-03 PROCEDURE — 78452 HT MUSCLE IMAGE SPECT MULT: CPT

## 2018-07-03 PROCEDURE — 93018 CV STRESS TEST I&R ONLY: CPT | Performed by: INTERNAL MEDICINE

## 2018-07-03 PROCEDURE — A9502 TC99M TETROFOSMIN: HCPCS | Performed by: NURSE PRACTITIONER

## 2018-07-03 PROCEDURE — 34300033 ZZH RX 343: Performed by: NURSE PRACTITIONER

## 2018-07-03 PROCEDURE — 93016 CV STRESS TEST SUPVJ ONLY: CPT | Performed by: INTERNAL MEDICINE

## 2018-07-03 PROCEDURE — 78452 HT MUSCLE IMAGE SPECT MULT: CPT | Mod: 26 | Performed by: INTERNAL MEDICINE

## 2018-07-03 RX ADMIN — TETROFOSMIN 8.77 MCI.: 1.38 INJECTION, POWDER, LYOPHILIZED, FOR SOLUTION INTRAVENOUS at 10:38

## 2018-07-03 RX ADMIN — TETROFOSMIN 3.6 MCI.: 1.38 INJECTION, POWDER, LYOPHILIZED, FOR SOLUTION INTRAVENOUS at 08:55

## 2018-07-03 NOTE — PROGRESS NOTES
"Nuclear stress test shows his inferior MI with very mild martha-infarction ischemia.  I have reviewed this with Dr. Carter.  The patient continues to have an intermittent pins and needles feeling in his chest but has no \"elephant sitting on his chest\" discomfort which was his presenting symptom with his heart attack.  We talked about discontinuing Ranexa.  However, he is leaving on vacation for a week and is nervous about stopping it when he is gone.  I encouraged him to stay on it and try to stop this when he gets home.    He complains of intermittent palpitations which predated his heart attack.  We have seen no evidence of isolated PVCs.  I have told him if these worsen or asymptomatic in any way to call me and I would put him on a Holter monitor to quantify PVC burden.  "

## 2018-07-11 ENCOUNTER — TELEPHONE (OUTPATIENT)
Dept: CARDIAC REHAB | Facility: CLINIC | Age: 53
End: 2018-07-11

## 2018-07-11 ENCOUNTER — HOSPITAL ENCOUNTER (OUTPATIENT)
Dept: CARDIAC REHAB | Facility: CLINIC | Age: 53
End: 2018-07-11
Attending: INTERNAL MEDICINE
Payer: COMMERCIAL

## 2018-07-11 PROCEDURE — 40000116 ZZH STATISTIC OP CR VISIT: Performed by: REHABILITATION PRACTITIONER

## 2018-07-11 PROCEDURE — 93798 PHYS/QHP OP CAR RHAB W/ECG: CPT | Performed by: REHABILITATION PRACTITIONER

## 2018-07-11 NOTE — TELEPHONE ENCOUNTER
This message is regarding one of Dr. Carter's patients currently enrolled in Phase II OPCR at [facility]. Patient has been noticing frequent palpitations. Telemetry during exercise showed frequent PVCs, and he was in bigeminy approximately 50% of rehab session. Patient denies dizziness, but is aware of the premature beats.     Please feel free to call with questions or concerns  [phone]    Con Matt   Cardiac Rehab Therapist

## 2018-07-13 ENCOUNTER — HOSPITAL ENCOUNTER (OUTPATIENT)
Dept: CARDIAC REHAB | Facility: CLINIC | Age: 53
End: 2018-07-13
Attending: INTERNAL MEDICINE
Payer: COMMERCIAL

## 2018-07-13 PROCEDURE — 93798 PHYS/QHP OP CAR RHAB W/ECG: CPT

## 2018-07-13 PROCEDURE — 40000116 ZZH STATISTIC OP CR VISIT

## 2018-07-16 ENCOUNTER — HOSPITAL ENCOUNTER (OUTPATIENT)
Dept: CARDIAC REHAB | Facility: CLINIC | Age: 53
End: 2018-07-16
Attending: INTERNAL MEDICINE
Payer: COMMERCIAL

## 2018-07-16 PROCEDURE — 40000116 ZZH STATISTIC OP CR VISIT

## 2018-07-16 PROCEDURE — 93798 PHYS/QHP OP CAR RHAB W/ECG: CPT

## 2018-07-18 ENCOUNTER — HOSPITAL ENCOUNTER (OUTPATIENT)
Dept: CARDIAC REHAB | Facility: CLINIC | Age: 53
End: 2018-07-18
Attending: INTERNAL MEDICINE
Payer: COMMERCIAL

## 2018-07-18 PROCEDURE — 40000116 ZZH STATISTIC OP CR VISIT

## 2018-07-18 PROCEDURE — 93798 PHYS/QHP OP CAR RHAB W/ECG: CPT

## 2018-07-20 ENCOUNTER — HOSPITAL ENCOUNTER (OUTPATIENT)
Dept: CARDIAC REHAB | Facility: CLINIC | Age: 53
End: 2018-07-20
Attending: INTERNAL MEDICINE
Payer: COMMERCIAL

## 2018-07-20 PROCEDURE — 93798 PHYS/QHP OP CAR RHAB W/ECG: CPT

## 2018-07-20 PROCEDURE — 40000116 ZZH STATISTIC OP CR VISIT

## 2018-07-23 ENCOUNTER — HOSPITAL ENCOUNTER (OUTPATIENT)
Dept: CARDIAC REHAB | Facility: CLINIC | Age: 53
End: 2018-07-23
Attending: INTERNAL MEDICINE
Payer: COMMERCIAL

## 2018-07-23 PROCEDURE — 40000116 ZZH STATISTIC OP CR VISIT: Performed by: CLINICAL EXERCISE PHYSIOLOGIST

## 2018-07-23 PROCEDURE — 93798 PHYS/QHP OP CAR RHAB W/ECG: CPT | Performed by: CLINICAL EXERCISE PHYSIOLOGIST

## 2018-07-26 ENCOUNTER — HOSPITAL ENCOUNTER (OUTPATIENT)
Dept: CARDIAC REHAB | Facility: CLINIC | Age: 53
End: 2018-07-26
Attending: INTERNAL MEDICINE
Payer: COMMERCIAL

## 2018-07-26 PROCEDURE — 40000575 ZZH STATISTIC OP CARDIAC VISIT #2: Performed by: OCCUPATIONAL THERAPIST

## 2018-07-26 PROCEDURE — 40000116 ZZH STATISTIC OP CR VISIT: Performed by: OCCUPATIONAL THERAPIST

## 2018-07-26 PROCEDURE — 93797 PHYS/QHP OP CAR RHAB WO ECG: CPT | Performed by: OCCUPATIONAL THERAPIST

## 2018-07-26 PROCEDURE — 93798 PHYS/QHP OP CAR RHAB W/ECG: CPT | Performed by: OCCUPATIONAL THERAPIST

## 2018-07-30 ENCOUNTER — OFFICE VISIT (OUTPATIENT)
Dept: CARDIOLOGY | Facility: CLINIC | Age: 53
End: 2018-07-30
Attending: NURSE PRACTITIONER
Payer: COMMERCIAL

## 2018-07-30 VITALS
SYSTOLIC BLOOD PRESSURE: 113 MMHG | BODY MASS INDEX: 25.05 KG/M2 | DIASTOLIC BLOOD PRESSURE: 68 MMHG | HEIGHT: 73 IN | WEIGHT: 189 LBS | HEART RATE: 55 BPM

## 2018-07-30 DIAGNOSIS — I49.3 PVC'S (PREMATURE VENTRICULAR CONTRACTIONS): Primary | ICD-10-CM

## 2018-07-30 DIAGNOSIS — I21.19 ST ELEVATION (STEMI) MYOCARDIAL INFARCTION INVOLVING OTHER CORONARY ARTERY OF INFERIOR WALL (H): Primary | ICD-10-CM

## 2018-07-30 DIAGNOSIS — I21.19 ST ELEVATION MYOCARDIAL INFARCTION (STEMI) INVOLVING OTHER CORONARY ARTERY OF INFERIOR WALL (H): ICD-10-CM

## 2018-07-30 DIAGNOSIS — I49.01 CARDIAC ARREST WITH VENTRICULAR FIBRILLATION (H): ICD-10-CM

## 2018-07-30 DIAGNOSIS — I46.9 CARDIAC ARREST WITH VENTRICULAR FIBRILLATION (H): ICD-10-CM

## 2018-07-30 DIAGNOSIS — I21.19 ST ELEVATION (STEMI) MYOCARDIAL INFARCTION INVOLVING OTHER CORONARY ARTERY OF INFERIOR WALL (H): ICD-10-CM

## 2018-07-30 LAB
ALT SERPL W P-5'-P-CCNC: <5 U/L (ref 5–30)
CHOLEST SERPL-MCNC: 120 MG/DL
HDLC SERPL-MCNC: 46 MG/DL
LDLC SERPL CALC-MCNC: 61 MG/DL
NONHDLC SERPL-MCNC: 74 MG/DL
TRIGL SERPL-MCNC: 67 MG/DL

## 2018-07-30 PROCEDURE — 84460 ALANINE AMINO (ALT) (SGPT): CPT | Performed by: NURSE PRACTITIONER

## 2018-07-30 PROCEDURE — 99214 OFFICE O/P EST MOD 30 MIN: CPT | Performed by: INTERNAL MEDICINE

## 2018-07-30 PROCEDURE — 36415 COLL VENOUS BLD VENIPUNCTURE: CPT | Performed by: NURSE PRACTITIONER

## 2018-07-30 NOTE — LETTER
7/30/2018    Josué Vaughn MD  [facility] [address]  [facility] [address]    RE: Braulio Bunch       Dear Colleague,    I had the pleasure of seeing Braulio Bunch in the [facility].    HPI and Plan:   See dictation    Orders Placed This Encounter   Procedures     Follow-Up with Cardiac Advanced Practice Provider     Echocardiogram     No orders of the defined types were placed in this encounter.    Medications Discontinued During This Encounter   Medication Reason     ranolazine (RANEXA) 500 MG 12 hr tablet          Encounter Diagnoses   Name Primary?     Cardiac arrest with ventricular fibrillation (H)      ST elevation myocardial infarction (STEMI) involving other coronary artery of inferior wall (H)        CURRENT MEDICATIONS:  Current Outpatient Prescriptions   Medication Sig Dispense Refill     aspirin 81 MG EC tablet Take 1 tablet (81 mg) by mouth daily 30 tablet 11     metoprolol succinate (TOPROL-XL) 25 MG 24 hr tablet Take 1 tablet (25 mg) by mouth At Bedtime 90 tablet 3     nitroGLYcerin (NITROSTAT) 0.4 MG sublingual tablet For chest pain place 1 tablet under the tongue every 5 minutes for 3 doses. If symptoms persist 5 minutes after 1st dose call 911. 25 tablet 3     rosuvastatin (CRESTOR) 20 MG tablet Take 1 tablet (20 mg) by mouth daily 90 tablet 3     ticagrelor (BRILINTA) 90 MG tablet Take 1 tablet (90 mg) by mouth every 12 hours 180 tablet 3     VITAMIN D, CHOLECALCIFEROL, PO Take 3,000 Units by mouth daily Pt start taking 2000 unit daily.         ALLERGIES     Allergies   Allergen Reactions     Augmented Betamethasone Diprop [Betamethasone]        PAST MEDICAL HISTORY:  Past Medical History:   Diagnosis Date     Acute myocardial infarction 5/13/2018     CAD (coronary artery disease)     Cath 5/10/18- PCI with revascularization to OM- EVELYN placed; jailing of OM-1--dilated lad 30%; mild disease elsewhere     Hyperlipidemia      Kidney calculi       "Migraine      STEMI (ST elevation myocardial infarction) (H) 5/10/2018     V-tach (H)     during hospitalization 5/2018     Ventricular fibrillation (H)     5/10/18 in ER     Vitamin D deficiency        PAST SURGICAL HISTORY:  Past Surgical History:   Procedure Laterality Date     PARATHYROIDECTOMY         FAMILY HISTORY:  No family history on file.    SOCIAL HISTORY:  Social History     Social History     Marital status:      Spouse name: N/A     Number of children: N/A     Years of education: N/A     Social History Main Topics     Smoking status: Never Smoker     Smokeless tobacco: Never Used     Alcohol use Yes      Comment: rare     Drug use: None     Sexual activity: Not Asked     Other Topics Concern     None     Social History Narrative       Review of Systems:  Skin:  Negative     Eyes:  Positive for glasses  ENT:  Negative    Respiratory:  Negative    Cardiovascular:  Negative Positive for;palpitations  Gastroenterology: Negative    Genitourinary:  Negative    Musculoskeletal:  Negative    Neurologic:  Negative    Psychiatric:  Negative    Heme/Lymph/Imm:  Negative    Endocrine:  Negative      Physical Exam:  Vitals: /68  Pulse 55  Ht 1.854 m (6' 1\")  Wt 85.7 kg (189 lb)  BMI 24.94 kg/m2    Constitutional:           Skin:             Head:           Eyes:           Lymph:      ENT:           Neck:           Respiratory:            Cardiac:                                                           GI:           Extremities and Muscular Skeletal:                 Neurological:           Psych:         Recent Lab Results:  LIPID RESULTS:  Lab Results   Component Value Date    CHOL 120 07/30/2018    HDL 46 07/30/2018    LDL 61 07/30/2018    TRIG 67 07/30/2018       LIVER ENZYME RESULTS:  Lab Results   Component Value Date    ALT <5 (L) 07/30/2018       CBC RESULTS:  Lab Results   Component Value Date    WBC 10.0 05/11/2018    RBC 4.58 05/11/2018    HGB 13.7 05/11/2018    HCT 39.6 (L) 05/11/2018 "    MCV 87 05/11/2018    MCH 29.9 05/11/2018    MCHC 34.6 05/11/2018    RDW 12.5 05/11/2018     05/11/2018       BMP RESULTS:  Lab Results   Component Value Date     06/20/2018    POTASSIUM 3.7 06/20/2018    CHLORIDE 102 06/20/2018    CO2 30 (H) 06/20/2018    ANIONGAP 12.7 06/20/2018     06/20/2018    BUN 15 06/20/2018    CR 1.27 06/20/2018    GFRESTIMATED 60 (L) 06/20/2018    GFRESTBLACK 72 06/20/2018    KELY 10.0 06/20/2018        A1C RESULTS:  No results found for: A1C    INR RESULTS:  No results found for: INR        CC  ENRIKE Calvo CNP  [facility]  [address]  [place] [address]    Thank you for allowing me to participate in the care of your patient.      Sincerely,     Jeramy Carter MD     [facility]    cc:   ENRIKE Calvo CNP  [facility]  [address]  [place] [address]

## 2018-07-30 NOTE — PROGRESS NOTES
"Service Date: 07/30/2018      HISTORY OF PRESENT ILLNESS:  I had the pleasure of following up on our mutual patient, Braulio Bunch.  I met him in 05/2018.  You had diagnosed with hyperlipidemia, placed him on a statin and suggested an exercise program.  The patient unfortunately started the insanity program and probably had a myocardial infarction and then had a V-fib cardiac arrest in the hospital where I did a CPR.  I took him to the Cath Lab.  His left circumflex marginal was totally occluded, mostly with thrombus.  We opened it up.  We did USP a side branch but there was no other significant lesions left behind.  A nuclear stress test suggested heart function in the mid 30% range but the echocardiogram said it was in the 50s with lateral wall severe hypokinesis.  The patient is back to biking.  He did at least have frequent PVCs.  He does not feel them as much anymore but they were notable in cardiac rehabilitation.  He is done with cardiac rehabilitation.  Today, I explained the pathophysiology of coronary disease with cholesterol plaques in the wall.  We talked about what the angiogram showed.  We talked about the beta blocker trials for prevention.  We talked about the CAST trial for PVC suppression with type 1C antiarrhythmics being worse.  We talked about the use of beta blocker as a \"governor.\"   We stopped the Ranexa.  I would have liked to increase the beta blocker further but he runs heart rates in the 50s and blood pressures in the 90s to 100.  He is exercising regularly.  We talked about an exercise prescription.  He is still worried about the PVCs, so I am going to have him wear a Zio Patch for a week and come back and review.  If there are significant runs of ventricular tachycardia, he will go to the electrophysiologist for an EP study to determine if he needs an ICD but with function clearly above 35%, if it is isolated PVCs, I would not recommend that.  We will get an echocardiogram in 6 months " for followup.        Today's visit was 35 minutes, all counseling.      Jeramy Medellin MD       cc:   Josué Vaughn MD    [facility]    [address]   [place] [address]         JERAMY MEDELLIN MD             D: 2018   T: 2018   MT: BOBO      Name:     BRIEN KUHN   MRN:      [id number]        Account:      [id number]   :      1965           Service Date: 2018      Document: [id number]

## 2018-07-30 NOTE — LETTER
"7/30/2018      Josué Vaughn MD  [facility] [address]  [facility] [address]      RE: Braulio Bunch       Dear Colleague,    I had the pleasure of seeing Braulio Bunch in the [facility].    Service Date: 07/30/2018      HISTORY OF PRESENT ILLNESS:  I had the pleasure of following up on our mutual patient, Braulio Bunch.  I met him in 05/2018.  You had diagnosed with hyperlipidemia, placed him on a statin and suggested an exercise program.  The patient unfortunately started the insanity program and probably had a myocardial infarction and then had a V-fib cardiac arrest in the hospital where I did a CPR.  I took him to the Cath Lab.  His left circumflex marginal was totally occluded, mostly with thrombus.  We opened it up.  We did shelter a side branch but there was no other significant lesions left behind.  A nuclear stress test suggested heart function in the mid 30% range but the echocardiogram said it was in the 50s with lateral wall severe hypokinesis.  The patient is back to biking.  He did at least have frequent PVCs.  He does not feel them as much anymore but they were notable in cardiac rehabilitation.  He is done with cardiac rehabilitation.  Today, I explained the pathophysiology of coronary disease with cholesterol plaques in the wall.  We talked about what the angiogram showed.  We talked about the beta blocker trials for prevention.  We talked about the CAST trial for PVC suppression with type 1C antiarrhythmics being worse.  We talked about the use of beta blocker as a \"governor.\"   We stopped the Ranexa.  I would have liked to increase the beta blocker further but he runs heart rates in the 50s and blood pressures in the 90s to 100.  He is exercising regularly.  We talked about an exercise prescription.  He is still worried about the PVCs, so I am going to have him wear a Zio Patch for a week and come back and review.  If there are significant runs of " ventricular tachycardia, he will go to the electrophysiologist for an EP study to determine if he needs an ICD but with function clearly above 35%, if it is isolated PVCs, I would not recommend that.  We will get an echocardiogram in 6 months for followup.        Today's visit was 35 minutes, all counseling.      Jeramy Medellin MD       cc:   Josué Vaughn MD    [facility]    [address]   [place] [address]         JERAMY MEDELLIN MD             D: 2018   T: 2018   MT: BOBO      Name:     BRIEN KUHN   MRN:      [id number]        Account:      [id number]   :      1965           Service Date: 2018      Document: [id number]         Outpatient Encounter Prescriptions as of 2018   Medication Sig Dispense Refill     aspirin 81 MG EC tablet Take 1 tablet (81 mg) by mouth daily 30 tablet 11     metoprolol succinate (TOPROL-XL) 25 MG 24 hr tablet Take 1 tablet (25 mg) by mouth At Bedtime 90 tablet 3     nitroGLYcerin (NITROSTAT) 0.4 MG sublingual tablet For chest pain place 1 tablet under the tongue every 5 minutes for 3 doses. If symptoms persist 5 minutes after 1st dose call 911. 25 tablet 3     rosuvastatin (CRESTOR) 20 MG tablet Take 1 tablet (20 mg) by mouth daily 90 tablet 3     ticagrelor (BRILINTA) 90 MG tablet Take 1 tablet (90 mg) by mouth every 12 hours 180 tablet 3     VITAMIN D, CHOLECALCIFEROL, PO Take 3,000 Units by mouth daily Pt start taking 2000 unit daily.       [DISCONTINUED] ranolazine (RANEXA) 500 MG 12 hr tablet Take 1 tablet (500 mg) by mouth 2 times daily 180 tablet 3     No facility-administered encounter medications on file as of 2018.        Again, thank you for allowing me to participate in the care of your patient.      Sincerely,    Jeramy Medellin MD     [facility]

## 2018-07-30 NOTE — MR AVS SNAPSHOT
After Visit Summary   7/30/2018    Braulio Bunch    MRN: [id number]           Patient Information     Date Of Birth          1965        Visit Information        Provider Department      7/30/2018 8:15 AM Jeramy Carter MD [facility]        Today's Diagnoses     PVC's (premature ventricular contractions)    -  1    Cardiac arrest with ventricular fibrillation (H)        ST elevation myocardial infarction (STEMI) involving other coronary artery of inferior wall (H)           Follow-ups after your visit        Additional Services     Follow-Up with Cardiac Advanced Practice Provider           Follow-Up with Cardiac Advanced Practice Provider                 Your next 10 appointments already scheduled     Jul 31, 2018  8:30 AM CDT   ZIOPATCH MONITOR with [facility] ([facility])    [address]  [facility] [address] [phone]            Sep 19, 2018  7:30 AM CDT   Return Visit with ENRIKE Crocker CNP   [facility] ([facility])    [address] [address]  [facility] [address] [phone] OPT 2              Future tests that were ordered for you today     Open Future Orders        Priority Expected Expires Ordered    ZIO Patch Monitor Routine 8/6/2018 7/30/2019 7/30/2018    Follow-Up with Cardiac Advanced Practice Provider Routine 8/6/2018 7/30/2019 7/30/2018    Echocardiogram Routine 1/26/2019 7/30/2019 7/30/2018    Follow-Up with Cardiac Advanced Practice Provider Routine 1/26/2019 7/30/2019 7/30/2018            Who to contact     If you have questions or need follow up information about today's clinic visit or your schedule please contact [facility] directly at [phone].  Normal or non-critical lab and imaging results will be communicated to you by [employer], letter  "or phone within 4 business days after the clinic has received the results. If you do not hear from us within 7 days, please contact the clinic through [employer] or phone. If you have a critical or abnormal lab result, we will notify you by phone as soon as possible.  Submit refill requests through [employer] or call your pharmacy and they will forward the refill request to us. Please allow 3 business days for your refill to be completed.          Additional Information About Your Visit        [employer] Information     [employer] gives you secure access to your electronic health record. If you see a primary care provider, you can also send messages to your care team and make appointments. If you have questions, please call your primary care clinic.  If you do not have a primary care provider, please call [phone] and they will assist you.        Care EveryWhere ID     This is your Care EveryWhere ID. This could be used by other organizations to access your [facility] medical records  [id number]        Your Vitals Were     Pulse Height BMI (Body Mass Index)             55 1.854 m (6' 1\") 24.94 kg/m2          Blood Pressure from Last 3 Encounters:   07/30/18 113/68   06/20/18 105/70   05/25/18 108/70    Weight from Last 3 Encounters:   07/30/18 85.7 kg (189 lb)   06/20/18 85.3 kg (188 lb)   05/25/18 87.5 kg (193 lb)              We Performed the Following     Follow-Up with Cardiologist          Today's Medication Changes          These changes are accurate as of 7/30/18  9:14 AM.  If you have any questions, ask your nurse or doctor.               Stop taking these medicines if you haven't already. Please contact your care team if you have questions.     ranolazine 500 MG 12 hr tablet   Commonly known as:  RANEXA   Stopped by:  Jeramy Carter MD                    Primary Care Provider Office Phone # Fax #    Josué Vaughn [phone]       [facility] [address]  [facility] [address]   "      Equal Access to Services     [facility] : Hadii aad ku hadsummerladi Sobethali, waaxda luqadaha, qaybta kaantoniasera levin. So [facility] [phone].    ATENCIÓN: Si habla español, tiene a jean baptiste disposición servicios gratuitos de asistencia lingüística. Llame al [phone].    We comply with applicable federal civil rights laws and [place] laws. We do not discriminate on the basis of race, color, national origin, age, disability, sex, sexual orientation, or gender identity.            Thank you!     Thank you for choosing [facility]  for your care. Our goal is always to provide you with excellent care. Hearing back from our patients is one way we can continue to improve our services. Please take a few minutes to complete the written survey that you may receive in the mail after your visit with us. Thank you!             Your Updated Medication List - Protect others around you: Learn how to safely use, store and throw away your medicines at www.disposemymeds.org.          This list is accurate as of 7/30/18  9:14 AM.  Always use your most recent med list.                   Brand Name Dispense Instructions for use Diagnosis    aspirin 81 MG EC tablet     30 tablet    Take 1 tablet (81 mg) by mouth daily    ST elevation myocardial infarction (STEMI) involving other coronary artery of inferior wall (H)       metoprolol succinate 25 MG 24 hr tablet    TOPROL-XL    90 tablet    Take 1 tablet (25 mg) by mouth At Bedtime    ST elevation myocardial infarction (STEMI) involving other coronary artery of inferior wall (H)       nitroGLYcerin 0.4 MG sublingual tablet    NITROSTAT    25 tablet    For chest pain place 1 tablet under the tongue every 5 minutes for 3 doses. If symptoms persist 5 minutes after 1st dose call 911.    ST elevation myocardial infarction (STEMI) involving other coronary artery of inferior wall (H)       rosuvastatin 20 MG  tablet    CRESTOR    90 tablet    Take 1 tablet (20 mg) by mouth daily    ST elevation myocardial infarction (STEMI) involving other coronary artery of inferior wall (H)       ticagrelor 90 MG tablet    BRILINTA    180 tablet    Take 1 tablet (90 mg) by mouth every 12 hours    ST elevation myocardial infarction (STEMI) involving other coronary artery of inferior wall (H)       VITAMIN D (CHOLECALCIFEROL) PO      Take 3,000 Units by mouth daily Pt start taking 2000 unit daily.

## 2018-07-30 NOTE — PROGRESS NOTES
HPI and Plan:   See dictation    Orders Placed This Encounter   Procedures     Follow-Up with Cardiac Advanced Practice Provider     Echocardiogram     No orders of the defined types were placed in this encounter.    Medications Discontinued During This Encounter   Medication Reason     ranolazine (RANEXA) 500 MG 12 hr tablet          Encounter Diagnoses   Name Primary?     Cardiac arrest with ventricular fibrillation (H)      ST elevation myocardial infarction (STEMI) involving other coronary artery of inferior wall (H)        CURRENT MEDICATIONS:  Current Outpatient Prescriptions   Medication Sig Dispense Refill     aspirin 81 MG EC tablet Take 1 tablet (81 mg) by mouth daily 30 tablet 11     metoprolol succinate (TOPROL-XL) 25 MG 24 hr tablet Take 1 tablet (25 mg) by mouth At Bedtime 90 tablet 3     nitroGLYcerin (NITROSTAT) 0.4 MG sublingual tablet For chest pain place 1 tablet under the tongue every 5 minutes for 3 doses. If symptoms persist 5 minutes after 1st dose call 911. 25 tablet 3     rosuvastatin (CRESTOR) 20 MG tablet Take 1 tablet (20 mg) by mouth daily 90 tablet 3     ticagrelor (BRILINTA) 90 MG tablet Take 1 tablet (90 mg) by mouth every 12 hours 180 tablet 3     VITAMIN D, CHOLECALCIFEROL, PO Take 3,000 Units by mouth daily Pt start taking 2000 unit daily.         ALLERGIES     Allergies   Allergen Reactions     Augmented Betamethasone Diprop [Betamethasone]        PAST MEDICAL HISTORY:  Past Medical History:   Diagnosis Date     Acute myocardial infarction 5/13/2018     CAD (coronary artery disease)     Cath 5/10/18- PCI with revascularization to OM- EVELYN placed; jailing of OM-1--dilated lad 30%; mild disease elsewhere     Hyperlipidemia      Kidney calculi      Migraine      STEMI (ST elevation myocardial infarction) (H) 5/10/2018     V-tach (H)     during hospitalization 5/2018     Ventricular fibrillation (H)     5/10/18 in ER     Vitamin D deficiency        PAST SURGICAL HISTORY:  Past Surgical  "History:   Procedure Laterality Date     PARATHYROIDECTOMY         FAMILY HISTORY:  No family history on file.    SOCIAL HISTORY:  Social History     Social History     Marital status:      Spouse name: N/A     Number of children: N/A     Years of education: N/A     Social History Main Topics     Smoking status: Never Smoker     Smokeless tobacco: Never Used     Alcohol use Yes      Comment: rare     Drug use: None     Sexual activity: Not Asked     Other Topics Concern     None     Social History Narrative       Review of Systems:  Skin:  Negative     Eyes:  Positive for glasses  ENT:  Negative    Respiratory:  Negative    Cardiovascular:  Negative Positive for;palpitations  Gastroenterology: Negative    Genitourinary:  Negative    Musculoskeletal:  Negative    Neurologic:  Negative    Psychiatric:  Negative    Heme/Lymph/Imm:  Negative    Endocrine:  Negative      Physical Exam:  Vitals: /68  Pulse 55  Ht 1.854 m (6' 1\")  Wt 85.7 kg (189 lb)  BMI 24.94 kg/m2    Constitutional:           Skin:             Head:           Eyes:           Lymph:      ENT:           Neck:           Respiratory:            Cardiac:                                                           GI:           Extremities and Muscular Skeletal:                 Neurological:           Psych:         Recent Lab Results:  LIPID RESULTS:  Lab Results   Component Value Date    CHOL 120 07/30/2018    HDL 46 07/30/2018    LDL 61 07/30/2018    TRIG 67 07/30/2018       LIVER ENZYME RESULTS:  Lab Results   Component Value Date    ALT <5 (L) 07/30/2018       CBC RESULTS:  Lab Results   Component Value Date    WBC 10.0 05/11/2018    RBC 4.58 05/11/2018    HGB 13.7 05/11/2018    HCT 39.6 (L) 05/11/2018    MCV 87 05/11/2018    MCH 29.9 05/11/2018    MCHC 34.6 05/11/2018    RDW 12.5 05/11/2018     05/11/2018       BMP RESULTS:  Lab Results   Component Value Date     06/20/2018    POTASSIUM 3.7 06/20/2018    CHLORIDE 102 " 06/20/2018    CO2 30 (H) 06/20/2018    ANIONGAP 12.7 06/20/2018     06/20/2018    BUN 15 06/20/2018    CR 1.27 06/20/2018    GFRESTIMATED 60 (L) 06/20/2018    GFRESTBLACK 72 06/20/2018    KELY 10.0 06/20/2018        A1C RESULTS:  No results found for: A1C    INR RESULTS:  No results found for: INR        CC  Leida Wolff, APRN CNP  [facility]  [address]  [place] [id number]

## 2018-07-31 ENCOUNTER — HOSPITAL ENCOUNTER (OUTPATIENT)
Dept: CARDIOLOGY | Facility: CLINIC | Age: 53
Discharge: HOME | End: 2018-07-31
Attending: INTERNAL MEDICINE | Admitting: INTERNAL MEDICINE
Payer: COMMERCIAL

## 2018-07-31 DIAGNOSIS — I49.3 PVC'S (PREMATURE VENTRICULAR CONTRACTIONS): ICD-10-CM

## 2018-07-31 DIAGNOSIS — I49.01 CARDIAC ARREST WITH VENTRICULAR FIBRILLATION (H): ICD-10-CM

## 2018-07-31 DIAGNOSIS — I46.9 CARDIAC ARREST WITH VENTRICULAR FIBRILLATION (H): ICD-10-CM

## 2018-07-31 PROCEDURE — 0298T ZZC EXT ECG > 48HR TO 21 DAY REVIEW AND INTERPRETATN: CPT | Performed by: INTERNAL MEDICINE

## 2018-07-31 PROCEDURE — 0296T ZIO PATCH HOLTER: CPT

## 2018-08-14 ENCOUNTER — TELEPHONE (OUTPATIENT)
Dept: CARDIOLOGY | Facility: CLINIC | Age: 53
End: 2018-08-14

## 2018-08-14 NOTE — TELEPHONE ENCOUNTER
Please review Holter monitor results shows PVC's , PAC's and accelerated junctional Jazmin is to see Pt unless you want him to see EP. RICHIE Mclaughlin RN

## 2018-09-04 ENCOUNTER — OFFICE VISIT (OUTPATIENT)
Dept: CARDIOLOGY | Facility: CLINIC | Age: 53
End: 2018-09-04
Attending: INTERNAL MEDICINE
Payer: COMMERCIAL

## 2018-09-04 VITALS
BODY MASS INDEX: 25.21 KG/M2 | HEIGHT: 73 IN | DIASTOLIC BLOOD PRESSURE: 70 MMHG | WEIGHT: 190.2 LBS | HEART RATE: 54 BPM | SYSTOLIC BLOOD PRESSURE: 112 MMHG

## 2018-09-04 DIAGNOSIS — I25.10 CORONARY ARTERY DISEASE INVOLVING NATIVE CORONARY ARTERY OF NATIVE HEART WITHOUT ANGINA PECTORIS: Primary | ICD-10-CM

## 2018-09-04 DIAGNOSIS — I49.01 CARDIAC ARREST WITH VENTRICULAR FIBRILLATION (H): ICD-10-CM

## 2018-09-04 DIAGNOSIS — I49.3 PVC'S (PREMATURE VENTRICULAR CONTRACTIONS): ICD-10-CM

## 2018-09-04 DIAGNOSIS — I46.9 CARDIAC ARREST WITH VENTRICULAR FIBRILLATION (H): ICD-10-CM

## 2018-09-04 DIAGNOSIS — E78.2 MIXED HYPERLIPIDEMIA: ICD-10-CM

## 2018-09-04 PROCEDURE — 99214 OFFICE O/P EST MOD 30 MIN: CPT | Performed by: NURSE PRACTITIONER

## 2018-09-04 NOTE — MR AVS SNAPSHOT
After Visit Summary   9/4/2018    Braulio Bunch    MRN: [id number]           Patient Information     Date Of Birth          1965        Visit Information        Provider Department      9/4/2018 3:10 PM Floresita Tyson APRN CNP [facility]   [place]        Today's Diagnoses     Mixed hyperlipidemia    -  1    Cardiac arrest with ventricular fibrillation (H)        PVC's (premature ventricular contractions)          Care Instructions    No changes today          Follow-ups after your visit        Who to contact     If you have questions or need follow up information about today's clinic visit or your schedule please contact [facility]   [facility] directly at [phone].  Normal or non-critical lab and imaging results will be communicated to you by [employer], letter or phone within 4 business days after the clinic has received the results. If you do not hear from us within 7 days, please contact the clinic through [employer] or phone. If you have a critical or abnormal lab result, we will notify you by phone as soon as possible.  Submit refill requests through [employer] or call your pharmacy and they will forward the refill request to us. Please allow 3 business days for your refill to be completed.          Additional Information About Your Visit        MyChart Information     [employer] gives you secure access to your electronic health record. If you see a primary care provider, you can also send messages to your care team and make appointments. If you have questions, please call your primary care clinic.  If you do not have a primary care provider, please call [phone] and they will assist you.        Care EveryWhere ID     This is your Care EveryWhere ID. This could be used by other organizations to access your [facility] medical records  [id number]        Your Vitals Were     Pulse Height BMI (Body Mass Index)             54 1.854 m  "(6' 1\") 25.09 kg/m2          Blood Pressure from Last 3 Encounters:   09/04/18 112/70   07/30/18 113/68   06/20/18 105/70    Weight from Last 3 Encounters:   09/04/18 86.3 kg (190 lb 3.2 oz)   07/30/18 85.7 kg (189 lb)   06/20/18 85.3 kg (188 lb)              We Performed the Following     Follow-Up with Cardiac Advanced Practice Provider        Primary Care Provider Office Phone # Fax #    Josué Vaughn [phone]       [facility] [address]  [facility] [address]        Equal Access to Services     [facility] : Hadii moshe Sanchez, waaxda luqadaha, qaybta kaalmada adechelitada, sera ferraro. So [facility] [phone].    ATENCIÓN: Si habla español, tiene a jean baptiste disposición servicios gratuitos de asistencia lingüística. Llame al [phone].    We comply with applicable federal civil rights laws and [place] laws. We do not discriminate on the basis of race, color, national origin, age, disability, sex, sexual orientation, or gender identity.            Thank you!     Thank you for choosing [facility]  for your care. Our goal is always to provide you with excellent care. Hearing back from our patients is one way we can continue to improve our services. Please take a few minutes to complete the written survey that you may receive in the mail after your visit with us. Thank you!             Your Updated Medication List - Protect others around you: Learn how to safely use, store and throw away your medicines at www.disposemymeds.org.          This list is accurate as of 9/4/18  3:47 PM.  Always use your most recent med list.                   Brand Name Dispense Instructions for use Diagnosis    aspirin 81 MG EC tablet     30 tablet    Take 1 tablet (81 mg) by mouth daily    ST elevation myocardial infarction (STEMI) involving other coronary artery of inferior wall (H)       metoprolol succinate 25 MG 24 hr tablet "    TOPROL-XL    90 tablet    Take 1 tablet (25 mg) by mouth At Bedtime    ST elevation myocardial infarction (STEMI) involving other coronary artery of inferior wall (H)       nitroGLYcerin 0.4 MG sublingual tablet    NITROSTAT    25 tablet    For chest pain place 1 tablet under the tongue every 5 minutes for 3 doses. If symptoms persist 5 minutes after 1st dose call 911.    ST elevation myocardial infarction (STEMI) involving other coronary artery of inferior wall (H)       rosuvastatin 20 MG tablet    CRESTOR    90 tablet    Take 1 tablet (20 mg) by mouth daily    ST elevation myocardial infarction (STEMI) involving other coronary artery of inferior wall (H)       ticagrelor 90 MG tablet    BRILINTA    180 tablet    Take 1 tablet (90 mg) by mouth every 12 hours    ST elevation myocardial infarction (STEMI) involving other coronary artery of inferior wall (H)       VITAMIN D (CHOLECALCIFEROL) PO      Take 3,000 Units by mouth daily Pt start taking 2000 unit daily.

## 2018-09-04 NOTE — PROGRESS NOTES
"History of Present Illness:    Braulio Bunch is a 53 year old male followed here by Dr. Carter.  We met him in May 2018 when he presented with a STEMI.  He has a history of dyslipidemia and had stopped simvastatin for 4 years.  He saw his primary care prior to his MI and went down to his basement and began to do the \"insanity workout\" as he was advised to have lifestyle changes.  He began to have chest pain and was found to have an inferior wall MI while being transported to [facility] by ambulance.  He had a ventricular fibrillation arrest on the way to the Cath Lab.  He was shocked back to sinus rhythm but had torsades directly prior to the angiogram as well.  His troponin peaked at 103.  His ejection fraction is relatively well preserved at 50-55%.    He underwent a 4.0 x 60 mm Synergy drug-eluting stent into his occluded ostial OM 2 branch.  He had a 30% LAD stenosis in the right coronary had mild irregularities    He went on to have atypical chest discomfort afterwards which was treated with Ranexa and colchicine.  These have been discontinued.  He has undergone ischemic workups since his stents and has had no evidence of ischemia.  He still has brief stabbing episodes of chest pain come and go but is exercising twice a day and doing well    He was placed on Crestor in the hospital.  LDL is now 61 HDL 46 triglyceride 67.  His creatinine was slightly elevated after his procedure to 1.4 it is back to 1.27.    He had \"brain fog\" on Coreg he is now on low-dose metoprolol.  He does have Ryanaud's phenomenon so we need to watch for symptoms as winter approaches.    When he saw Dr. Carter he was having some PVCs in cardiac rehab.  A Ziopatch monitor was done showing accelerated idioventricular rhythm in the middle of night with some bradycardia during the night.  He denies any significant snoring history as does his wife.    Overall these were reviewed by Dr. Carter and given the normal LV function we " will not pursue an EP consult at this time.    He otherwise doing well.      Impression/Plan:     1.  Coronary artery disease withST segment elevation myocardial infarction in May of this year treated with drug-eluting stent to the circumflex  -He has non-flow-limiting disease elsewhere  -He has preserved LV function with low blood pressures therefore is not on lisinopril  -Continue Brilinta until May 10, 2019  2.  Ventricular fibrillation arrest on the way to the Cath Lab with intermittent torsades after he was defibrillated.  -No further arrhythmias  -Ziopatch was unremarkable  -Continue beta-blockers     3.  Dyslipidemia controlled-continue Crestor 20 mg a day  4.  Vitamin D deficiency on replacement through primary care  5. Mild kidney injury with chronic kidney disease post angiography.  Baseline creatinine 1.13 last check 1.27 peak 1.4  Remain off lisinopril  6. Raynaud's phenomenon-if symptoms worsen on toprol this winter, consider change to low dose Bystolic as intolerant to coreg as noted above.    We will see him back in January with repeat echocardiogram    He had multiple questions again today which I have done my best to answer.  Greater than 50% of this 30 minute visit today was spent in counseling    It has been a pleasure seeing Braulio NIKKI Xiaoin in follow up    Floresita Tyson, MSN, APRN-BC, CNP  Cardiology    No orders of the defined types were placed in this encounter.    No orders of the defined types were placed in this encounter.    There are no discontinued medications.      Encounter Diagnoses   Name Primary?     Cardiac arrest with ventricular fibrillation (H)      PVC's (premature ventricular contractions)      Mixed hyperlipidemia Yes       CURRENT MEDICATIONS:  Current Outpatient Prescriptions   Medication Sig Dispense Refill     aspirin 81 MG EC tablet Take 1 tablet (81 mg) by mouth daily 30 tablet 11     metoprolol succinate (TOPROL-XL) 25 MG 24 hr tablet Take 1 tablet (25 mg) by  mouth At Bedtime 90 tablet 3     nitroGLYcerin (NITROSTAT) 0.4 MG sublingual tablet For chest pain place 1 tablet under the tongue every 5 minutes for 3 doses. If symptoms persist 5 minutes after 1st dose call 911. 25 tablet 3     rosuvastatin (CRESTOR) 20 MG tablet Take 1 tablet (20 mg) by mouth daily 90 tablet 3     ticagrelor (BRILINTA) 90 MG tablet Take 1 tablet (90 mg) by mouth every 12 hours 180 tablet 3     VITAMIN D, CHOLECALCIFEROL, PO Take 3,000 Units by mouth daily Pt start taking 2000 unit daily.         ALLERGIES     Allergies   Allergen Reactions     Augmentin        PAST MEDICAL HISTORY:  Past Medical History:   Diagnosis Date     Acute myocardial infarction 5/13/2018     CAD (coronary artery disease)     Cath 5/10/18- PCI with revascularization to OM- EVELYN placed; jailing of OM-1--dilated lad 30%; mild disease elsewhere     Hyperlipidemia      Kidney calculi      Migraine      STEMI (ST elevation myocardial infarction) (H) 5/10/2018     Ventricular fibrillation (H)     5/10/18 in ER     Vitamin D deficiency        PAST SURGICAL HISTORY:  Past Surgical History:   Procedure Laterality Date     PARATHYROIDECTOMY         FAMILY HISTORY:  History reviewed. No pertinent family history.    SOCIAL HISTORY:  Social History     Social History     Marital status:      Spouse name: N/A     Number of children: N/A     Years of education: N/A     Social History Main Topics     Smoking status: Never Smoker     Smokeless tobacco: Never Used     Alcohol use Yes      Comment: rare     Drug use: None     Sexual activity: Not Asked     Other Topics Concern     None     Social History Narrative       Review of Systems:  Skin:  Negative       Eyes:  Positive for glasses    ENT:  Negative      Respiratory:  Negative       Cardiovascular:    Positive for;palpitations    Gastroenterology: Negative      Genitourinary:  Negative      Musculoskeletal:  Negative      Neurologic:  Negative      Psychiatric:  Negative     "  Heme/Lymph/Imm:  Positive for allergies    Endocrine:  Positive for   Has Reynaud's    Physical Exam:  Vitals: /70  Pulse 54  Ht 1.854 m (6' 1\")  Wt 86.3 kg (190 lb 3.2 oz)  BMI 25.09 kg/m2    Constitutional:  cooperative, alert and oriented, well developed, well nourished, in no acute distress        Skin:  warm and dry to the touch, no apparent skin lesions or masses noted          Head:  normocephalic, no masses or lesions        Eyes:  pupils equal and round, conjunctivae and lids unremarkable, sclera white, no xanthalasma, EOMS intact, no nystagmus        Lymph:      ENT:  no pallor or cyanosis, dentition good        Neck:  carotid pulses are full and equal bilaterally, JVP normal, no carotid bruit        Respiratory:  normal breath sounds, clear to auscultation, normal A-P diameter, normal symmetry, normal respiratory excursion, no use of accessory muscles         Cardiac: regular rhythm;normal S1 and S2   S4                                                     GI:           Extremities and Muscular Skeletal:  no deformities, clubbing, cyanosis, erythema observed;no edema              Neurological:  no gross motor deficits        Psych:  Alert and Oriented x 3      Recent Lab Results:  LIPID RESULTS:  Lab Results   Component Value Date    CHOL 120 07/30/2018    HDL 46 07/30/2018    LDL 61 07/30/2018    TRIG 67 07/30/2018       LIVER ENZYME RESULTS:  Lab Results   Component Value Date    ALT <5 (L) 07/30/2018       CBC RESULTS:  Lab Results   Component Value Date    WBC 10.0 05/11/2018    RBC 4.58 05/11/2018    HGB 13.7 05/11/2018    HCT 39.6 (L) 05/11/2018    MCV 87 05/11/2018    MCH 29.9 05/11/2018    MCHC 34.6 05/11/2018    RDW 12.5 05/11/2018     05/11/2018       BMP RESULTS:  Lab Results   Component Value Date     06/20/2018    POTASSIUM 3.7 06/20/2018    CHLORIDE 102 06/20/2018    CO2 30 (H) 06/20/2018    ANIONGAP 12.7 06/20/2018     06/20/2018    BUN 15 06/20/2018    CR " 1.27 06/20/2018    GFRESTIMATED 60 (L) 06/20/2018    GFRESTBLACK 72 06/20/2018    KELY 10.0 06/20/2018        A1C RESULTS:  No results found for: A1C    INR RESULTS:  No results found for: INR        CC  Jeramy Carter MD  [id number]  [place] [id number]

## 2018-09-04 NOTE — LETTER
"9/4/2018    Josué Vaughn MD  [facility] [address]  [facility] [address]    RE: Braulio Xiaoin       Dear Colleague,    I had the pleasure of seeing Braulio Bunch in the [facility].    History of Present Illness:    Braulio Bunch is a 53 year old male followed here by Dr. Carter.  We met him in May 2018 when he presented with a STEMI.  He has a history of dyslipidemia and had stopped simvastatin for 4 years.  He saw his primary care prior to his MI and went down to his basement and began to do the \"insanity workout\" as he was advised to have lifestyle changes.  He began to have chest pain and was found to have an inferior wall MI while being transported to [facility] by ambulance.  He had a ventricular fibrillation arrest on the way to the Cath Lab.  He was shocked back to sinus rhythm but had torsades directly prior to the angiogram as well.  His troponin peaked at 103.  His ejection fraction is relatively well preserved at 50-55%.    He underwent a 4.0 x 60 mm Synergy drug-eluting stent into his occluded ostial OM 2 branch.  He had a 30% LAD stenosis in the right coronary had mild irregularities    He went on to have atypical chest discomfort afterwards which was treated with Ranexa and colchicine.  These have been discontinued.  He has undergone ischemic workups since his stents and has had no evidence of ischemia.  He still has brief stabbing episodes of chest pain come and go but is exercising twice a day and doing well    He was placed on Crestor in the hospital.  LDL is now 61 HDL 46 triglyceride 67.  His creatinine was slightly elevated after his procedure to 1.4 it is back to 1.27.    He had \"brain fog\" on Coreg he is now on low-dose metoprolol.  He does have Ryanaud's phenomenon so we need to watch for symptoms as winter approaches.    When he saw Dr. Carter he was having some PVCs in cardiac rehab.  A Ziopatch monitor was done showing " accelerated idioventricular rhythm in the middle of night with some bradycardia during the night.  He denies any significant snoring history as does his wife.    Overall these were reviewed by Dr. Carter and given the normal LV function we will not pursue an EP consult at this time.    He otherwise doing well.      Impression/Plan:     1.  Coronary artery disease withST segment elevation myocardial infarction in May of this year treated with drug-eluting stent to the circumflex  -He has non-flow-limiting disease elsewhere  -He has preserved LV function with low blood pressures therefore is not on lisinopril  -Continue Brilinta until May 10, 2019  2.  Ventricular fibrillation arrest on the way to the Cath Lab with intermittent torsades after he was defibrillated.  -No further arrhythmias  -Ziopatch was unremarkable  -Continue beta-blockers     3.  Dyslipidemia controlled-continue Crestor 20 mg a day  4.  Vitamin D deficiency on replacement through primary care  5. Mild kidney injury with chronic kidney disease post angiography.  Baseline creatinine 1.13 last check 1.27 peak 1.4  Remain off lisinopril  6. Raynaud's phenomenon-if symptoms worsen on toprol this winter, consider change to low dose Bystolic as intolerant to coreg as noted above.    We will see him back in January with repeat echocardiogram    He had multiple questions again today which I have done my best to answer.  Greater than 50% of this 30 minute visit today was spent in counseling    It has been a pleasure seeing Braulio Bunch in follow up    Floresita Tyson, MSN, APRN-BC, CNP  Cardiology    No orders of the defined types were placed in this encounter.    No orders of the defined types were placed in this encounter.    There are no discontinued medications.      Encounter Diagnoses   Name Primary?     Cardiac arrest with ventricular fibrillation (H)      PVC's (premature ventricular contractions)      Mixed hyperlipidemia Yes       CURRENT  MEDICATIONS:  Current Outpatient Prescriptions   Medication Sig Dispense Refill     aspirin 81 MG EC tablet Take 1 tablet (81 mg) by mouth daily 30 tablet 11     metoprolol succinate (TOPROL-XL) 25 MG 24 hr tablet Take 1 tablet (25 mg) by mouth At Bedtime 90 tablet 3     nitroGLYcerin (NITROSTAT) 0.4 MG sublingual tablet For chest pain place 1 tablet under the tongue every 5 minutes for 3 doses. If symptoms persist 5 minutes after 1st dose call 911. 25 tablet 3     rosuvastatin (CRESTOR) 20 MG tablet Take 1 tablet (20 mg) by mouth daily 90 tablet 3     ticagrelor (BRILINTA) 90 MG tablet Take 1 tablet (90 mg) by mouth every 12 hours 180 tablet 3     VITAMIN D, CHOLECALCIFEROL, PO Take 3,000 Units by mouth daily Pt start taking 2000 unit daily.         ALLERGIES     Allergies   Allergen Reactions     Augmentin        PAST MEDICAL HISTORY:  Past Medical History:   Diagnosis Date     Acute myocardial infarction 5/13/2018     CAD (coronary artery disease)     Cath 5/10/18- PCI with revascularization to OM- EVELYN placed; jailing of OM-1--dilated lad 30%; mild disease elsewhere     Hyperlipidemia      Kidney calculi      Migraine      STEMI (ST elevation myocardial infarction) (H) 5/10/2018     Ventricular fibrillation (H)     5/10/18 in ER     Vitamin D deficiency        PAST SURGICAL HISTORY:  Past Surgical History:   Procedure Laterality Date     PARATHYROIDECTOMY         FAMILY HISTORY:  History reviewed. No pertinent family history.    SOCIAL HISTORY:  Social History     Social History     Marital status:      Spouse name: N/A     Number of children: N/A     Years of education: N/A     Social History Main Topics     Smoking status: Never Smoker     Smokeless tobacco: Never Used     Alcohol use Yes      Comment: rare     Drug use: None     Sexual activity: Not Asked     Other Topics Concern     None     Social History Narrative       Review of Systems:  Skin:  Negative       Eyes:  Positive for glasses    ENT:   "Negative      Respiratory:  Negative       Cardiovascular:    Positive for;palpitations    Gastroenterology: Negative      Genitourinary:  Negative      Musculoskeletal:  Negative      Neurologic:  Negative      Psychiatric:  Negative      Heme/Lymph/Imm:  Positive for allergies    Endocrine:  Positive for   Has Reynaud's    Physical Exam:  Vitals: /70  Pulse 54  Ht 1.854 m (6' 1\")  Wt 86.3 kg (190 lb 3.2 oz)  BMI 25.09 kg/m2    Constitutional:  cooperative, alert and oriented, well developed, well nourished, in no acute distress        Skin:  warm and dry to the touch, no apparent skin lesions or masses noted          Head:  normocephalic, no masses or lesions        Eyes:  pupils equal and round, conjunctivae and lids unremarkable, sclera white, no xanthalasma, EOMS intact, no nystagmus        Lymph:      ENT:  no pallor or cyanosis, dentition good        Neck:  carotid pulses are full and equal bilaterally, JVP normal, no carotid bruit        Respiratory:  normal breath sounds, clear to auscultation, normal A-P diameter, normal symmetry, normal respiratory excursion, no use of accessory muscles         Cardiac: regular rhythm;normal S1 and S2   S4                                                     GI:           Extremities and Muscular Skeletal:  no deformities, clubbing, cyanosis, erythema observed;no edema              Neurological:  no gross motor deficits        Psych:  Alert and Oriented x 3      Recent Lab Results:  LIPID RESULTS:  Lab Results   Component Value Date    CHOL 120 07/30/2018    HDL 46 07/30/2018    LDL 61 07/30/2018    TRIG 67 07/30/2018       LIVER ENZYME RESULTS:  Lab Results   Component Value Date    ALT <5 (L) 07/30/2018       CBC RESULTS:  Lab Results   Component Value Date    WBC 10.0 05/11/2018    RBC 4.58 05/11/2018    HGB 13.7 05/11/2018    HCT 39.6 (L) 05/11/2018    MCV 87 05/11/2018    MCH 29.9 05/11/2018    MCHC 34.6 05/11/2018    RDW 12.5 05/11/2018     " 05/11/2018       BMP RESULTS:  Lab Results   Component Value Date     06/20/2018    POTASSIUM 3.7 06/20/2018    CHLORIDE 102 06/20/2018    CO2 30 (H) 06/20/2018    ANIONGAP 12.7 06/20/2018     06/20/2018    BUN 15 06/20/2018    CR 1.27 06/20/2018    GFRESTIMATED 60 (L) 06/20/2018    GFRESTBLACK 72 06/20/2018    KELY 10.0 06/20/2018        A1C RESULTS:  No results found for: A1C    INR RESULTS:  No results found for: INR        Thank you for allowing me to participate in the care of your patient.    Sincerely,     ENRIKE Crocker CNP     [facility]

## 2018-12-28 ENCOUNTER — HOSPITAL ENCOUNTER (OUTPATIENT)
Dept: CARDIOLOGY | Facility: CLINIC | Age: 53
Discharge: HOME | End: 2018-12-28
Attending: INTERNAL MEDICINE | Admitting: INTERNAL MEDICINE
Payer: COMMERCIAL

## 2018-12-28 DIAGNOSIS — I46.9 CARDIAC ARREST WITH VENTRICULAR FIBRILLATION (H): ICD-10-CM

## 2018-12-28 DIAGNOSIS — I21.19 ST ELEVATION MYOCARDIAL INFARCTION (STEMI) INVOLVING OTHER CORONARY ARTERY OF INFERIOR WALL (H): ICD-10-CM

## 2018-12-28 DIAGNOSIS — I49.01 CARDIAC ARREST WITH VENTRICULAR FIBRILLATION (H): ICD-10-CM

## 2018-12-28 PROCEDURE — 93306 TTE W/DOPPLER COMPLETE: CPT

## 2018-12-28 PROCEDURE — 93306 TTE W/DOPPLER COMPLETE: CPT | Mod: 26 | Performed by: INTERNAL MEDICINE

## 2019-01-01 NOTE — PROGRESS NOTES
"History of Present Illness:     Braulio Bunch is a 53 year old male followed here by Dr. Carter who presents for follow up today and to review his echocardiogram.  We met him May 10th, 2018 when he presented with a STEMI.  He has a history of dyslipidemia and had stopped simvastatin for 4 years.  He saw his primary care prior to his MI and went down to his basement and began to do the \"insanity workout\" as he was advised to have lifestyle changes.  He began to have chest pain and was found to have an inferior wall MI while being transported to [facility] by ambulance.  He had a ventricular fibrillation arrest on the way to the Cath Lab.  He was shocked back to sinus rhythm but had torsades directly prior to the angiogram as well. He underwent a 4.0 x 60 mm Synergy drug-eluting stent into his occluded ostial OM 2 branch with jailing of OM-1 with PTCA.  He had a 30% LAD stenosis and the right coronary had mild irregularities     He went on to have atypical chest discomfort afterwards which was treated with Ranexa and colchicine.  These have been discontinued.  He has undergone ischemic workups since his stents and has had no evidence of ischemia by stress echo or nuclear scans. On nuclear scan in July, 2018 his infero, inferolateral MI was apparent with very mild martha-infarct ischemia.     He was placed on Crestor; LDL is now 61 HDL 46 triglyceride 67.  His creatinine was slightly elevated after his procedure to 1.4 it is back to 1.27.     He had \"brain fog\" on Coreg he is now on low-dose metoprolol.  He does have Ryanaud's phenomenon with mild finger symptoms this winter on Metoprolol. His only complaint is mild fatigue. He wishes to continue Metoprolol for now but if Raynaud's worsens, he will call and we can change to Bystolic. He continues to work out twice daily with no symptoms.     He has a history of PVCs in cardiac rehab.  A Ziopatch monitor was done showing accelerated idioventricular rhythm in " the middle of night with some bradycardia during the night.  He denies any significant snoring history as does his wife.Overall these were reviewed by Dr. Carter and given the normal LV function we did not pursue an EP consult.    LVEF by LV gram was 45% and increased to 50-55% on echocardiogram; Recent echocardiogram reveals EF of 55-60% with mild RV dilatation. Lateral, inferior and anterior RWMA appeared more prominent on this exam. I will ask Dr. Carter to review when he is back.             Impression/Plan:      1.  Coronary artery disease withST segment elevation myocardial infarction in May of this year treated with drug-eluting stent to the circumflex  -He has non-flow-limiting disease elsewhere  -He has preserved LV function with low blood pressures therefore is not on lisinopril  -Continue Brilinta until May 10, 2019  -see us back in May to discuss  -I have messaged Dr. Tate to review his echocardiogram for me and assess wall motion issues.    2.  Ventricular fibrillation arrest on the way to the Cath Lab with intermittent torsades after he was defibrillated.  -No further arrhythmias  -Ziopatch was unremarkable  -Continue beta-blockers      3.  Dyslipidemia controlled  -continue Crestor 20 mg a day  -recheck labs Spring    4.  Vitamin D deficiency on replacement through primary care    5. Mild kidney injury with chronic kidney disease post angiography.    -Baseline creatinine 1.13 last check 1.27 peak 1.4    Remain off lisinopril    6. Raynaud's phenomenon-if symptoms worsen on toprol this winter, consider change to low dose Bystolic as intolerant to coreg as noted above.       It has been a pleasure seeing Braulio Bunch in follow up. We will see him back in May and check lipids and likely stop his Brilinta then.    Greater than 50% of this 30 minute visit was spent in counseling    Floresita Tyson, MSN, APRN-BC, CNP  Cardiology    No orders of the defined types were placed in this  encounter.    No orders of the defined types were placed in this encounter.    There are no discontinued medications.      No diagnosis found.    CURRENT MEDICATIONS:  Current Outpatient Medications   Medication Sig Dispense Refill     aspirin 81 MG EC tablet Take 1 tablet (81 mg) by mouth daily 30 tablet 11     metoprolol succinate (TOPROL-XL) 25 MG 24 hr tablet Take 1 tablet (25 mg) by mouth At Bedtime 90 tablet 3     nitroGLYcerin (NITROSTAT) 0.4 MG sublingual tablet For chest pain place 1 tablet under the tongue every 5 minutes for 3 doses. If symptoms persist 5 minutes after 1st dose call 911. 25 tablet 3     rosuvastatin (CRESTOR) 20 MG tablet Take 1 tablet (20 mg) by mouth daily 90 tablet 3     ticagrelor (BRILINTA) 90 MG tablet Take 1 tablet (90 mg) by mouth every 12 hours 180 tablet 3     VITAMIN D, CHOLECALCIFEROL, PO Take 3,000 Units by mouth daily Pt start taking 2000 unit daily.         ALLERGIES     Allergies   Allergen Reactions     Augmentin      Coreg [Carvedilol]      Brain fog       PAST MEDICAL HISTORY:  Past Medical History:   Diagnosis Date     Acute myocardial infarction 5/13/2018     CAD (coronary artery disease)     Cath 5/10/18- PCI with revascularization to OM- EVELYN placed; jailing of OM-1--dilated lad 30%; mild disease elsewhere     Hyperlipidemia      Kidney calculi      Migraine      STEMI (ST elevation myocardial infarction) (H) 5/10/2018     Ventricular fibrillation (H)     5/10/18 in ER     Vitamin D deficiency        PAST SURGICAL HISTORY:  Past Surgical History:   Procedure Laterality Date     PARATHYROIDECTOMY         FAMILY HISTORY:  No family history on file.    SOCIAL HISTORY:  Social History     Socioeconomic History     Marital status:      Spouse name: Not on file     Number of children: Not on file     Years of education: Not on file     Highest education level: Not on file   Social Needs     Financial resource strain: Not on file     Food insecurity - worry: Not on  file     Food insecurity - inability: Not on file     Transportation needs - medical: Not on file     Transportation needs - non-medical: Not on file   Occupational History     Not on file   Tobacco Use     Smoking status: Never Smoker     Smokeless tobacco: Never Used   Substance and Sexual Activity     Alcohol use: Yes     Comment: rare     Drug use: Not on file     Sexual activity: Not on file   Other Topics Concern     Parent/sibling w/ CABG, MI or angioplasty before 65F 55M? Not Asked   Social History Narrative     Not on file       Review of Systems:  Skin:          Eyes:         ENT:         Respiratory:          Cardiovascular:         Gastroenterology:        Genitourinary:         Musculoskeletal:         Neurologic:         Psychiatric:         Heme/Lymph/Imm:         Endocrine:           Physical Exam:  Vitals: There were no vitals taken for this visit.    Constitutional:           Skin:             Head:           Eyes:           Lymph:      ENT:           Neck:           Respiratory:            Cardiac:                                                           GI:           Extremities and Muscular Skeletal:                 Neurological:           Psych:         Recent Lab Results:  LIPID RESULTS:  Lab Results   Component Value Date    CHOL 120 07/30/2018    HDL 46 07/30/2018    LDL 61 07/30/2018    TRIG 67 07/30/2018       LIVER ENZYME RESULTS:  Lab Results   Component Value Date    ALT <5 (L) 07/30/2018       CBC RESULTS:  Lab Results   Component Value Date    WBC 10.0 05/11/2018    RBC 4.58 05/11/2018    HGB 13.7 05/11/2018    HCT 39.6 (L) 05/11/2018    MCV 87 05/11/2018    MCH 29.9 05/11/2018    MCHC 34.6 05/11/2018    RDW 12.5 05/11/2018     05/11/2018       BMP RESULTS:  Lab Results   Component Value Date     06/20/2018    POTASSIUM 3.7 06/20/2018    CHLORIDE 102 06/20/2018    CO2 30 (H) 06/20/2018    ANIONGAP 12.7 06/20/2018     06/20/2018    BUN 15 06/20/2018    CR 1.27  06/20/2018    GFRESTIMATED 60 (L) 06/20/2018    GFRESTBLACK 72 06/20/2018    KELY 10.0 06/20/2018        A1C RESULTS:  No results found for: A1C    INR RESULTS:  No results found for: INR        CC  [facility]  [id number] [id number]

## 2019-01-02 ENCOUNTER — DOCUMENTATION ONLY (OUTPATIENT)
Dept: CARDIOLOGY | Facility: CLINIC | Age: 54
End: 2019-01-02

## 2019-01-02 ENCOUNTER — OFFICE VISIT (OUTPATIENT)
Dept: CARDIOLOGY | Facility: CLINIC | Age: 54
End: 2019-01-02
Payer: COMMERCIAL

## 2019-01-02 VITALS
BODY MASS INDEX: 25.05 KG/M2 | SYSTOLIC BLOOD PRESSURE: 102 MMHG | HEIGHT: 73 IN | WEIGHT: 189 LBS | DIASTOLIC BLOOD PRESSURE: 74 MMHG | HEART RATE: 78 BPM

## 2019-01-02 DIAGNOSIS — I49.01 CARDIAC ARREST WITH VENTRICULAR FIBRILLATION (H): ICD-10-CM

## 2019-01-02 DIAGNOSIS — I46.9 CARDIAC ARREST WITH VENTRICULAR FIBRILLATION (H): ICD-10-CM

## 2019-01-02 DIAGNOSIS — I21.19 ST ELEVATION (STEMI) MYOCARDIAL INFARCTION INVOLVING OTHER CORONARY ARTERY OF INFERIOR WALL (H): ICD-10-CM

## 2019-01-02 DIAGNOSIS — I25.10 CORONARY ARTERY DISEASE INVOLVING NATIVE CORONARY ARTERY OF NATIVE HEART WITHOUT ANGINA PECTORIS: Primary | ICD-10-CM

## 2019-01-02 DIAGNOSIS — I49.3 PVC'S (PREMATURE VENTRICULAR CONTRACTIONS): ICD-10-CM

## 2019-01-02 DIAGNOSIS — I21.19 ST ELEVATION MYOCARDIAL INFARCTION (STEMI) INVOLVING OTHER CORONARY ARTERY OF INFERIOR WALL (H): ICD-10-CM

## 2019-01-02 PROCEDURE — 99214 OFFICE O/P EST MOD 30 MIN: CPT | Performed by: NURSE PRACTITIONER

## 2019-01-02 ASSESSMENT — MIFFLIN-ST. JEOR: SCORE: 1761.18

## 2019-01-02 NOTE — LETTER
"1/2/2019    Josué Vaughn MD  [facility] [address]  [facility] [address]    RE: Braulio Bunch       Dear Colleague,    I had the pleasure of seeing Braulio Bunch in the [facility].    History of Present Illness:     Braulio Bunch is a 53 year old male followed here by Dr. Carter  who presents for follow up today and to review his echocardiogram.  We met him May 10th, 2018 when he presented with a STEMI.  He has a history of dyslipidemia and had stopped simvastatin for 4 years.  He saw his primary care prior to his MI and went down to his basement and began to do the \"insanity workout\" as he was advised to have lifestyle changes.  He began to have chest pain and was found to have an inferior wall MI while being transported to [facility] by ambulance.  He had a ventricular fibrillation arrest on the way to the Cath Lab.  He was shocked back to sinus rhythm but had torsades directly prior to the angiogram as well. He underwent a 4.0 x 60 mm Synergy drug-eluting stent into his occluded ostial OM 2 branch with jailing of OM-1 with PTCA.  He had a 30% LAD stenosis and the right coronary had mild irregularities     He went on to have atypical chest discomfort afterwards which was treated with Ranexa and colchicine.  These have been discontinued.  He has undergone ischemic workups since his stents and has had no evidence of ischemia by stress echo or nuclear scans. On nuclear scan in July, 2018 his infero, inferolateral MI was apparent with very mild martha-infarct ischemia.     He was placed on Crestor; LDL is now 61 HDL 46 triglyceride 67.  His creatinine was slightly elevated after his procedure to 1.4 it is back to 1.27.     He had \"brain fog\" on Coreg he is now on low-dose metoprolol.  He does have Ryanaud's phenomenon  with mild finger symptoms this winter on Metoprolol. His only complaint is mild fatigue. He wishes to continue Metoprolol for now but if " Raynaud's worsens, he will call and we can change to Bystolic. He continues to work out twice daily with no symptoms.     He has a history of PVCs in cardiac rehab.  A Ziopatch monitor was done showing accelerated idioventricular rhythm in the middle of night with some bradycardia during the night.  He denies any significant snoring history as does his wife.Overall these were reviewed by Dr. Carter and given the normal LV function we did not pursue an EP consult.    LVEF by LV gram was 45% and increased to 50-55% on echocardiogram; Recent echocardiogram reveals EF of 55-60% with mild RV dilatation. Lateral, inferior and anterior RWMA appeared more prominent on this exam. I will ask Dr. Carter to review when he is back.             Impression/Plan:      1.  Coronary artery disease withST segment elevation myocardial infarction in May of this year treated with drug-eluting stent to the circumflex  -He has non-flow-limiting disease elsewhere  -He has preserved LV function with low blood pressures therefore is not on lisinopril  -Continue Brilinta until May 10, 2019  -see us back in May to discuss  -I have messaged Dr. Tate to review his echocardiogram for me and assess wall motion issues.    2.  Ventricular fibrillation arrest on the way to the Cath Lab with intermittent torsades after he was defibrillated.  -No further arrhythmias  -Ziopatch was unremarkable  -Continue beta-blockers      3.  Dyslipidemia controlled  -continue Crestor 20 mg a day  -recheck labs Spring    4.  Vitamin D deficiency on replacement through primary care    5. Mild kidney injury with chronic kidney disease post angiography.    -Baseline creatinine 1.13 last check 1.27 peak 1.4    Remain off lisinopril    6. Raynaud's phenomenon-if symptoms worsen on toprol this winter, consider change to low dose Bystolic as intolerant to coreg as noted above.       It has been a pleasure seeing Braulio Bunch in follow up. We will see him back in May  and check lipids and likely stop his Brilinta then.    Greater than 50% of this 30 minute visit was spent in counseling    Floresita Tyson, MSN, APRN-BC, CNP  Cardiology    No orders of the defined types were placed in this encounter.    No orders of the defined types were placed in this encounter.    There are no discontinued medications.      No diagnosis found.    CURRENT MEDICATIONS:  Current Outpatient Medications   Medication Sig Dispense Refill     aspirin 81 MG EC tablet Take 1 tablet (81 mg) by mouth daily 30 tablet 11     metoprolol succinate (TOPROL-XL) 25 MG 24 hr tablet Take 1 tablet (25 mg) by mouth At Bedtime 90 tablet 3     nitroGLYcerin (NITROSTAT) 0.4 MG sublingual tablet For chest pain place 1 tablet under the tongue every 5 minutes for 3 doses. If symptoms persist 5 minutes after 1st dose call 911. 25 tablet 3     rosuvastatin (CRESTOR) 20 MG tablet Take 1 tablet (20 mg) by mouth daily 90 tablet 3     ticagrelor (BRILINTA) 90 MG tablet Take 1 tablet (90 mg) by mouth every 12 hours 180 tablet 3     VITAMIN D, CHOLECALCIFEROL, PO Take 3,000 Units by mouth daily Pt start taking 2000 unit daily.         ALLERGIES     Allergies   Allergen Reactions     Augmentin      Coreg [Carvedilol]      Brain fog       PAST MEDICAL HISTORY:  Past Medical History:   Diagnosis Date     Acute myocardial infarction 5/13/2018     CAD (coronary artery disease)     Cath 5/10/18- PCI with revascularization to OM- EVELYN placed; jailing of OM-1--dilated lad 30%; mild disease elsewhere     Hyperlipidemia      Kidney calculi      Migraine      STEMI (ST elevation myocardial infarction) (H) 5/10/2018     Ventricular fibrillation (H)     5/10/18 in ER     Vitamin D deficiency        PAST SURGICAL HISTORY:  Past Surgical History:   Procedure Laterality Date     PARATHYROIDECTOMY         FAMILY HISTORY:  No family history on file.    SOCIAL HISTORY:  Social History     Socioeconomic History     Marital status:       Spouse name: Not on file     Number of children: Not on file     Years of education: Not on file     Highest education level: Not on file   Social Needs     Financial resource strain: Not on file     Food insecurity - worry: Not on file     Food insecurity - inability: Not on file     Transportation needs - medical: Not on file     Transportation needs - non-medical: Not on file   Occupational History     Not on file   Tobacco Use     Smoking status: Never Smoker     Smokeless tobacco: Never Used   Substance and Sexual Activity     Alcohol use: Yes     Comment: rare     Drug use: Not on file     Sexual activity: Not on file   Other Topics Concern     Parent/sibling w/ CABG, MI or angioplasty before 65F 55M? Not Asked   Social History Narrative     Not on file       Review of Systems:  Skin:          Eyes:         ENT:         Respiratory:          Cardiovascular:         Gastroenterology:        Genitourinary:         Musculoskeletal:         Neurologic:         Psychiatric:         Heme/Lymph/Imm:         Endocrine:           Physical Exam:  Vitals: There were no vitals taken for this visit.    Constitutional:           Skin:             Head:           Eyes:           Lymph:      ENT:           Neck:           Respiratory:            Cardiac:                                                           GI:           Extremities and Muscular Skeletal:                 Neurological:           Psych:         Recent Lab Results:  LIPID RESULTS:  Lab Results   Component Value Date    CHOL 120 07/30/2018    HDL 46 07/30/2018    LDL 61 07/30/2018    TRIG 67 07/30/2018       LIVER ENZYME RESULTS:  Lab Results   Component Value Date    ALT <5 (L) 07/30/2018       CBC RESULTS:  Lab Results   Component Value Date    WBC 10.0 05/11/2018    RBC 4.58 05/11/2018    HGB 13.7 05/11/2018    HCT 39.6 (L) 05/11/2018    MCV 87 05/11/2018    MCH 29.9 05/11/2018    MCHC 34.6 05/11/2018    RDW 12.5 05/11/2018     05/11/2018       BMP  RESULTS:  Lab Results   Component Value Date     06/20/2018    POTASSIUM 3.7 06/20/2018    CHLORIDE 102 06/20/2018    CO2 30 (H) 06/20/2018    ANIONGAP 12.7 06/20/2018     06/20/2018    BUN 15 06/20/2018    CR 1.27 06/20/2018    GFRESTIMATED 60 (L) 06/20/2018    GFRESTBLACK 72 06/20/2018    KELY 10.0 06/20/2018        A1C RESULTS:  No results found for: A1C    INR RESULTS:  No results found for: INR          Thank you for allowing me to participate in the care of your patient.    Sincerely,     ENRIKE Crocker CNP     [facility]

## 2019-01-02 NOTE — LETTER
"1/2/2019    Josué Vaughn MD  [facility] [address]  [facility] [address]    RE: Braulio Bunch       Dear Colleague,    I had the pleasure of seeing Braulio Bunch in the [facility].    History of Present Illness:     Braluio Bunch is a 53 year old male followed here by Dr. Carter  who presents for follow up today and to review his echocardiogram.  We met him May 10th, 2018 when he presented with a STEMI.  He has a history of dyslipidemia and had stopped simvastatin for 4 years.  He saw his primary care prior to his MI and went down to his basement and began to do the \"insanity workout\" as he was advised to have lifestyle changes.  He began to have chest pain and was found to have an inferior wall MI while being transported to [facility] by ambulance.  He had a ventricular fibrillation arrest on the way to the Cath Lab.  He was shocked back to sinus rhythm but had torsades directly prior to the angiogram as well. He underwent a 4.0 x 60 mm Synergy drug-eluting stent into his occluded ostial OM 2 branch with jailing of OM-1 with PTCA.  He had a 30% LAD stenosis and the right coronary had mild irregularities     He went on to have atypical chest discomfort afterwards which was treated with Ranexa and colchicine.  These have been discontinued.  He has undergone ischemic workups since his stents and has had no evidence of ischemia by stress echo or nuclear scans. On nuclear scan in July, 2018 his infero, inferolateral MI was apparent with very mild martha-infarct ischemia.     He was placed on Crestor; LDL is now 61 HDL 46 triglyceride 67.  His creatinine was slightly elevated after his procedure to 1.4 it is back to 1.27.     He had \"brain fog\" on Coreg he is now on low-dose metoprolol.  He does have Ryanaud's phenomenon  with mild finger symptoms this winter on Metoprolol. His only complaint is mild fatigue. He wishes to continue Metoprolol for now but if " Raynaud's worsens, he will call and we can change to Bystolic. He continues to work out twice daily with no symptoms.     He has a history of PVCs in cardiac rehab.  A Ziopatch monitor was done showing accelerated idioventricular rhythm in the middle of night with some bradycardia during the night.  He denies any significant snoring history as does his wife.Overall these were reviewed by Dr. Carter and given the normal LV function we did not pursue an EP consult.    LVEF by LV gram was 45% and increased to 50-55% on echocardiogram; Recent echocardiogram reveals EF of 55-60% with mild RV dilatation. Lateral, inferior and anterior RWMA appeared more prominent on this exam. I will ask Dr. Carter to review when he is back.             Impression/Plan:      1.  Coronary artery disease withST segment elevation myocardial infarction in May of this year treated with drug-eluting stent to the circumflex  -He has non-flow-limiting disease elsewhere  -He has preserved LV function with low blood pressures therefore is not on lisinopril  -Continue Brilinta until May 10, 2019  -see us back in May to discuss  -I have messaged Dr. Tate to review his echocardiogram for me and assess wall motion issues.    2.  Ventricular fibrillation arrest on the way to the Cath Lab with intermittent torsades after he was defibrillated.  -No further arrhythmias  -Ziopatch was unremarkable  -Continue beta-blockers      3.  Dyslipidemia controlled  -continue Crestor 20 mg a day  -recheck labs Spring    4.  Vitamin D deficiency on replacement through primary care    5. Mild kidney injury with chronic kidney disease post angiography.    -Baseline creatinine 1.13 last check 1.27 peak 1.4    Remain off lisinopril    6. Raynaud's phenomenon-if symptoms worsen on toprol this winter, consider change to low dose Bystolic as intolerant to coreg as noted above.       It has been a pleasure seeing Braulio Bunch in follow up. We will see him back in May  and check lipids and likely stop his Brilinta then.    Greater than 50% of this 30 minute visit was spent in counseling    Floresita Tyson, MSN, APRN-BC, CNP  Cardiology    No orders of the defined types were placed in this encounter.    No orders of the defined types were placed in this encounter.    There are no discontinued medications.      No diagnosis found.    CURRENT MEDICATIONS:  Current Outpatient Medications   Medication Sig Dispense Refill     aspirin 81 MG EC tablet Take 1 tablet (81 mg) by mouth daily 30 tablet 11     metoprolol succinate (TOPROL-XL) 25 MG 24 hr tablet Take 1 tablet (25 mg) by mouth At Bedtime 90 tablet 3     nitroGLYcerin (NITROSTAT) 0.4 MG sublingual tablet For chest pain place 1 tablet under the tongue every 5 minutes for 3 doses. If symptoms persist 5 minutes after 1st dose call 911. 25 tablet 3     rosuvastatin (CRESTOR) 20 MG tablet Take 1 tablet (20 mg) by mouth daily 90 tablet 3     ticagrelor (BRILINTA) 90 MG tablet Take 1 tablet (90 mg) by mouth every 12 hours 180 tablet 3     VITAMIN D, CHOLECALCIFEROL, PO Take 3,000 Units by mouth daily Pt start taking 2000 unit daily.         ALLERGIES     Allergies   Allergen Reactions     Augmentin      Coreg [Carvedilol]      Brain fog       PAST MEDICAL HISTORY:  Past Medical History:   Diagnosis Date     Acute myocardial infarction 5/13/2018     CAD (coronary artery disease)     Cath 5/10/18- PCI with revascularization to OM- EVELYN placed; jailing of OM-1--dilated lad 30%; mild disease elsewhere     Hyperlipidemia      Kidney calculi      Migraine      STEMI (ST elevation myocardial infarction) (H) 5/10/2018     Ventricular fibrillation (H)     5/10/18 in ER     Vitamin D deficiency        PAST SURGICAL HISTORY:  Past Surgical History:   Procedure Laterality Date     PARATHYROIDECTOMY         FAMILY HISTORY:  No family history on file.    SOCIAL HISTORY:  Social History     Socioeconomic History     Marital status:       Spouse name: Not on file     Number of children: Not on file     Years of education: Not on file     Highest education level: Not on file   Social Needs     Financial resource strain: Not on file     Food insecurity - worry: Not on file     Food insecurity - inability: Not on file     Transportation needs - medical: Not on file     Transportation needs - non-medical: Not on file   Occupational History     Not on file   Tobacco Use     Smoking status: Never Smoker     Smokeless tobacco: Never Used   Substance and Sexual Activity     Alcohol use: Yes     Comment: rare     Drug use: Not on file     Sexual activity: Not on file   Other Topics Concern     Parent/sibling w/ CABG, MI or angioplasty before 65F 55M? Not Asked   Social History Narrative     Not on file       Review of Systems:  Skin:          Eyes:         ENT:         Respiratory:          Cardiovascular:         Gastroenterology:        Genitourinary:         Musculoskeletal:         Neurologic:         Psychiatric:         Heme/Lymph/Imm:         Endocrine:           Physical Exam:  Vitals: There were no vitals taken for this visit.    Constitutional:           Skin:             Head:           Eyes:           Lymph:      ENT:           Neck:           Respiratory:            Cardiac:                                                           GI:           Extremities and Muscular Skeletal:                 Neurological:           Psych:         Recent Lab Results:  LIPID RESULTS:  Lab Results   Component Value Date    CHOL 120 07/30/2018    HDL 46 07/30/2018    LDL 61 07/30/2018    TRIG 67 07/30/2018       LIVER ENZYME RESULTS:  Lab Results   Component Value Date    ALT <5 (L) 07/30/2018       CBC RESULTS:  Lab Results   Component Value Date    WBC 10.0 05/11/2018    RBC 4.58 05/11/2018    HGB 13.7 05/11/2018    HCT 39.6 (L) 05/11/2018    MCV 87 05/11/2018    MCH 29.9 05/11/2018    MCHC 34.6 05/11/2018    RDW 12.5 05/11/2018     05/11/2018       BMP  RESULTS:  Lab Results   Component Value Date     06/20/2018    POTASSIUM 3.7 06/20/2018    CHLORIDE 102 06/20/2018    CO2 30 (H) 06/20/2018    ANIONGAP 12.7 06/20/2018     06/20/2018    BUN 15 06/20/2018    CR 1.27 06/20/2018    GFRESTIMATED 60 (L) 06/20/2018    GFRESTBLACK 72 06/20/2018    KELY 10.0 06/20/2018        A1C RESULTS:  No results found for: A1C    INR RESULTS:  No results found for: INR        CC  [facility]  [id number] [id number]                  Thank you for allowing me to participate in the care of your patient.      Sincerely,     ENRIKE Crocker CNP     [facility]    cc:   [facility]  [address] [id number]

## 2019-01-02 NOTE — PATIENT INSTRUCTIONS
See us back in May with fasting labs    We will likely stop Brilinta then    If your Raynaud's flares, call me and we can change to [facility]  [phone]

## 2019-01-02 NOTE — PROGRESS NOTES
Can you look at his last echo    We stented OM 1 in May 2018    His ef has rebounded and he has no symptoms    They talk about more prominent WMA's    He has had 2 stress tests since stent both showing now ischemia    Let me know what you think

## 2019-01-09 NOTE — PROGRESS NOTES
I have called patient with Dr. Barrow recommendations for a CT coronary angiogram and oximeter to assess for sleep apnea.    The patient states when he sleeps on his back at home his wife notes that he has apneic episodes.  He denies snoring.  He would be open to having an overnight oximetry versus study consult.    When I brought up the need to do a CT coronary angiogram based on decreased function in the inferior wall, he became quite nervous.  He states in the 1990s he had a CT contrast and had significant nausea and vomiting.  As a research through the records he had Optiray at that point.  He had Isovue during his coronary angiogram this past year and had no reactions other than a mild creatinine elevation from 0.8 up to 1.4 and this did improve.  He received 275 cc of dye for his coronary angiogram.    Based on these concerns I verified with the CT staff that we use Isovue for our coronary CTs and I spoke to Dr. Carter.    We would still like to proceed with the CT scan.  I would check a basic metabolic panel prior to the test and certainly follow closely after.  I will contact the patient tomorrow with Dr. Carter input and hopefully move forward with the above plan.

## 2019-01-10 DIAGNOSIS — I51.89 RIGHT VENTRICULAR SYSTOLIC DYSFUNCTION WITHOUT HEART FAILURE: Primary | ICD-10-CM

## 2019-01-10 NOTE — PROGRESS NOTES
Called patient back. He is willing to proceed with CTA    I want to check BMP first to assure that creatinine is stable, if so we will schedule CTA a few days later    Will order an overnight oximeter at [facility]    Follow up with me after CTA to review plan    He is considering changing to bystolic for raynaud's issue which we can discuss at the follow up visit.

## 2019-01-14 DIAGNOSIS — I25.10 CORONARY ARTERY DISEASE INVOLVING NATIVE CORONARY ARTERY OF NATIVE HEART WITHOUT ANGINA PECTORIS: ICD-10-CM

## 2019-01-14 LAB
ANION GAP SERPL CALCULATED.3IONS-SCNC: 2.7 MMOL/L (ref 6–17)
BUN SERPL-MCNC: 17 MG/DL (ref 7–30)
CALCIUM SERPL-MCNC: 9.4 MG/DL (ref 8.5–10.5)
CHLORIDE SERPL-SCNC: 103 MMOL/L (ref 98–107)
CO2 SERPL-SCNC: 31 MMOL/L (ref 23–29)
CREAT SERPL-MCNC: 1.16 MG/DL (ref 0.7–1.3)
GFR SERPL CREATININE-BSD FRML MDRD: 66 ML/MIN/{1.73_M2}
GLUCOSE SERPL-MCNC: 97 MG/DL (ref 70–105)
POTASSIUM SERPL-SCNC: 3.7 MMOL/L (ref 3.5–5.1)
SODIUM SERPL-SCNC: 133 MMOL/L (ref 136–145)

## 2019-01-14 PROCEDURE — 36415 COLL VENOUS BLD VENIPUNCTURE: CPT | Performed by: NURSE PRACTITIONER

## 2019-01-14 PROCEDURE — 80048 BASIC METABOLIC PNL TOTAL CA: CPT | Performed by: NURSE PRACTITIONER

## 2019-01-17 ENCOUNTER — HOSPITAL ENCOUNTER (OUTPATIENT)
Dept: CARDIOLOGY | Facility: CLINIC | Age: 54
Discharge: HOME | End: 2019-01-17
Attending: NURSE PRACTITIONER | Admitting: NURSE PRACTITIONER
Payer: COMMERCIAL

## 2019-01-17 VITALS — DIASTOLIC BLOOD PRESSURE: 67 MMHG | HEART RATE: 52 BPM | SYSTOLIC BLOOD PRESSURE: 111 MMHG

## 2019-01-17 DIAGNOSIS — I25.10 CORONARY ARTERY DISEASE INVOLVING NATIVE CORONARY ARTERY OF NATIVE HEART WITHOUT ANGINA PECTORIS: ICD-10-CM

## 2019-01-17 PROCEDURE — 25000128 H RX IP 250 OP 636: Performed by: NURSE PRACTITIONER

## 2019-01-17 PROCEDURE — 75574 CT ANGIO HRT W/3D IMAGE: CPT

## 2019-01-17 PROCEDURE — 25000132 ZZH RX MED GY IP 250 OP 250 PS 637: Performed by: NURSE PRACTITIONER

## 2019-01-17 PROCEDURE — 75574 CT ANGIO HRT W/3D IMAGE: CPT | Mod: 26 | Performed by: INTERNAL MEDICINE

## 2019-01-17 RX ORDER — IOPAMIDOL 755 MG/ML
150 INJECTION, SOLUTION INTRAVASCULAR ONCE
Status: COMPLETED | OUTPATIENT
Start: 2019-01-17 | End: 2019-01-17

## 2019-01-17 RX ORDER — NITROGLYCERIN 0.4 MG/1
0.4 TABLET SUBLINGUAL
Status: DISCONTINUED | OUTPATIENT
Start: 2019-01-17 | End: 2019-01-18 | Stop reason: HOSPADM

## 2019-01-17 RX ORDER — ACYCLOVIR 200 MG/1
0-1 CAPSULE ORAL
Status: DISCONTINUED | OUTPATIENT
Start: 2019-01-17 | End: 2019-01-18 | Stop reason: HOSPADM

## 2019-01-17 RX ORDER — METOPROLOL TARTRATE 1 MG/ML
5-15 INJECTION, SOLUTION INTRAVENOUS
Status: DISCONTINUED | OUTPATIENT
Start: 2019-01-17 | End: 2019-01-18 | Stop reason: HOSPADM

## 2019-01-17 RX ORDER — METOPROLOL TARTRATE 50 MG
50-100 TABLET ORAL
Status: COMPLETED | OUTPATIENT
Start: 2019-01-17 | End: 2019-01-17

## 2019-01-17 RX ADMIN — NITROGLYCERIN 0.4 MG: 0.4 TABLET SUBLINGUAL at 12:20

## 2019-01-17 RX ADMIN — METOPROLOL TARTRATE 50 MG: 50 TABLET ORAL at 11:10

## 2019-01-17 RX ADMIN — IOPAMIDOL 115 ML: 755 INJECTION, SOLUTION INTRAVENOUS at 12:32

## 2019-01-17 RX ADMIN — SODIUM CHLORIDE 100 ML: 9 INJECTION, SOLUTION INTRAVENOUS at 12:32

## 2019-01-18 ENCOUNTER — DOCUMENTATION ONLY (OUTPATIENT)
Dept: CARDIOLOGY | Facility: CLINIC | Age: 54
End: 2019-01-18

## 2019-01-18 ENCOUNTER — TRANSFERRED RECORDS (OUTPATIENT)
Dept: HEALTH INFORMATION MANAGEMENT | Facility: CLINIC | Age: 54
End: 2019-01-18

## 2019-01-18 DIAGNOSIS — I25.10 CORONARY ARTERY DISEASE INVOLVING NATIVE CORONARY ARTERY OF NATIVE HEART WITHOUT ANGINA PECTORIS: Primary | ICD-10-CM

## 2019-01-18 NOTE — PROGRESS NOTES
I have left message for patient to call me you and let me know when he would be available for me to call him today    Please let me know when he gets back to you   thanks

## 2019-01-18 NOTE — PROGRESS NOTES
I have contacted the patient to explain his CT scan results.  We need a coronary angiogram to assess the right coronary artery for the ulcerated plaque that seems to be present.  We will need to use intravascular ultrasound and OCT.  Dr. Salazar is available to do this procedure next Wednesday and the patient will see me on Tuesday to update his paperwork.  I will check a basic metabolic panel on Tuesday to recheck his renal function.  He agrees.  He plans to check his schedule and contact me shortly with a final plan

## 2019-01-22 ENCOUNTER — OFFICE VISIT (OUTPATIENT)
Dept: CARDIOLOGY | Facility: CLINIC | Age: 54
End: 2019-01-22
Payer: COMMERCIAL

## 2019-01-22 ENCOUNTER — DOCUMENTATION ONLY (OUTPATIENT)
Dept: CARDIOLOGY | Facility: CLINIC | Age: 54
End: 2019-01-22

## 2019-01-22 VITALS
HEART RATE: 53 BPM | DIASTOLIC BLOOD PRESSURE: 67 MMHG | SYSTOLIC BLOOD PRESSURE: 106 MMHG | WEIGHT: 188.3 LBS | HEIGHT: 73 IN | BODY MASS INDEX: 24.96 KG/M2

## 2019-01-22 DIAGNOSIS — I25.10 CORONARY ARTERY DISEASE INVOLVING NATIVE CORONARY ARTERY OF NATIVE HEART WITHOUT ANGINA PECTORIS: ICD-10-CM

## 2019-01-22 LAB
ANION GAP SERPL CALCULATED.3IONS-SCNC: 7.5 MMOL/L (ref 6–17)
BUN SERPL-MCNC: 21 MG/DL (ref 7–30)
CALCIUM SERPL-MCNC: 9.8 MG/DL (ref 8.5–10.5)
CHLORIDE SERPL-SCNC: 105 MMOL/L (ref 98–107)
CO2 SERPL-SCNC: 32 MMOL/L (ref 23–29)
CREAT SERPL-MCNC: 1.2 MG/DL (ref 0.7–1.3)
GFR SERPL CREATININE-BSD FRML MDRD: 64 ML/MIN/{1.73_M2}
GLUCOSE SERPL-MCNC: 90 MG/DL (ref 70–105)
POTASSIUM SERPL-SCNC: 3.5 MMOL/L (ref 3.5–5.1)
SODIUM SERPL-SCNC: 141 MMOL/L (ref 136–145)

## 2019-01-22 PROCEDURE — 80048 BASIC METABOLIC PNL TOTAL CA: CPT | Performed by: INTERNAL MEDICINE

## 2019-01-22 PROCEDURE — 36415 COLL VENOUS BLD VENIPUNCTURE: CPT | Performed by: INTERNAL MEDICINE

## 2019-01-22 PROCEDURE — 99215 OFFICE O/P EST HI 40 MIN: CPT | Performed by: NURSE PRACTITIONER

## 2019-01-22 RX ORDER — POTASSIUM CHLORIDE 1500 MG/1
20 TABLET, EXTENDED RELEASE ORAL
Status: CANCELLED | OUTPATIENT
Start: 2019-01-22

## 2019-01-22 RX ORDER — ASPIRIN 81 MG/1
81 TABLET ORAL DAILY
Status: CANCELLED | OUTPATIENT
Start: 2019-01-22

## 2019-01-22 RX ORDER — SODIUM CHLORIDE 9 MG/ML
INJECTION, SOLUTION INTRAVENOUS CONTINUOUS
Status: CANCELLED | OUTPATIENT
Start: 2019-01-22

## 2019-01-22 RX ORDER — LIDOCAINE 40 MG/G
CREAM TOPICAL
Status: CANCELLED | OUTPATIENT
Start: 2019-01-22

## 2019-01-22 ASSESSMENT — MIFFLIN-ST. JEOR: SCORE: 1758

## 2019-01-22 NOTE — LETTER
"1/22/2019    Josué Vaughn MD  [facility] [address]  [facility] [address]    RE: Braulio Bunch       Dear Colleague,    I had the pleasure of seeing Braulio Bunch in the [facility].    History of Present Illness:     Braulio Bunch is a 53 year old male followed here by Dr. Carter who presents for follow up today and to review his echocardiogram.  We met him May 10th, 2018 when he presented with a STEMI.  He has a history of dyslipidemia and had stopped simvastatin for 4 years.  He saw his primary care prior to his MI and went down to his basement and began to do the \"insanity workout\" as he was advised to have lifestyle changes.  He began to have chest pain and was found to have an inferior wall MI while being transported to [facility] by ambulance.  He had a ventricular fibrillation arrest on the way to the Cath Lab.  He was shocked back to sinus rhythm but had torsades directly prior to the angiogram as well. He underwent a 4.0 x 60 mm Synergy drug-eluting stent into his occluded ostial OM 2 branch with jailing of OM-1 with PTCA.  He had a 30% LAD stenosis and the right coronary had mild irregularities     He went on to have atypical chest discomfort afterwards which was treated with Ranexa and colchicine.  These have been discontinued.  He had undergone ischemic workups since his stents with no evidence of ischemia by stress echo or nuclear scans. On nuclear scan in July, 2018 his infero, inferolateral MI was apparent with very mild martha-infarct ischemia.    LVEF by LV gram was 45% and increased to 50-55% on echocardiogram; Recent echocardiogram reveals EF of 55-60% with mild RV dilatation. Lateral, inferior and anterior RWMA appeared more prominent on this exam. I will ask Dr. Carter to review and he felt anterior wall was ok but WMA were more prominent in lateral and inferior walls. This prompted a  CTA and circumflex stents appeared patent. The " "RCA appeared to have a ulcer crater/vulnerable plaque in the mid RCA with only mild stenosis at that area; trivial plaque was noted in the remainder of the RCA.    This finding was discussed with Dr. Carter who suggested a coronary angiogram with OCT and IVUS. The patient is here today for cardiac assessment for coronary angiogram.     He was placed on Crestor; LDL is now 61 HDL 46 triglyceride 67.  His creatinine was slightly elevated after his angiogram to 1.4 it is back to 1.27. Pre CT scan creatinine was 1.16 and post 1.2.     He had \"brain fog\" on Coreg he is now on low-dose metoprolol.  He does have Ryanaud's phenomenon with mild finger symptoms this winter on Metoprolol. His only complaint is mild fatigue. He wishes to continue Metoprolol for now but if Raynaud's worsens, we can change to Bystolic. He continues to work out twice daily with no symptoms.     He has a history of PVCs in cardiac rehab.  A Ziopatch monitor was done showing accelerated idioventricular rhythm in the middle of night with some bradycardia during the night.  He denies any significant snoring history as does his wife.Overall these were reviewed by Dr. Carter and given the normal LV function we did not pursue an EP consult. He has recently worn an overnight oximetry to screen for sleep apnea and I will try to obtain those results.      Exam in unremarkable and outlined below.           Impression/Plan:      1.  Coronary artery disease withST segment elevation myocardial infarction in May of this year treated with drug-eluting stent to the circumflex  Now with more prominent WMA in inferolateral walls  -possible crater/ulcerated plaque noted in RCA on CTA  -He had non-flow-limiting disease elsewhere on invasive angiogram  -He has preserved LV function with low blood pressures therefore is not on lisinopril  -Continue Brilinta until May 10, 2019 or longer if he is stented tomrrow  -plan for angiogram tomorrow with IVUS/OCT  Risks and " benefits of left heart catheterization and coronary angiogram were discussed with the patient in detail. 0.1-0.3% (for diagnostic angio) and 1-2% (for PCI)  risk of stroke, MI, death, emergent bypass for diagnostic angio, risk of contrast induced allergic reaction, renal dysfunction, vascular complications were discussed. Pros and cons of bare metal vs drug eluting stents was discussed. Patient understands and wishes to proceed with it.  -I have scheduled him for follow up with me and a BMP one week later     2.  Ventricular fibrillation arrest on the way to the Cath Lab with intermittent torsades after he was defibrillated.  -No further arrhythmias  -Ziopatch was unremarkable  -Continue beta-blockers      3.  Dyslipidemia controlled  -continue Crestor 20 mg a day  -recheck labs Spring     4.  Vitamin D deficiency on replacement through primary care     5. Mild kidney injury with chronic kidney disease post angiography.      -Baseline creatinine 1.13 peak 1.4 after invasive angiogram  -creat pre CT 1.16; post CT 1.2  -see me in 5 days post angiogram with BMP     Remain off lisinopril     6. Raynaud's phenomenon-if symptoms worsen on toprol this winter, consider change to low dose Bystolic as intolerant to coreg as noted above.      7. Screening for sleep apnea with overnight oximetry  -results unremarkable--pt notified after the visit      It has been a pleasure seeing Braulio Bunch in follow up  Greater than 50% of this 45 minute visit was spent in counseling    Floresita Tyson, MSN, APRN-BC, CNP  Cardiology    No orders of the defined types were placed in this encounter.    No orders of the defined types were placed in this encounter.    There are no discontinued medications.      No diagnosis found.    CURRENT MEDICATIONS:  Current Outpatient Medications   Medication Sig Dispense Refill     aspirin 81 MG EC tablet Take 1 tablet (81 mg) by mouth daily 30 tablet 11     metoprolol succinate (TOPROL-XL) 25 MG  24 hr tablet Take 1 tablet (25 mg) by mouth At Bedtime 90 tablet 3     nitroGLYcerin (NITROSTAT) 0.4 MG sublingual tablet For chest pain place 1 tablet under the tongue every 5 minutes for 3 doses. If symptoms persist 5 minutes after 1st dose call 911. 25 tablet 3     rosuvastatin (CRESTOR) 20 MG tablet Take 1 tablet (20 mg) by mouth daily 90 tablet 3     ticagrelor (BRILINTA) 90 MG tablet Take 1 tablet (90 mg) by mouth every 12 hours 180 tablet 3     VITAMIN D, CHOLECALCIFEROL, PO Take 1,000 Units by mouth daily          ALLERGIES     Allergies   Allergen Reactions     Augmentin      Coreg [Carvedilol]      Brain fog       PAST MEDICAL HISTORY:  Past Medical History:   Diagnosis Date     Acute myocardial infarction (H) 5/13/2018     CAD (coronary artery disease)     Cath 5/10/18- PCI with revascularization to OM- EVELYN placed; jailing of OM-1--dilated lad 30%; mild disease elsewhere     Hyperlipidemia      Kidney calculi      Migraine      STEMI (ST elevation myocardial infarction) (H) 5/10/2018     Ventricular fibrillation (H)     5/10/18 in ER     Vitamin D deficiency        PAST SURGICAL HISTORY:  Past Surgical History:   Procedure Laterality Date     PARATHYROIDECTOMY         FAMILY HISTORY:  Family History   Problem Relation Age of Onset     Arrhythmia Mother      Hyperlipidemia Mother      Hypertension Father      Myocardial Infarction Father 81       SOCIAL HISTORY:  Social History     Socioeconomic History     Marital status:      Spouse name: Not on file     Number of children: Not on file     Years of education: Not on file     Highest education level: Not on file   Social Needs     Financial resource strain: Not on file     Food insecurity - worry: Not on file     Food insecurity - inability: Not on file     Transportation needs - medical: Not on file     Transportation needs - non-medical: Not on file   Occupational History     Not on file   Tobacco Use     Smoking status: Never Smoker      Smokeless tobacco: Never Used   Substance and Sexual Activity     Alcohol use: Yes     Comment: rare     Drug use: Not on file     Sexual activity: Not on file   Other Topics Concern     Parent/sibling w/ CABG, MI or angioplasty before 65F 55M? No   Social History Narrative     Not on file       Review of Systems:  Skin:          Eyes:         ENT:         Respiratory:          Cardiovascular:         Gastroenterology:        Genitourinary:         Musculoskeletal:         Neurologic:         Psychiatric:         Heme/Lymph/Imm:         Endocrine:           Physical Exam:  Vitals: There were no vitals taken for this visit.    Constitutional:           Skin:             Head:           Eyes:           Lymph:      ENT:           Neck:           Respiratory:            Cardiac:                                                           GI:           Extremities and Muscular Skeletal:                 Neurological:           Psych:         Recent Lab Results:  LIPID RESULTS:  Lab Results   Component Value Date    CHOL 120 07/30/2018    HDL 46 07/30/2018    LDL 61 07/30/2018    TRIG 67 07/30/2018       LIVER ENZYME RESULTS:  Lab Results   Component Value Date    ALT <5 (L) 07/30/2018       CBC RESULTS:  Lab Results   Component Value Date    WBC 10.0 05/11/2018    RBC 4.58 05/11/2018    HGB 13.7 05/11/2018    HCT 39.6 (L) 05/11/2018    MCV 87 05/11/2018    MCH 29.9 05/11/2018    MCHC 34.6 05/11/2018    RDW 12.5 05/11/2018     05/11/2018       BMP RESULTS:  Lab Results   Component Value Date     (L) 01/14/2019    POTASSIUM 3.7 01/14/2019    CHLORIDE 103 01/14/2019    CO2 31 (H) 01/14/2019    ANIONGAP 2.7 (L) 01/14/2019    GLC 97 01/14/2019    BUN 17 01/14/2019    CR 1.16 01/14/2019    GFRESTIMATED 66 01/14/2019    GFRESTBLACK 80 01/14/2019    KELY 9.4 01/14/2019        A1C RESULTS:  No results found for: A1C    INR RESULTS:  No results found for: INR        CC  Floresita Tyson, APRN CNP  [address]  [address]  [place] [address]                  Thank you for allowing me to participate in the care of your patient.      Sincerely,     ENRIKE Crocker CNP     [facility]    cc:   ENRIKE Crocker CNP  [address]  [place] [id number]

## 2019-01-22 NOTE — PROGRESS NOTES
Received VM from pt requesting return call on his cell phone with overnight oximetry report.      Called pt, no KIMBERLEE biran stating SAYRA Truong reviewed overnight oximetry report that showed no significant desaturations and lowest his O2 level got throughout the night was 90%.  Left callback number and advised he call with any questions/concerns.    GREGORIO Childs 1:08 PM 1/22/2019

## 2019-01-22 NOTE — PROGRESS NOTES
"History of Present Illness:     Braulio Bunch is a 53 year old male followed here by Dr. Carter who presents for follow up today and to review his echocardiogram.  We met him May 10th, 2018 when he presented with a STEMI.  He has a history of dyslipidemia and had stopped simvastatin for 4 years.  He saw his primary care prior to his MI and went down to his basement and began to do the \"insanity workout\" as he was advised to have lifestyle changes.  He began to have chest pain and was found to have an inferior wall MI while being transported to [facility] by ambulance.  He had a ventricular fibrillation arrest on the way to the Cath Lab.  He was shocked back to sinus rhythm but had torsades directly prior to the angiogram as well. He underwent a 4.0 x 60 mm Synergy drug-eluting stent into his occluded ostial OM 2 branch with jailing of OM-1 with PTCA.  He had a 30% LAD stenosis and the right coronary had mild irregularities     He went on to have atypical chest discomfort afterwards which was treated with Ranexa and colchicine.  These have been discontinued.  He had undergone ischemic workups since his stents with no evidence of ischemia by stress echo or nuclear scans. On nuclear scan in July, 2018 his infero, inferolateral MI was apparent with very mild martha-infarct ischemia.    LVEF by LV gram was 45% and increased to 50-55% on echocardiogram; Recent echocardiogram reveals EF of 55-60% with mild RV dilatation. Lateral, inferior and anterior RWMA appeared more prominent on this exam. I will ask Dr. Carter to review and he felt anterior wall was ok but WMA were more prominent in lateral and inferior walls. This prompted a  CTA and circumflex stents appeared patent. The RCA appeared to have a ulcer crater/vulnerable plaque in the mid RCA with only mild stenosis at that area; trivial plaque was noted in the remainder of the RCA.    This finding was discussed with Dr. Carter who suggested a coronary " "angiogram with OCT and IVUS. The patient is here today for cardiac assessment for coronary angiogram.     He was placed on Crestor; LDL is now 61 HDL 46 triglyceride 67.  His creatinine was slightly elevated after his angiogram to 1.4 it is back to 1.27. Pre CT scan creatinine was 1.16 and post 1.2.     He had \"brain fog\" on Coreg he is now on low-dose metoprolol.  He does have Ryanaud's phenomenon with mild finger symptoms this winter on Metoprolol. His only complaint is mild fatigue. He wishes to continue Metoprolol for now but if Raynaud's worsens, we can change to Bystolic. He continues to work out twice daily with no symptoms.     He has a history of PVCs in cardiac rehab.  A Ziopatch monitor was done showing accelerated idioventricular rhythm in the middle of night with some bradycardia during the night.  He denies any significant snoring history as does his wife.Overall these were reviewed by Dr. Carter and given the normal LV function we did not pursue an EP consult. He has recently worn an overnight oximetry to screen for sleep apnea and I will try to obtain those results.      Exam in unremarkable and outlined below.           Impression/Plan:      1.  Coronary artery disease withST segment elevation myocardial infarction in May of this year treated with drug-eluting stent to the circumflex  Now with more prominent WMA in inferolateral walls  -possible crater/ulcerated plaque noted in RCA on CTA  -He had non-flow-limiting disease elsewhere on invasive angiogram  -He has preserved LV function with low blood pressures therefore is not on lisinopril  -Continue Brilinta until May 10, 2019 or longer if he is stented tomrrow  -plan for angiogram tomorrow with IVUS/OCT  Risks and benefits of left heart catheterization and coronary angiogram were discussed with the patient in detail. 0.1-0.3% (for diagnostic angio) and 1-2% (for PCI)  risk of stroke, MI, death, emergent bypass for diagnostic angio, risk of " contrast induced allergic reaction, renal dysfunction, vascular complications were discussed. Pros and cons of bare metal vs drug eluting stents was discussed. Patient understands and wishes to proceed with it.  -I have scheduled him for follow up with me and a BMP one week later     2.  Ventricular fibrillation arrest on the way to the Cath Lab with intermittent torsades after he was defibrillated.  -No further arrhythmias  -Ziopatch was unremarkable  -Continue beta-blockers      3.  Dyslipidemia controlled  -continue Crestor 20 mg a day  -recheck labs Spring     4.  Vitamin D deficiency on replacement through primary care     5. Mild kidney injury with chronic kidney disease post angiography.      -Baseline creatinine 1.13 peak 1.4 after invasive angiogram  -creat pre CT 1.16; post CT 1.2  -see me in 5 days post angiogram with BMP     Remain off lisinopril     6. Raynaud's phenomenon-if symptoms worsen on toprol this winter, consider change to low dose Bystolic as intolerant to coreg as noted above.      7. Screening for sleep apnea with overnight oximetry  -results unremarkable--pt notified after the visit      It has been a pleasure seeing Braulio Bunch in follow up  Greater than 50% of this 45 minute visit was spent in counseling    Floresita Tyson, MSN, APRN-BC, CNP  Cardiology    No orders of the defined types were placed in this encounter.    No orders of the defined types were placed in this encounter.    There are no discontinued medications.      No diagnosis found.    CURRENT MEDICATIONS:  Current Outpatient Medications   Medication Sig Dispense Refill     aspirin 81 MG EC tablet Take 1 tablet (81 mg) by mouth daily 30 tablet 11     metoprolol succinate (TOPROL-XL) 25 MG 24 hr tablet Take 1 tablet (25 mg) by mouth At Bedtime 90 tablet 3     nitroGLYcerin (NITROSTAT) 0.4 MG sublingual tablet For chest pain place 1 tablet under the tongue every 5 minutes for 3 doses. If symptoms persist 5 minutes  after 1st dose call 911. 25 tablet 3     rosuvastatin (CRESTOR) 20 MG tablet Take 1 tablet (20 mg) by mouth daily 90 tablet 3     ticagrelor (BRILINTA) 90 MG tablet Take 1 tablet (90 mg) by mouth every 12 hours 180 tablet 3     VITAMIN D, CHOLECALCIFEROL, PO Take 1,000 Units by mouth daily          ALLERGIES     Allergies   Allergen Reactions     Augmentin      Coreg [Carvedilol]      Brain fog       PAST MEDICAL HISTORY:  Past Medical History:   Diagnosis Date     Acute myocardial infarction (H) 5/13/2018     CAD (coronary artery disease)     Cath 5/10/18- PCI with revascularization to OM- EVELYN placed; jailing of OM-1--dilated lad 30%; mild disease elsewhere     Hyperlipidemia      Kidney calculi      Migraine      STEMI (ST elevation myocardial infarction) (H) 5/10/2018     Ventricular fibrillation (H)     5/10/18 in ER     Vitamin D deficiency        PAST SURGICAL HISTORY:  Past Surgical History:   Procedure Laterality Date     PARATHYROIDECTOMY         FAMILY HISTORY:  Family History   Problem Relation Age of Onset     Arrhythmia Mother      Hyperlipidemia Mother      Hypertension Father      Myocardial Infarction Father 81       SOCIAL HISTORY:  Social History     Socioeconomic History     Marital status:      Spouse name: Not on file     Number of children: Not on file     Years of education: Not on file     Highest education level: Not on file   Social Needs     Financial resource strain: Not on file     Food insecurity - worry: Not on file     Food insecurity - inability: Not on file     Transportation needs - medical: Not on file     Transportation needs - non-medical: Not on file   Occupational History     Not on file   Tobacco Use     Smoking status: Never Smoker     Smokeless tobacco: Never Used   Substance and Sexual Activity     Alcohol use: Yes     Comment: rare     Drug use: Not on file     Sexual activity: Not on file   Other Topics Concern     Parent/sibling w/ CABG, MI or angioplasty before  65F 55M? No   Social History Narrative     Not on file       Review of Systems:  Skin:          Eyes:         ENT:         Respiratory:          Cardiovascular:         Gastroenterology:        Genitourinary:         Musculoskeletal:         Neurologic:         Psychiatric:         Heme/Lymph/Imm:         Endocrine:           Physical Exam:  Vitals: There were no vitals taken for this visit.    Constitutional:           Skin:             Head:           Eyes:           Lymph:      ENT:           Neck:           Respiratory:            Cardiac:                                                           GI:           Extremities and Muscular Skeletal:                 Neurological:           Psych:         Recent Lab Results:  LIPID RESULTS:  Lab Results   Component Value Date    CHOL 120 07/30/2018    HDL 46 07/30/2018    LDL 61 07/30/2018    TRIG 67 07/30/2018       LIVER ENZYME RESULTS:  Lab Results   Component Value Date    ALT <5 (L) 07/30/2018       CBC RESULTS:  Lab Results   Component Value Date    WBC 10.0 05/11/2018    RBC 4.58 05/11/2018    HGB 13.7 05/11/2018    HCT 39.6 (L) 05/11/2018    MCV 87 05/11/2018    MCH 29.9 05/11/2018    MCHC 34.6 05/11/2018    RDW 12.5 05/11/2018     05/11/2018       BMP RESULTS:  Lab Results   Component Value Date     (L) 01/14/2019    POTASSIUM 3.7 01/14/2019    CHLORIDE 103 01/14/2019    CO2 31 (H) 01/14/2019    ANIONGAP 2.7 (L) 01/14/2019    GLC 97 01/14/2019    BUN 17 01/14/2019    CR 1.16 01/14/2019    GFRESTIMATED 66 01/14/2019    GFRESTBLACK 80 01/14/2019    KELY 9.4 01/14/2019        A1C RESULTS:  No results found for: A1C    INR RESULTS:  No results found for: INR        CC  Floresita Tyson, APRN CNP  [address]  [place] [address]

## 2019-01-22 NOTE — LETTER
"1/22/2019    Josué Vaughn MD  [facility] [address]  [facility] [address]    RE: Braulio Bunch       Dear Colleague,    I had the pleasure of seeing Braulio Bunch in the [facility].    History of Present Illness:     Braulio Bunch is a 53 year old male followed here by Dr. Carter who presents for follow up today and to review his echocardiogram.  We met him May 10th, 2018 when he presented with a STEMI.  He has a history of dyslipidemia and had stopped simvastatin for 4 years.  He saw his primary care prior to his MI and went down to his basement and began to do the \"insanity workout\" as he was advised to have lifestyle changes.  He began to have chest pain and was found to have an inferior wall MI while being transported to [facility] by ambulance.  He had a ventricular fibrillation arrest on the way to the Cath Lab.  He was shocked back to sinus rhythm but had torsades directly prior to the angiogram as well. He underwent a 4.0 x 60 mm Synergy drug-eluting stent into his occluded ostial OM 2 branch with jailing of OM-1 with PTCA.  He had a 30% LAD stenosis and the right coronary had mild irregularities     He went on to have atypical chest discomfort afterwards which was treated with Ranexa and colchicine.  These have been discontinued.  He had undergone ischemic workups since his stents with no evidence of ischemia by stress echo or nuclear scans. On nuclear scan in July, 2018 his infero, inferolateral MI was apparent with very mild martha-infarct ischemia.    LVEF by LV gram was 45% and increased to 50-55% on echocardiogram; Recent echocardiogram reveals EF of 55-60% with mild RV dilatation. Lateral, inferior and anterior RWMA appeared more prominent on this exam. I will ask Dr. Carter to review and he felt anterior wall was ok but WMA were more prominent in lateral and inferior walls. This prompted a  CTA and circumflex stents appeared patent. The " "RCA appeared to have a ulcer crater/vulnerable plaque in the mid RCA with only mild stenosis at that area; trivial plaque was noted in the remainder of the RCA.    This finding was discussed with Dr. Carter who suggested a coronary angiogram with OCT and IVUS. The patient is here today for cardiac assessment for coronary angiogram.     He was placed on Crestor; LDL is now 61 HDL 46 triglyceride 67.  His creatinine was slightly elevated after his angiogram to 1.4 it is back to 1.27. Pre CT scan creatinine was 1.16 and post 1.2.     He had \"brain fog\" on Coreg he is now on low-dose metoprolol.  He does have Ryanaud's phenomenon with mild finger symptoms this winter on Metoprolol. His only complaint is mild fatigue. He wishes to continue Metoprolol for now but if Raynaud's worsens, we can change to Bystolic. He continues to work out twice daily with no symptoms.     He has a history of PVCs in cardiac rehab.  A Ziopatch monitor was done showing accelerated idioventricular rhythm in the middle of night with some bradycardia during the night.  He denies any significant snoring history as does his wife.Overall these were reviewed by Dr. Carter and given the normal LV function we did not pursue an EP consult. He has recently worn an overnight oximetry to screen for sleep apnea and I will try to obtain those results.      Exam in unremarkable and outlined below.           Impression/Plan:      1.  Coronary artery disease withST segment elevation myocardial infarction in May of this year treated with drug-eluting stent to the circumflex  Now with more prominent WMA in inferolateral walls  -possible crater/ulcerated plaque noted in RCA on CTA  -He had non-flow-limiting disease elsewhere on invasive angiogram  -He has preserved LV function with low blood pressures therefore is not on lisinopril  -Continue Brilinta until May 10, 2019 or longer if he is stented tomrrow  -plan for angiogram tomorrow with IVUS/OCT  Risks and " benefits of left heart catheterization and coronary angiogram were discussed with the patient in detail. 0.1-0.3% (for diagnostic angio) and 1-2% (for PCI)  risk of stroke, MI, death, emergent bypass for diagnostic angio, risk of contrast induced allergic reaction, renal dysfunction, vascular complications were discussed. Pros and cons of bare metal vs drug eluting stents was discussed. Patient understands and wishes to proceed with it.  -I have scheduled him for follow up with me and a BMP one week later     2.  Ventricular fibrillation arrest on the way to the Cath Lab with intermittent torsades after he was defibrillated.  -No further arrhythmias  -Ziopatch was unremarkable  -Continue beta-blockers      3.  Dyslipidemia controlled  -continue Crestor 20 mg a day  -recheck labs Spring     4.  Vitamin D deficiency on replacement through primary care     5. Mild kidney injury with chronic kidney disease post angiography.      -Baseline creatinine 1.13 peak 1.4 after invasive angiogram  -creat pre CT 1.16; post CT 1.2  -see me in 5 days post angiogram with BMP     Remain off lisinopril     6. Raynaud's phenomenon-if symptoms worsen on toprol this winter, consider change to low dose Bystolic as intolerant to coreg as noted above.      7. Screening for sleep apnea with overnight oximetry  -results unremarkable--pt notified after the visit      It has been a pleasure seeing Braulio Bunch in follow up  Greater than 50% of this 45 minute visit was spent in counseling    Floresita Tyson, MSN, APRN-BC, CNP  Cardiology    No orders of the defined types were placed in this encounter.    No orders of the defined types were placed in this encounter.    There are no discontinued medications.      No diagnosis found.    CURRENT MEDICATIONS:  Current Outpatient Medications   Medication Sig Dispense Refill     aspirin 81 MG EC tablet Take 1 tablet (81 mg) by mouth daily 30 tablet 11     metoprolol succinate (TOPROL-XL) 25 MG  24 hr tablet Take 1 tablet (25 mg) by mouth At Bedtime 90 tablet 3     nitroGLYcerin (NITROSTAT) 0.4 MG sublingual tablet For chest pain place 1 tablet under the tongue every 5 minutes for 3 doses. If symptoms persist 5 minutes after 1st dose call 911. 25 tablet 3     rosuvastatin (CRESTOR) 20 MG tablet Take 1 tablet (20 mg) by mouth daily 90 tablet 3     ticagrelor (BRILINTA) 90 MG tablet Take 1 tablet (90 mg) by mouth every 12 hours 180 tablet 3     VITAMIN D, CHOLECALCIFEROL, PO Take 1,000 Units by mouth daily          ALLERGIES     Allergies   Allergen Reactions     Augmentin      Coreg [Carvedilol]      Brain fog       PAST MEDICAL HISTORY:  Past Medical History:   Diagnosis Date     Acute myocardial infarction (H) 5/13/2018     CAD (coronary artery disease)     Cath 5/10/18- PCI with revascularization to OM- EVELYN placed; jailing of OM-1--dilated lad 30%; mild disease elsewhere     Hyperlipidemia      Kidney calculi      Migraine      STEMI (ST elevation myocardial infarction) (H) 5/10/2018     Ventricular fibrillation (H)     5/10/18 in ER     Vitamin D deficiency        PAST SURGICAL HISTORY:  Past Surgical History:   Procedure Laterality Date     PARATHYROIDECTOMY         FAMILY HISTORY:  Family History   Problem Relation Age of Onset     Arrhythmia Mother      Hyperlipidemia Mother      Hypertension Father      Myocardial Infarction Father 81       SOCIAL HISTORY:  Social History     Socioeconomic History     Marital status:      Spouse name: Not on file     Number of children: Not on file     Years of education: Not on file     Highest education level: Not on file   Social Needs     Financial resource strain: Not on file     Food insecurity - worry: Not on file     Food insecurity - inability: Not on file     Transportation needs - medical: Not on file     Transportation needs - non-medical: Not on file   Occupational History     Not on file   Tobacco Use     Smoking status: Never Smoker      Smokeless tobacco: Never Used   Substance and Sexual Activity     Alcohol use: Yes     Comment: rare     Drug use: Not on file     Sexual activity: Not on file   Other Topics Concern     Parent/sibling w/ CABG, MI or angioplasty before 65F 55M? No   Social History Narrative     Not on file       Review of Systems:  Skin:          Eyes:         ENT:         Respiratory:          Cardiovascular:         Gastroenterology:        Genitourinary:         Musculoskeletal:         Neurologic:         Psychiatric:         Heme/Lymph/Imm:         Endocrine:           Physical Exam:  Vitals: There were no vitals taken for this visit.    Constitutional:           Skin:             Head:           Eyes:           Lymph:      ENT:           Neck:           Respiratory:            Cardiac:                                                           GI:           Extremities and Muscular Skeletal:                 Neurological:           Psych:         Recent Lab Results:  LIPID RESULTS:  Lab Results   Component Value Date    CHOL 120 07/30/2018    HDL 46 07/30/2018    LDL 61 07/30/2018    TRIG 67 07/30/2018       LIVER ENZYME RESULTS:  Lab Results   Component Value Date    ALT <5 (L) 07/30/2018       CBC RESULTS:  Lab Results   Component Value Date    WBC 10.0 05/11/2018    RBC 4.58 05/11/2018    HGB 13.7 05/11/2018    HCT 39.6 (L) 05/11/2018    MCV 87 05/11/2018    MCH 29.9 05/11/2018    MCHC 34.6 05/11/2018    RDW 12.5 05/11/2018     05/11/2018       BMP RESULTS:  Lab Results   Component Value Date     (L) 01/14/2019    POTASSIUM 3.7 01/14/2019    CHLORIDE 103 01/14/2019    CO2 31 (H) 01/14/2019    ANIONGAP 2.7 (L) 01/14/2019    GLC 97 01/14/2019    BUN 17 01/14/2019    CR 1.16 01/14/2019    GFRESTIMATED 66 01/14/2019    GFRESTBLACK 80 01/14/2019    KELY 9.4 01/14/2019        A1C RESULTS:  No results found for: A1C    INR RESULTS:  No results found for: INR        Thank you for allowing me to participate in the care of  your patient.    Sincerely,     ENRIKE Crocker [facility]

## 2019-01-22 NOTE — PROGRESS NOTES
Can you see if [facility] has his overnight oximetry report available     He turned it in last week I think    Thanks  Floresita

## 2019-01-22 NOTE — PROGRESS NOTES
Overnight oximetry report dated 1/18/19 scanned in [facility] and reviewed with DTK. Report shows no significant desaturation note. Pt's minimal oximetry throughout the night was 90%. Per DTK, call pt with results.     Called pt to review results, no answer. LVM to call back to review. GREGORIO Norton 10:46 AM 01/22/19

## 2019-01-22 NOTE — PROGRESS NOTES
Angiogram pre prep not done by Nurse Pt did see NP for OV today and NP said she went over everything with Pt. RICHIE Mclaughlin RN

## 2019-01-23 ENCOUNTER — HOSPITAL ENCOUNTER (OUTPATIENT)
Facility: CLINIC | Age: 54
Discharge: HOME | End: 2019-01-23
Admitting: INTERNAL MEDICINE
Payer: COMMERCIAL

## 2019-01-23 ENCOUNTER — SURGERY (OUTPATIENT)
Age: 54
End: 2019-01-23
Payer: COMMERCIAL

## 2019-01-23 VITALS
BODY MASS INDEX: 24.96 KG/M2 | SYSTOLIC BLOOD PRESSURE: 114 MMHG | HEIGHT: 73 IN | HEART RATE: 46 BPM | RESPIRATION RATE: 16 BRPM | TEMPERATURE: 97 F | DIASTOLIC BLOOD PRESSURE: 72 MMHG | WEIGHT: 188.3 LBS | OXYGEN SATURATION: 96 %

## 2019-01-23 DIAGNOSIS — I25.10 CORONARY ARTERY DISEASE INVOLVING NATIVE CORONARY ARTERY OF NATIVE HEART WITHOUT ANGINA PECTORIS: ICD-10-CM

## 2019-01-23 DIAGNOSIS — I25.118 ATHEROSCLEROSIS OF NATIVE CORONARY ARTERY OF NATIVE HEART WITH STABLE ANGINA PECTORIS (H): ICD-10-CM

## 2019-01-23 DIAGNOSIS — Z98.890 STATUS POST CORONARY ANGIOGRAM: ICD-10-CM

## 2019-01-23 LAB
APTT PPP: 27 SEC (ref 22–37)
ERYTHROCYTE [DISTWIDTH] IN BLOOD BY AUTOMATED COUNT: 12.7 % (ref 10–15)
HCT VFR BLD AUTO: 43.6 % (ref 40–53)
HGB BLD-MCNC: 14.9 G/DL (ref 13.3–17.7)
INR PPP: 1.04 (ref 0.86–1.14)
MCH RBC QN AUTO: 30.1 PG (ref 26.5–33)
MCHC RBC AUTO-ENTMCNC: 34.2 G/DL (ref 31.5–36.5)
MCV RBC AUTO: 88 FL (ref 78–100)
PLATELET # BLD AUTO: 225 10E9/L (ref 150–450)
RBC # BLD AUTO: 4.95 10E12/L (ref 4.4–5.9)
WBC # BLD AUTO: 4.8 10E9/L (ref 4–11)

## 2019-01-23 PROCEDURE — 99152 MOD SED SAME PHYS/QHP 5/>YRS: CPT | Performed by: INTERNAL MEDICINE

## 2019-01-23 PROCEDURE — 99153 MOD SED SAME PHYS/QHP EA: CPT | Performed by: INTERNAL MEDICINE

## 2019-01-23 PROCEDURE — 85730 THROMBOPLASTIN TIME PARTIAL: CPT

## 2019-01-23 PROCEDURE — 93005 ELECTROCARDIOGRAM TRACING: CPT

## 2019-01-23 PROCEDURE — C1760 CLOSURE DEV, VASC: HCPCS | Performed by: INTERNAL MEDICINE

## 2019-01-23 PROCEDURE — 40000065 ZZH STATISTIC EKG NON-CHARGEABLE

## 2019-01-23 PROCEDURE — 92978 ENDOLUMINL IVUS OCT C 1ST: CPT | Performed by: INTERNAL MEDICINE

## 2019-01-23 PROCEDURE — 25000132 ZZH RX MED GY IP 250 OP 250 PS 637: Performed by: INTERNAL MEDICINE

## 2019-01-23 PROCEDURE — C1769 GUIDE WIRE: HCPCS | Performed by: INTERNAL MEDICINE

## 2019-01-23 PROCEDURE — 40000235 ZZH STATISTIC TELEMETRY

## 2019-01-23 PROCEDURE — 25000128 H RX IP 250 OP 636: Performed by: INTERNAL MEDICINE

## 2019-01-23 PROCEDURE — 25000125 ZZHC RX 250: Performed by: INTERNAL MEDICINE

## 2019-01-23 PROCEDURE — 36415 COLL VENOUS BLD VENIPUNCTURE: CPT

## 2019-01-23 PROCEDURE — C1887 CATHETER, GUIDING: HCPCS | Performed by: INTERNAL MEDICINE

## 2019-01-23 PROCEDURE — 85610 PROTHROMBIN TIME: CPT

## 2019-01-23 PROCEDURE — 93458 L HRT ARTERY/VENTRICLE ANGIO: CPT | Mod: 26 | Performed by: INTERNAL MEDICINE

## 2019-01-23 PROCEDURE — 93010 ELECTROCARDIOGRAM REPORT: CPT | Performed by: INTERNAL MEDICINE

## 2019-01-23 PROCEDURE — 27210794 ZZH OR GENERAL SUPPLY STERILE: Performed by: INTERNAL MEDICINE

## 2019-01-23 PROCEDURE — 93458 L HRT ARTERY/VENTRICLE ANGIO: CPT | Performed by: INTERNAL MEDICINE

## 2019-01-23 PROCEDURE — 40000852 ZZH STATISTIC HEART CATH LAB OR EP LAB

## 2019-01-23 PROCEDURE — 85027 COMPLETE CBC AUTOMATED: CPT

## 2019-01-23 PROCEDURE — 92978 ENDOLUMINL IVUS OCT C 1ST: CPT | Mod: 26 | Performed by: INTERNAL MEDICINE

## 2019-01-23 PROCEDURE — 93452 LEFT HRT CATH W/VENTRCLGRPHY: CPT | Performed by: INTERNAL MEDICINE

## 2019-01-23 RX ORDER — MORPHINE SULFATE 2 MG/ML
1-2 INJECTION, SOLUTION INTRAMUSCULAR; INTRAVENOUS EVERY 5 MIN PRN
Status: DISCONTINUED | OUTPATIENT
Start: 2019-01-23 | End: 2019-01-23 | Stop reason: HOSPADM

## 2019-01-23 RX ORDER — ASPIRIN 325 MG
325 TABLET ORAL
Status: DISCONTINUED | OUTPATIENT
Start: 2019-01-23 | End: 2019-01-23 | Stop reason: HOSPADM

## 2019-01-23 RX ORDER — PROPOFOL 10 MG/ML
10-20 INJECTION, EMULSION INTRAVENOUS EVERY 30 MIN PRN
Status: DISCONTINUED | OUTPATIENT
Start: 2019-01-23 | End: 2019-01-23 | Stop reason: HOSPADM

## 2019-01-23 RX ORDER — NIFEDIPINE 10 MG/1
10 CAPSULE ORAL
Status: DISCONTINUED | OUTPATIENT
Start: 2019-01-23 | End: 2019-01-23 | Stop reason: HOSPADM

## 2019-01-23 RX ORDER — DOPAMINE HYDROCHLORIDE 160 MG/100ML
2-20 INJECTION, SOLUTION INTRAVENOUS CONTINUOUS PRN
Status: DISCONTINUED | OUTPATIENT
Start: 2019-01-23 | End: 2019-01-23 | Stop reason: HOSPADM

## 2019-01-23 RX ORDER — LIDOCAINE HYDROCHLORIDE 10 MG/ML
1-10 INJECTION, SOLUTION EPIDURAL; INFILTRATION; INTRACAUDAL; PERINEURAL
Status: COMPLETED | OUTPATIENT
Start: 2019-01-23 | End: 2019-01-23

## 2019-01-23 RX ORDER — DIPHENHYDRAMINE HYDROCHLORIDE 50 MG/ML
25-50 INJECTION INTRAMUSCULAR; INTRAVENOUS
Status: DISCONTINUED | OUTPATIENT
Start: 2019-01-23 | End: 2019-01-23 | Stop reason: HOSPADM

## 2019-01-23 RX ORDER — LIDOCAINE 40 MG/G
CREAM TOPICAL
Status: DISCONTINUED | OUTPATIENT
Start: 2019-01-23 | End: 2019-01-23 | Stop reason: HOSPADM

## 2019-01-23 RX ORDER — EPINEPHRINE 1 MG/ML
0.3 INJECTION, SOLUTION, CONCENTRATE INTRAVENOUS
Status: DISCONTINUED | OUTPATIENT
Start: 2019-01-23 | End: 2019-01-23 | Stop reason: HOSPADM

## 2019-01-23 RX ORDER — HYDROCODONE BITARTRATE AND ACETAMINOPHEN 5; 325 MG/1; MG/1
1-2 TABLET ORAL EVERY 4 HOURS PRN
Status: DISCONTINUED | OUTPATIENT
Start: 2019-01-23 | End: 2019-01-23 | Stop reason: HOSPADM

## 2019-01-23 RX ORDER — HYDRALAZINE HYDROCHLORIDE 20 MG/ML
10-20 INJECTION INTRAMUSCULAR; INTRAVENOUS
Status: DISCONTINUED | OUTPATIENT
Start: 2019-01-23 | End: 2019-01-23 | Stop reason: HOSPADM

## 2019-01-23 RX ORDER — ASPIRIN 81 MG/1
81-324 TABLET, CHEWABLE ORAL
Status: DISCONTINUED | OUTPATIENT
Start: 2019-01-23 | End: 2019-01-23 | Stop reason: HOSPADM

## 2019-01-23 RX ORDER — DEXTROSE MONOHYDRATE 25 G/50ML
12.5-5 INJECTION, SOLUTION INTRAVENOUS EVERY 30 MIN PRN
Status: DISCONTINUED | OUTPATIENT
Start: 2019-01-23 | End: 2019-01-23 | Stop reason: HOSPADM

## 2019-01-23 RX ORDER — PHENYLEPHRINE HCL IN 0.9% NACL 1 MG/10 ML
20-100 SYRINGE (ML) INTRAVENOUS
Status: DISCONTINUED | OUTPATIENT
Start: 2019-01-23 | End: 2019-01-23 | Stop reason: HOSPADM

## 2019-01-23 RX ORDER — ENALAPRILAT 1.25 MG/ML
1.25-2.5 INJECTION INTRAVENOUS
Status: DISCONTINUED | OUTPATIENT
Start: 2019-01-23 | End: 2019-01-23 | Stop reason: HOSPADM

## 2019-01-23 RX ORDER — ATROPINE SULFATE 0.1 MG/ML
0.5 INJECTION INTRAVENOUS EVERY 5 MIN PRN
Status: DISCONTINUED | OUTPATIENT
Start: 2019-01-23 | End: 2019-01-23 | Stop reason: HOSPADM

## 2019-01-23 RX ORDER — NALOXONE HYDROCHLORIDE 0.4 MG/ML
.1-.4 INJECTION, SOLUTION INTRAMUSCULAR; INTRAVENOUS; SUBCUTANEOUS
Status: DISCONTINUED | OUTPATIENT
Start: 2019-01-23 | End: 2019-01-23 | Stop reason: HOSPADM

## 2019-01-23 RX ORDER — CLOPIDOGREL BISULFATE 75 MG/1
75 TABLET ORAL
Status: DISCONTINUED | OUTPATIENT
Start: 2019-01-23 | End: 2019-01-23 | Stop reason: HOSPADM

## 2019-01-23 RX ORDER — NITROGLYCERIN 5 MG/ML
100-200 VIAL (ML) INTRAVENOUS
Status: DISCONTINUED | OUTPATIENT
Start: 2019-01-23 | End: 2019-01-23 | Stop reason: HOSPADM

## 2019-01-23 RX ORDER — FENTANYL CITRATE 50 UG/ML
25-50 INJECTION, SOLUTION INTRAMUSCULAR; INTRAVENOUS
Status: DISCONTINUED | OUTPATIENT
Start: 2019-01-23 | End: 2019-01-23 | Stop reason: HOSPADM

## 2019-01-23 RX ORDER — NITROGLYCERIN 5 MG/ML
100-500 VIAL (ML) INTRAVENOUS
Status: DISCONTINUED | OUTPATIENT
Start: 2019-01-23 | End: 2019-01-23 | Stop reason: HOSPADM

## 2019-01-23 RX ORDER — METOPROLOL TARTRATE 1 MG/ML
5 INJECTION, SOLUTION INTRAVENOUS EVERY 5 MIN PRN
Status: DISCONTINUED | OUTPATIENT
Start: 2019-01-23 | End: 2019-01-23 | Stop reason: HOSPADM

## 2019-01-23 RX ORDER — CLOPIDOGREL 300 MG/1
300-600 TABLET, FILM COATED ORAL
Status: DISCONTINUED | OUTPATIENT
Start: 2019-01-23 | End: 2019-01-23 | Stop reason: HOSPADM

## 2019-01-23 RX ORDER — ACETAMINOPHEN 325 MG/1
325-650 TABLET ORAL EVERY 4 HOURS PRN
Status: DISCONTINUED | OUTPATIENT
Start: 2019-01-23 | End: 2019-01-23 | Stop reason: HOSPADM

## 2019-01-23 RX ORDER — VERAPAMIL HYDROCHLORIDE 2.5 MG/ML
1-2.5 INJECTION, SOLUTION INTRAVENOUS
Status: DISCONTINUED | OUTPATIENT
Start: 2019-01-23 | End: 2019-01-23 | Stop reason: HOSPADM

## 2019-01-23 RX ORDER — NALOXONE HYDROCHLORIDE 0.4 MG/ML
.2-.4 INJECTION, SOLUTION INTRAMUSCULAR; INTRAVENOUS; SUBCUTANEOUS
Status: DISCONTINUED | OUTPATIENT
Start: 2019-01-23 | End: 2019-01-23 | Stop reason: HOSPADM

## 2019-01-23 RX ORDER — HEPARIN SODIUM 1000 [USP'U]/ML
1000-10000 INJECTION, SOLUTION INTRAVENOUS; SUBCUTANEOUS EVERY 5 MIN PRN
Status: DISCONTINUED | OUTPATIENT
Start: 2019-01-23 | End: 2019-01-23 | Stop reason: HOSPADM

## 2019-01-23 RX ORDER — POTASSIUM CHLORIDE 7.45 MG/ML
10 INJECTION INTRAVENOUS
Status: DISCONTINUED | OUTPATIENT
Start: 2019-01-23 | End: 2019-01-23 | Stop reason: HOSPADM

## 2019-01-23 RX ORDER — NITROGLYCERIN 0.4 MG/1
0.4 TABLET SUBLINGUAL EVERY 5 MIN PRN
Status: DISCONTINUED | OUTPATIENT
Start: 2019-01-23 | End: 2019-01-23 | Stop reason: HOSPADM

## 2019-01-23 RX ORDER — SODIUM CHLORIDE 9 MG/ML
INJECTION, SOLUTION INTRAVENOUS CONTINUOUS
Status: DISCONTINUED | OUTPATIENT
Start: 2019-01-23 | End: 2019-01-23 | Stop reason: HOSPADM

## 2019-01-23 RX ORDER — FLUMAZENIL 0.1 MG/ML
0.2 INJECTION, SOLUTION INTRAVENOUS
Status: DISCONTINUED | OUTPATIENT
Start: 2019-01-23 | End: 2019-01-23 | Stop reason: HOSPADM

## 2019-01-23 RX ORDER — LIDOCAINE HYDROCHLORIDE 10 MG/ML
30 INJECTION, SOLUTION EPIDURAL; INFILTRATION; INTRACAUDAL; PERINEURAL
Status: DISCONTINUED | OUTPATIENT
Start: 2019-01-23 | End: 2019-01-23 | Stop reason: HOSPADM

## 2019-01-23 RX ORDER — POTASSIUM CHLORIDE 1500 MG/1
20 TABLET, EXTENDED RELEASE ORAL
Status: COMPLETED | OUTPATIENT
Start: 2019-01-23 | End: 2019-01-23

## 2019-01-23 RX ORDER — POTASSIUM CHLORIDE 29.8 MG/ML
20 INJECTION INTRAVENOUS
Status: DISCONTINUED | OUTPATIENT
Start: 2019-01-23 | End: 2019-01-23 | Stop reason: HOSPADM

## 2019-01-23 RX ORDER — ASPIRIN 81 MG/1
81 TABLET ORAL DAILY
Status: DISCONTINUED | OUTPATIENT
Start: 2019-01-23 | End: 2019-01-23 | Stop reason: HOSPADM

## 2019-01-23 RX ORDER — NITROGLYCERIN 20 MG/100ML
.07-2 INJECTION INTRAVENOUS CONTINUOUS PRN
Status: DISCONTINUED | OUTPATIENT
Start: 2019-01-23 | End: 2019-01-23 | Stop reason: HOSPADM

## 2019-01-23 RX ORDER — ATROPINE SULFATE 0.1 MG/ML
.5-1 INJECTION INTRAVENOUS
Status: DISCONTINUED | OUTPATIENT
Start: 2019-01-23 | End: 2019-01-23 | Stop reason: HOSPADM

## 2019-01-23 RX ORDER — PROPOFOL 10 MG/ML
5-75 INJECTION, EMULSION INTRAVENOUS CONTINUOUS
Status: DISCONTINUED | OUTPATIENT
Start: 2019-01-23 | End: 2019-01-23 | Stop reason: HOSPADM

## 2019-01-23 RX ORDER — SODIUM NITROPRUSSIDE 25 MG/ML
100-200 INJECTION INTRAVENOUS
Status: DISCONTINUED | OUTPATIENT
Start: 2019-01-23 | End: 2019-01-23 | Stop reason: HOSPADM

## 2019-01-23 RX ORDER — DOBUTAMINE HYDROCHLORIDE 200 MG/100ML
2-20 INJECTION INTRAVENOUS CONTINUOUS PRN
Status: DISCONTINUED | OUTPATIENT
Start: 2019-01-23 | End: 2019-01-23 | Stop reason: HOSPADM

## 2019-01-23 RX ORDER — LIDOCAINE HYDROCHLORIDE 10 MG/ML
INJECTION, SOLUTION EPIDURAL; INFILTRATION; INTRACAUDAL; PERINEURAL
Status: DISCONTINUED | OUTPATIENT
Start: 2019-01-23 | End: 2019-01-23 | Stop reason: HOSPADM

## 2019-01-23 RX ORDER — PRASUGREL 10 MG/1
10-60 TABLET, FILM COATED ORAL
Status: DISCONTINUED | OUTPATIENT
Start: 2019-01-23 | End: 2019-01-23 | Stop reason: HOSPADM

## 2019-01-23 RX ORDER — ADENOSINE 3 MG/ML
12-12000 INJECTION, SOLUTION INTRAVENOUS
Status: DISCONTINUED | OUTPATIENT
Start: 2019-01-23 | End: 2019-01-23 | Stop reason: HOSPADM

## 2019-01-23 RX ORDER — FUROSEMIDE 10 MG/ML
20-100 INJECTION INTRAMUSCULAR; INTRAVENOUS
Status: DISCONTINUED | OUTPATIENT
Start: 2019-01-23 | End: 2019-01-23 | Stop reason: HOSPADM

## 2019-01-23 RX ORDER — NICARDIPINE HYDROCHLORIDE 2.5 MG/ML
100 INJECTION INTRAVENOUS
Status: DISCONTINUED | OUTPATIENT
Start: 2019-01-23 | End: 2019-01-23 | Stop reason: HOSPADM

## 2019-01-23 RX ORDER — NALOXONE HYDROCHLORIDE 0.4 MG/ML
0.4 INJECTION, SOLUTION INTRAMUSCULAR; INTRAVENOUS; SUBCUTANEOUS EVERY 5 MIN PRN
Status: DISCONTINUED | OUTPATIENT
Start: 2019-01-23 | End: 2019-01-23 | Stop reason: HOSPADM

## 2019-01-23 RX ORDER — LORAZEPAM 2 MG/ML
.5-2 INJECTION INTRAMUSCULAR EVERY 4 HOURS PRN
Status: DISCONTINUED | OUTPATIENT
Start: 2019-01-23 | End: 2019-01-23 | Stop reason: HOSPADM

## 2019-01-23 RX ORDER — ONDANSETRON 2 MG/ML
4 INJECTION INTRAMUSCULAR; INTRAVENOUS EVERY 4 HOURS PRN
Status: DISCONTINUED | OUTPATIENT
Start: 2019-01-23 | End: 2019-01-23 | Stop reason: HOSPADM

## 2019-01-23 RX ORDER — METHYLPREDNISOLONE SODIUM SUCCINATE 125 MG/2ML
125 INJECTION, POWDER, LYOPHILIZED, FOR SOLUTION INTRAMUSCULAR; INTRAVENOUS
Status: DISCONTINUED | OUTPATIENT
Start: 2019-01-23 | End: 2019-01-23 | Stop reason: HOSPADM

## 2019-01-23 RX ORDER — BUPIVACAINE HYDROCHLORIDE 2.5 MG/ML
1-10 INJECTION, SOLUTION EPIDURAL; INFILTRATION; INTRACAUDAL
Status: DISCONTINUED | OUTPATIENT
Start: 2019-01-23 | End: 2019-01-23 | Stop reason: HOSPADM

## 2019-01-23 RX ORDER — PROTAMINE SULFATE 10 MG/ML
25-100 INJECTION, SOLUTION INTRAVENOUS EVERY 5 MIN PRN
Status: DISCONTINUED | OUTPATIENT
Start: 2019-01-23 | End: 2019-01-23 | Stop reason: HOSPADM

## 2019-01-23 RX ORDER — PROTAMINE SULFATE 10 MG/ML
1-5 INJECTION, SOLUTION INTRAVENOUS
Status: DISCONTINUED | OUTPATIENT
Start: 2019-01-23 | End: 2019-01-23 | Stop reason: HOSPADM

## 2019-01-23 RX ADMIN — MIDAZOLAM 1 MG: 1 INJECTION INTRAMUSCULAR; INTRAVENOUS at 08:46

## 2019-01-23 RX ADMIN — LIDOCAINE HYDROCHLORIDE 3 ML: 10 INJECTION, SOLUTION EPIDURAL; INFILTRATION; INTRACAUDAL; PERINEURAL at 09:03

## 2019-01-23 RX ADMIN — MIDAZOLAM 1 MG: 1 INJECTION INTRAMUSCULAR; INTRAVENOUS at 08:51

## 2019-01-23 RX ADMIN — HEPARIN SODIUM 3000 UNITS: 1000 INJECTION, SOLUTION INTRAVENOUS; SUBCUTANEOUS at 09:04

## 2019-01-23 RX ADMIN — SODIUM CHLORIDE: 9 INJECTION, SOLUTION INTRAVENOUS at 07:54

## 2019-01-23 RX ADMIN — SODIUM CHLORIDE: 9 INJECTION, SOLUTION INTRAVENOUS at 09:33

## 2019-01-23 RX ADMIN — NITROGLYCERIN 150 MCG: 5 INJECTION, SOLUTION INTRAVENOUS at 09:04

## 2019-01-23 RX ADMIN — FENTANYL CITRATE 50 MCG: 50 INJECTION, SOLUTION INTRAMUSCULAR; INTRAVENOUS at 09:02

## 2019-01-23 RX ADMIN — FENTANYL CITRATE 50 MCG: 50 INJECTION, SOLUTION INTRAMUSCULAR; INTRAVENOUS at 08:46

## 2019-01-23 RX ADMIN — LIDOCAINE HYDROCHLORIDE 9 ML: 10 INJECTION, SOLUTION EPIDURAL; INFILTRATION; INTRACAUDAL; PERINEURAL at 08:49

## 2019-01-23 RX ADMIN — MIDAZOLAM 1 MG: 1 INJECTION INTRAMUSCULAR; INTRAVENOUS at 09:03

## 2019-01-23 RX ADMIN — POTASSIUM CHLORIDE 20 MEQ: 1500 TABLET, EXTENDED RELEASE ORAL at 07:55

## 2019-01-23 RX ADMIN — FENTANYL CITRATE 50 MCG: 50 INJECTION, SOLUTION INTRAMUSCULAR; INTRAVENOUS at 08:51

## 2019-01-23 ASSESSMENT — MIFFLIN-ST. JEOR: SCORE: 1757.87

## 2019-01-23 NOTE — DISCHARGE INSTRUCTIONS
Cardiac Angiogram Discharge Instructions - Femoral    After you go home:      Have an adult stay with you until tomorrow.    Drink extra fluids for 2 days.    You may resume your normal diet.    No smoking       For 24 hours - due to the sedation you received:    Relax and take it easy.    Do NOT make any important or legal decisions.    Do NOT drive or operate machines at home or at work.    Do NOT drink alcohol.    Care of Groin Puncture Site:      For the first 24 hrs - check the puncture site every 1-2 hours while awake.    For 2 days, when you cough, sneeze, laugh or move your bowels, hold your hand over the puncture site and press firmly.    Remove the bandaid after 24 hours. If there is minor oozing, apply another bandaid and remove it after 12 hours.    It is normal to have a small bruise or pea size lump at the site.    You may shower tomorrow. Do NOT take a bath, or use a hot tub or pool for at least 3 days. Do NOT scrub the site. Do not use lotion or powder near the puncture site.    Activity:            For 2 days:    No stooping or squatting    Do NOT do any heavy activity such as exercise, lifting, or straining.     No housework, yard work or any activity that make you sweat    Do NOT lift more than 10 pounds    Bleeding:      If you start bleeding from the site in your groin, lie down flat and press firmly on/above the site for 10 minutes.     Once bleeding stops, lay flat for 2 hours.     Call [facility] as soon as you can.       Call 911 right away if you have heavy bleeding or bleeding that does not stop.      Medicines:      If you are taking an antiplatelet medication such as Plavix, Brilinta or Effient, do not stop taking it until you talk to your cardiologist.      If you are on Metformin (Glucophage), do not restart it until you have blood tests (within 2 to 3 days after discharge).  After you have your blood drawn, you may restart the Metformin.     Take your medications, including blood  thinners, unless your provider tells you not to.  If you take Coumadin (Warfarin), have your INR checked by your provider in  3-5 days. Call your clinic to schedule this.    If you have stopped any medicines, check with your provider about when to restart them.    Follow Up Appointments:      Follow up with [facility] Nurse Practitioner at [facility] of patient preference in 7-10 days.    Call the clinic if:      You have increased pain or a large or growing hard lump around the site.    The site is red, swollen, hot or tender.    Blood or fluid is draining from the site.    You have chills or a fever greater than 101 F (38 C).    Your leg feels numb, cool or changes color.    You have hives, a rash or unusual itching.    New pain in the back or belly that you cannot control with Tylenol.    Any questions or concerns.          [facility] [place]:    [phone] [facility] (7 days a week)

## 2019-01-23 NOTE — PROGRESS NOTES
Patient up to ambulate after 2 hour bedrest complete.  Patient states he has some mild discomfort at right groin site.  Site remains covered with tegaderm with pink drainage on bandage-unchanged from earlier.  Able to void, tolerates PO, NSR, VS WNL, ans states has no further questions regarding AVS and instructions.  Discharged home with .

## 2019-01-23 NOTE — IP AVS SNAPSHOT
[place] [address] [address] [place] [address]  Phone:  [phone]                                    After Visit Summary   1/23/2019    Braulio Bunch    MRN: [id number]           After Visit Summary Signature Page    I have received my discharge instructions, and my questions have been answered. I have discussed any challenges I see with this plan with the nurse or doctor.    ..........................................................................................................................................  Patient/Patient Representative Signature      ..........................................................................................................................................  Patient Representative Print Name and Relationship to Patient    ..................................................               ................................................  Date                                   Time    ..........................................................................................................................................  Reviewed by Signature/Title    ...................................................              ..............................................  Date                                               Time          22EPIC Rev 08/18

## 2019-01-23 NOTE — PROGRESS NOTES
Care Suites Arrival    Reason for Visit: Angiogram    A/O. Denies pain. Labs WNL. IV flds infusing. Contrast D/C instructions reviewed with pt with verbal understanding received. Copy given to patient and wife, Cande. Pre-procedure POC discussed.  All questions & concerns addressed. All pulses WNL, no bruit bilat femoral.  Potassium replaced per protocol.  Patient took ASA this morning at home.    Pt resting in bed, denies additional needs at this time, call light within reach. Family at bedside.  Cande will stay with pt overnight.      0805 Dr Carter at bedside to speak with pt & Wife. Consent signed at this time.    0930  Returned post procedure.  No intervention.  Right groin angioseal, C/D/I no hematoma.  Pulses and CMS WNL.  Denies pain. VSS.  Family at bedside.  Discussed activity restrictions and bedrest.  Client verbalizes understanding.    1135  Discharge/AVS instructions including angioseal pamphlet given and reviewed with patient and wife.  All questions answered.  Client verbalizes understanding.  Right groin site oozing ceased.  Tolerating PO.

## 2019-01-23 NOTE — IP AVS SNAPSHOT
MRN:[id number]                      After Visit Summary   1/23/2019    Braulio Bunch    MRN: [id number]           Visit Information        Department      1/23/2019  6:27 AM [facility]          Review of your medicines      CONTINUE these medicines which have NOT CHANGED       Dose / Directions   aspirin 81 MG EC tablet  Commonly known as:  ASA  Used for:  ST elevation myocardial infarction (STEMI) involving other coronary artery of inferior wall (H)      Dose:  81 mg  Take 1 tablet (81 mg) by mouth daily  Quantity:  30 tablet  Refills:  11     metoprolol succinate ER 25 MG 24 hr tablet  Commonly known as:  TOPROL-XL  Used for:  ST elevation myocardial infarction (STEMI) involving other coronary artery of inferior wall (H)      Dose:  25 mg  Take 1 tablet (25 mg) by mouth At Bedtime  Quantity:  90 tablet  Refills:  3     nitroGLYcerin 0.4 MG sublingual tablet  Commonly known as:  NITROSTAT  Used for:  ST elevation myocardial infarction (STEMI) involving other coronary artery of inferior wall (H)      For chest pain place 1 tablet under the tongue every 5 minutes for 3 doses. If symptoms persist 5 minutes after 1st dose call 911.  Quantity:  25 tablet  Refills:  3     rosuvastatin 20 MG tablet  Commonly known as:  CRESTOR  Used for:  ST elevation myocardial infarction (STEMI) involving other coronary artery of inferior wall (H)      Dose:  20 mg  Take 1 tablet (20 mg) by mouth daily  Quantity:  90 tablet  Refills:  3     ticagrelor 90 MG tablet  Commonly known as:  BRILINTA  Used for:  ST elevation myocardial infarction (STEMI) involving other coronary artery of inferior wall (H)      Dose:  90 mg  Take 1 tablet (90 mg) by mouth every 12 hours  Quantity:  180 tablet  Refills:  3     VITAMIN D (CHOLECALCIFEROL) PO      Dose:  1000 Units  Take 1,000 Units by mouth daily  Refills:  0              Protect others around you: Learn how to safely use, store and throw away your  medicines at www.disposemymeds.org.       Follow-ups after your visit       Your next 10 appointments already scheduled    Jan 29, 2019  7:50 AM CST  LAB with [facility]  [facility] [place] ([facility]) [address] [place] [id number]  [phone]   Please do not eat 10-12 hours before your appointment if you are coming in fasting for labs on lipids, cholesterol, or glucose (sugar). This does not apply to pregnant women. Water, hot tea and black coffee (with nothing added) are okay. Do not drink other fluids, diet soda or chew gum.   Jan 29, 2019  8:50 AM CST  Return Visit with ENRIKE Crocker CNP  [facility] ([facility]) [address] [place], [place] [id number]  [facility] [address]  [phone] OPT 2      Care Instructions       After Care Instructions     Discharge Instructions - IF on Metformin (Glucophage or Glucovance) or Metformin containing medications      IF on Metformin (Glucophage or Glucovance) or Metformin containing medications , schedule a Basic Metabolic Panel at [facility] or Primary Clinic in 48 - 72 hours post procedure and PRIOR TO resuming the Metformin or Metformin containing medications.  Hold Metformin (Glucophage or Glucovance) or Metformin containing medications until after the Basic Metabolic Panel on the 2nd or 3rd day following the procedure.  May resume after blood draw is complete.           Further instructions from your care team       Cardiac Angiogram Discharge Instructions - Femoral    After you go home:      Have an adult stay with you until tomorrow.    Drink extra fluids for 2 days.    You may resume your normal diet.    No smoking       For 24 hours - due to the sedation you received:    Relax and take it easy.    Do NOT make any important or legal decisions.    Do NOT drive or operate machines at home or at work.    Do NOT drink alcohol.    Care of Groin Puncture  Site:      For the first 24 hrs - check the puncture site every 1-2 hours while awake.    For 2 days, when you cough, sneeze, laugh or move your bowels, hold your hand over the puncture site and press firmly.    Remove the bandaid after 24 hours. If there is minor oozing, apply another bandaid and remove it after 12 hours.    It is normal to have a small bruise or pea size lump at the site.    You may shower tomorrow. Do NOT take a bath, or use a hot tub or pool for at least 3 days. Do NOT scrub the site. Do not use lotion or powder near the puncture site.    Activity:            For 2 days:    No stooping or squatting    Do NOT do any heavy activity such as exercise, lifting, or straining.     No housework, yard work or any activity that make you sweat    Do NOT lift more than 10 pounds    Bleeding:      If you start bleeding from the site in your groin, lie down flat and press firmly on/above the site for 10 minutes.     Once bleeding stops, lay flat for 2 hours.     Call [facility] as soon as you can.       Call 911 right away if you have heavy bleeding or bleeding that does not stop.      Medicines:      If you are taking an antiplatelet medication such as Plavix, Brilinta or Effient, do not stop taking it until you talk to your cardiologist.      If you are on Metformin (Glucophage), do not restart it until you have blood tests (within 2 to 3 days after discharge).  After you have your blood drawn, you may restart the Metformin.     Take your medications, including blood thinners, unless your provider tells you not to.  If you take Coumadin (Warfarin), have your INR checked by your provider in  3-5 days. Call your clinic to schedule this.    If you have stopped any medicines, check with your provider about when to restart them.    Follow Up Appointments:      Follow up with [facility] Nurse Practitioner at [facility] of patient preference in 7-10 days.    Call the clinic if:      You have increased pain or  "a large or growing hard lump around the site.    The site is red, swollen, hot or tender.    Blood or fluid is draining from the site.    You have chills or a fever greater than 101 F (38 C).    Your leg feels numb, cool or changes color.    You have hives, a rash or unusual itching.    New pain in the back or belly that you cannot control with Tylenol.    Any questions or concerns.          [facility] [place]:    [phone] UMP (7 days a week)          Additional Information About Your Visit       MyChart Information    [facility] gives you secure access to your electronic health record. If you see a primary care provider, you can also send messages to your care team and make appointments. If you have questions, please call your primary care clinic.  If you do not have a primary care provider, please call [phone] and they will assist you.       Care EveryWhere ID    This is your Care EveryWhere ID. This could be used by other organizations to access your [place] medical records  [id number]       Your Vitals Were     Blood Pressure   113/73          Pulse   46            Temperature   97  F (36.1  C) (Oral)          Respirations   16          Height   1.854 m (6' 0.99\")             Weight   85.4 kg (188 lb 4.8 oz)    Pulse Oximetry   96%    BMI (Body Mass Index)   24.85 kg/m           Primary Care Provider Office Phone # Fax #    Josué Vaughn [phone]      Equal Access to Services    JIMENA MEMBRENO : Hadii aad ku hadasho Soomaali, waaxda luqadaha, qaybta kaalmada adeegyada, waxay frank mahoney . So [facility] [phone].    ATENCIÓN: Si habla español, tiene a jean baptiste disposición servicios gratuitos de asistencia lingüística. Llame al [phone].    We comply with applicable federal civil rights laws and [place] laws. We do not discriminate on the basis of race, color, national origin, age, disability, sex, sexual orientation, or gender " identity.           Thank you!    Thank you for choosing [facility] for your care. Our goal is always to provide you with excellent care. Hearing back from our patients is one way we can continue to improve our services. Please take a few minutes to complete the written survey that you may receive in the mail after you visit with us. Thank you!            Medication List      Medications       Morning Afternoon Evening Bedtime As Needed   aspirin 81 MG EC tablet  Also known as:  ASA  INSTRUCTIONS:  Take 1 tablet (81 mg) by mouth daily                    metoprolol succinate ER 25 MG 24 hr tablet  Also known as:  TOPROL-XL  INSTRUCTIONS:  Take 1 tablet (25 mg) by mouth At Bedtime                    nitroGLYcerin 0.4 MG sublingual tablet  Also known as:  NITROSTAT  INSTRUCTIONS:  For chest pain place 1 tablet under the tongue every 5 minutes for 3 doses. If symptoms persist 5 minutes after 1st dose call 911.  Doctor's comments:  Will call when needs this filled  LAST TAKEN:  Ask your nurse or doctor                    rosuvastatin 20 MG tablet  Also known as:  CRESTOR  INSTRUCTIONS:  Take 1 tablet (20 mg) by mouth daily  Doctor's comments:  Will will when needs                    ticagrelor 90 MG tablet  Also known as:  BRILINTA  INSTRUCTIONS:  Take 1 tablet (90 mg) by mouth every 12 hours  Doctor's comments:  Will call when he needs this filled                    VITAMIN D (CHOLECALCIFEROL) PO  INSTRUCTIONS:  Take 1,000 Units by mouth daily

## 2019-01-28 NOTE — PROGRESS NOTES
"History of Present Illness:     Braulio Bunch is a 53 year old male followed here by Dr. Carter who presents for follow up post angiogram.  We met him May 10th, 2018 when he presented with a STEMI.  He has a history of dyslipidemia and had stopped simvastatin for 4 years.  He saw his primary care prior to his MI and went down to his basement and began to do the \"insanity workout\" as he was advised to have lifestyle changes.  He began to have chest pain and was found to have an inferior wall MI while being transported to [facility] by ambulance.  He had a ventricular fibrillation arrest on the way to the Cath Lab.  He was shocked back to sinus rhythm but had torsades directly prior to the angiogram as well. He underwent a 4.0 x 60 mm Synergy drug-eluting stent into his occluded ostial OM 2 branch with jailing of OM-1 with PTCA.  He had a 30% LAD stenosis and the right coronary had mild irregularities He went on to have atypical chest discomfort afterwards which was treated with Ranexa and colchicine.  These have been discontinued.  He had undergone ischemic workups since his stents with no evidence of ischemia by stress echo or nuclear scans. On nuclear scan in July, 2018 his infero, inferolateral MI was apparent with very mild martha-infarct ischemia.     LVEF by LV gram was 45% at the time of his infarct, 50-55% on echocardiogram; Recent echocardiogram reveals EF of 55-60% with mild RV dilatation. Lateral, inferior and anterior RWMA appeared more prominent on this exam. I asked Dr. Carter to review and he felt anterior wall was ok but WMA were more prominent in lateral and inferior walls. This prompted a  CTA and circumflex stents appeared patent. The RCA appeared to have a ulcer crater/vulnerable plaque in the mid RCA with only mild stenosis at that area; trivial plaque was noted in the remainder of the RCA.     This finding was discussed with Dr. Carter who suggested a coronary angiogram which was done " "using OCT and no crater was seen with mild stenosis. LV gram suggested the EF was 35-40% likely all due to previous infarct. This patient has tried 2.5mg of Lisinopril in the past with symptomatic low blood pressures. We talked about adding low dose ace or arb today and he wishes to wait and discuss at visit in May.     He was placed on Crestor; LDL is now 61 HDL 46 triglyceride 67.  His creatinine was slightly elevated after his first angiogram to 1.4 it is back to 1.27. Today creatinine is 1.2 with potassium of 3.4; was 3.5 when he checked in for his angiogram. He has had partial parathyroidectomy in the past.     He had \"brain fog\" on Coreg he is now on low-dose metoprolol.  He does have Ryanaud's phenomenon with mild finger symptoms this winter on Metoprolol. His only complaint is mild fatigue. He wishes to continue Metoprolol for now but if Raynaud's worsens, we can change to Bystolic. He continues to work out twice daily with no symptoms. He notes intermittent mild tingling of RLE but he has had this in the past and has Raynaud's. The RLE has full pulses, is warm and no bruit. Angioseal was used. He state he has had these symptoms in his leg prior to his angiograms and heart disease. Exam shows full pulses, warm RLE, and no bruit     He has a history of PVCs in cardiac rehab.  A Ziopatch monitor was done showing accelerated idioventricular rhythm in the middle of night with some bradycardia during the night.  He denies any significant snoring history as does his wife.Overall these were reviewed by Dr. Carter and given LVEF of 35% or over, we did not pursue an EP consult.     He has recently worn an overnight oximetry to screen for sleep apnea and this was unremarkable.     the remainder of the exam in unremarkable and outlined below.           Impression/Plan:      1.  Coronary artery disease withST segment elevation myocardial infarction in May of 2018 treated with drug-eluting stent to the " circumflex  Recently found to have more prominent WMA in inferolateral walls  cardimyopathy with EF 50% by echo; 35-40% by LV gram  -no crater or ulceration seen in RCA using OCT  -circumflex stents are widely patent  -He had non-flow-limiting disease elsewhere on invasive angiogram  -He had symptomatic low blood pressures therefore is not on lisinopril or arb--can be revisited in May as he wishes to hold off for now  -Continue Brilinta until May 10, 2019 and Dr. Barrow notes on cath report indicate that he may change to Plavix at one year to be continued for an additional year; to be discussed at the upcoming OV  -     2.  Ventricular fibrillation arrest on the way to the Cath Lab with intermittent torsades after he was defibrillated.  -No further arrhythmias  -Ziopatch noted PVC's but EF 40% or higher  -Continue beta-blockers      3.  Dyslipidemia controlled  -continue Crestor 20 mg a day  -recheck labs Spring     4.  Vitamin D deficiency on replacement through primary care     5. Mild kidney injury with chronic kidney disease post angiography.       -Baseline creatinine 1.13 peak 1.4 after invasive angiogram  -1.2 today       6. Raynaud's phenomenon-if symptoms worsen on toprol this winter, consider change to low dose Bystolic as intolerant to coreg as noted above.  -if RLE symptoms worsen, call and I will ultrasound right groin      7. Screening for sleep apnea with overnight oximetry  -results unremarkable--pt notified after the visit      8. Hypokalemia--mild  -he eats a potassium laden diet  -has hx of pararthyroid surgery  -add potassium 10meq daily  -recheck bmp in 3 weeks      It has been a pleasure seeing Braulio Bunch in follow up. I will await bmp in 3 weeks and he will see Dr. Barrow back in May.    Greater than 50% of this 30 minute visit was spent in counseling    Floresita Trejo-Nadira, MSN, APRN-BC, CNP  Cardiology    No orders of the defined types were placed in this encounter.    No orders of  the defined types were placed in this encounter.    There are no discontinued medications.      No diagnosis found.    CURRENT MEDICATIONS:  Current Outpatient Medications   Medication Sig Dispense Refill     aspirin 81 MG EC tablet Take 1 tablet (81 mg) by mouth daily 30 tablet 11     metoprolol succinate (TOPROL-XL) 25 MG 24 hr tablet Take 1 tablet (25 mg) by mouth At Bedtime 90 tablet 3     nitroGLYcerin (NITROSTAT) 0.4 MG sublingual tablet For chest pain place 1 tablet under the tongue every 5 minutes for 3 doses. If symptoms persist 5 minutes after 1st dose call 911. 25 tablet 3     rosuvastatin (CRESTOR) 20 MG tablet Take 1 tablet (20 mg) by mouth daily 90 tablet 3     ticagrelor (BRILINTA) 90 MG tablet Take 1 tablet (90 mg) by mouth every 12 hours 180 tablet 3     VITAMIN D, CHOLECALCIFEROL, PO Take 1,000 Units by mouth daily          ALLERGIES     Allergies   Allergen Reactions     Augmentin      Coreg [Carvedilol]      Brain fog       PAST MEDICAL HISTORY:  Past Medical History:   Diagnosis Date     Acute myocardial infarction (H) 5/13/2018     CAD (coronary artery disease)     Cath 5/10/18- PCI with revascularization to OM- EVELYN placed; jailing of OM-1--dilated lad 30%; mild disease elsewhere     Hyperlipidemia      Kidney calculi      Migraine      Raynaud's disease      STEMI (ST elevation myocardial infarction) (H) 5/10/2018     Ventricular fibrillation (H)     5/10/18 in ER     Vitamin D deficiency        PAST SURGICAL HISTORY:  Past Surgical History:   Procedure Laterality Date     CV HEART CATHETERIZATION WITH POSSIBLE INTERVENTION N/A 1/23/2019    Procedure: Coronary Angiogram with OCT and IVUS to RCA for ulcerated plaque in CTA;  Surgeon: Jeramy Carter MD;  Location: [facility] CARDIAC CATH LAB     PARATHYROIDECTOMY         FAMILY HISTORY:  Family History   Problem Relation Age of Onset     Arrhythmia Mother      Hyperlipidemia Mother      Hypertension Father      Myocardial Infarction Father 81        SOCIAL HISTORY:  Social History     Socioeconomic History     Marital status:      Spouse name: Not on file     Number of children: Not on file     Years of education: Not on file     Highest education level: Not on file   Social Needs     Financial resource strain: Not on file     Food insecurity - worry: Not on file     Food insecurity - inability: Not on file     Transportation needs - medical: Not on file     Transportation needs - non-medical: Not on file   Occupational History     Not on file   Tobacco Use     Smoking status: Never Smoker     Smokeless tobacco: Never Used   Substance and Sexual Activity     Alcohol use: Yes     Comment: rare     Drug use: No     Sexual activity: Not on file   Other Topics Concern     Parent/sibling w/ CABG, MI or angioplasty before 65F 55M? No   Social History Narrative     Not on file       Review of Systems:  Skin:          Eyes:         ENT:         Respiratory:          Cardiovascular:         Gastroenterology:        Genitourinary:         Musculoskeletal:         Neurologic:         Psychiatric:         Heme/Lymph/Imm:         Endocrine:           Physical Exam:  Vitals: There were no vitals taken for this visit.    Constitutional:           Skin:             Head:           Eyes:           Lymph:      ENT:           Neck:           Respiratory:            Cardiac:                                                           GI:           Extremities and Muscular Skeletal:                 Neurological:           Psych:         Recent Lab Results:  LIPID RESULTS:  Lab Results   Component Value Date    CHOL 120 07/30/2018    HDL 46 07/30/2018    LDL 61 07/30/2018    TRIG 67 07/30/2018       LIVER ENZYME RESULTS:  Lab Results   Component Value Date    ALT <5 (L) 07/30/2018       CBC RESULTS:  Lab Results   Component Value Date    WBC 4.8 01/23/2019    RBC 4.95 01/23/2019    HGB 14.9 01/23/2019    HCT 43.6 01/23/2019    MCV 88 01/23/2019    MCH 30.1 01/23/2019     MCHC 34.2 01/23/2019    RDW 12.7 01/23/2019     01/23/2019       BMP RESULTS:  Lab Results   Component Value Date     01/22/2019    POTASSIUM 3.5 01/22/2019    CHLORIDE 105 01/22/2019    CO2 32 (H) 01/22/2019    ANIONGAP 7.5 01/22/2019    GLC 90 01/22/2019    BUN 21 01/22/2019    CR 1.20 01/22/2019    GFRESTIMATED 64 01/22/2019    GFRESTBLACK 77 01/22/2019    KELY 9.8 01/22/2019        A1C RESULTS:  No results found for: A1C    INR RESULTS:  Lab Results   Component Value Date    INR 1.04 01/23/2019           CC  Floresita Tyson, APRN CNP  [id number]  [place] [id number]

## 2019-01-29 ENCOUNTER — OFFICE VISIT (OUTPATIENT)
Dept: CARDIOLOGY | Facility: CLINIC | Age: 54
End: 2019-01-29
Payer: COMMERCIAL

## 2019-01-29 VITALS
WEIGHT: 189 LBS | BODY MASS INDEX: 25.05 KG/M2 | HEART RATE: 56 BPM | DIASTOLIC BLOOD PRESSURE: 70 MMHG | SYSTOLIC BLOOD PRESSURE: 112 MMHG | HEIGHT: 73 IN

## 2019-01-29 DIAGNOSIS — I47.20 V-TACH (H): ICD-10-CM

## 2019-01-29 DIAGNOSIS — I25.10 CORONARY ARTERY DISEASE INVOLVING NATIVE CORONARY ARTERY OF NATIVE HEART WITHOUT ANGINA PECTORIS: ICD-10-CM

## 2019-01-29 DIAGNOSIS — I49.01 VENTRICULAR FIBRILLATION (H): ICD-10-CM

## 2019-01-29 DIAGNOSIS — E87.6 HYPOKALEMIA: ICD-10-CM

## 2019-01-29 DIAGNOSIS — I25.10 CORONARY ARTERY DISEASE INVOLVING NATIVE CORONARY ARTERY OF NATIVE HEART WITHOUT ANGINA PECTORIS: Primary | ICD-10-CM

## 2019-01-29 LAB
ANION GAP SERPL CALCULATED.3IONS-SCNC: 12.4 MMOL/L (ref 6–17)
BUN SERPL-MCNC: 15 MG/DL (ref 7–30)
CALCIUM SERPL-MCNC: 9.9 MG/DL (ref 8.5–10.5)
CHLORIDE SERPL-SCNC: 105 MMOL/L (ref 98–107)
CO2 SERPL-SCNC: 30 MMOL/L (ref 23–29)
CREAT SERPL-MCNC: 1.21 MG/DL (ref 0.7–1.3)
GFR SERPL CREATININE-BSD FRML MDRD: 63 ML/MIN/{1.73_M2}
GLUCOSE SERPL-MCNC: 82 MG/DL (ref 70–105)
POTASSIUM SERPL-SCNC: 3.4 MMOL/L (ref 3.5–5.1)
SODIUM SERPL-SCNC: 144 MMOL/L (ref 136–145)

## 2019-01-29 PROCEDURE — 80048 BASIC METABOLIC PNL TOTAL CA: CPT | Performed by: NURSE PRACTITIONER

## 2019-01-29 PROCEDURE — 36415 COLL VENOUS BLD VENIPUNCTURE: CPT | Performed by: NURSE PRACTITIONER

## 2019-01-29 PROCEDURE — 99214 OFFICE O/P EST MOD 30 MIN: CPT | Performed by: NURSE PRACTITIONER

## 2019-01-29 RX ORDER — POTASSIUM CHLORIDE 750 MG/1
10 TABLET, EXTENDED RELEASE ORAL 2 TIMES DAILY
Qty: 180 TABLET | Refills: 3 | Status: SHIPPED | OUTPATIENT
Start: 2019-01-29 | End: 2019-02-08

## 2019-01-29 ASSESSMENT — MIFFLIN-ST. JEOR: SCORE: 1761.18

## 2019-01-29 NOTE — LETTER
"1/29/2019    Josué Vaughn MD  [facility] [address]  [facility] [address]    RE: Braulio Bunch       Dear Colleague,    I had the pleasure of seeing Braulio Bunch in the [facility].    History of Present Illness:     Braulio Bunch is a 53 year old male followed here by Dr. Carter who presents for follow up post angiogram.  We met him May 10th, 2018 when he presented with a STEMI.  He has a history of dyslipidemia and had stopped simvastatin for 4 years.  He saw his primary care prior to his MI and went down to his basement and began to do the \"insanity workout\" as he was advised to have lifestyle changes.  He began to have chest pain and was found to have an inferior wall MI while being transported to [facility] by ambulance.  He had a ventricular fibrillation arrest on the way to the Cath Lab.  He was shocked back to sinus rhythm but had torsades directly prior to the angiogram as well. He underwent a 4.0 x 60 mm Synergy drug-eluting stent into his occluded ostial OM 2 branch with jailing of OM-1 with PTCA.  He had a 30% LAD stenosis and the right coronary had mild irregularities He went on to have atypical chest discomfort afterwards which was treated with Ranexa and colchicine.  These have been discontinued.  He had undergone ischemic workups since his stents with no evidence of ischemia by stress echo or nuclear scans. On nuclear scan in July, 2018 his infero, inferolateral MI was apparent with very mild martha-infarct ischemia.     LVEF by LV gram was 45% at the time of his infarct, 50-55% on echocardiogram; Recent echocardiogram reveals EF of 55-60% with mild RV dilatation. Lateral, inferior and anterior RWMA appeared more prominent on this exam. I asked Dr. Carter to review and he felt anterior wall was ok but WMA were more prominent in lateral and inferior walls. This prompted a  CTA and circumflex stents appeared patent. The RCA appeared to have " "a ulcer crater/vulnerable plaque in the mid RCA with only mild stenosis at that area; trivial plaque was noted in the remainder of the RCA.     This finding was discussed with Dr. Carter who suggested a coronary angiogram which was done using OCT and no crater was seen with mild stenosis. LV gram suggested the EF was 35-40% likely all due to previous infarct. This patient has tried 2.5mg of Lisinopril in the past with symptomatic low blood pressures. We talked about adding low dose ace or arb today and he wishes to wait and discuss at visit in May.     He was placed on Crestor; LDL is now 61 HDL 46 triglyceride 67.  His creatinine was slightly elevated after his first angiogram to 1.4 it is back to 1.27. Today creatinine is 1.2 with potassium of 3.4; was 3.5 when he checked in for his angiogram. He has had partial parathyroidectomy in the past.     He had \"brain fog\" on Coreg he is now on low-dose metoprolol.  He does have Ryanaud's phenomenon with mild finger symptoms this winter on Metoprolol. His only complaint is mild fatigue. He wishes to continue Metoprolol for now but if Raynaud's worsens, we can change to Bystolic. He continues to work out twice daily with no symptoms. He notes intermittent mild tingling of RLE but he has had this in the past and has Raynaud's. The RLE has full pulses, is warm and no bruit. Angioseal was used. He state he has had these symptoms in his leg prior to his angiograms and heart disease. Exam shows full pulses, warm RLE, and no bruit     He has a history of PVCs in cardiac rehab.  A Ziopatch monitor was done showing accelerated idioventricular rhythm in the middle of night with some bradycardia during the night.  He denies any significant snoring history as does his wife.Overall these were reviewed by Dr. Carter and given LVEF of 35% or over, we did not pursue an EP consult.     He has recently worn an overnight oximetry to screen for sleep apnea and this was " unremarkable.     the remainder of the exam in unremarkable and outlined below.           Impression/Plan:      1.  Coronary artery disease withST segment elevation myocardial infarction in May of 2018 treated with drug-eluting stent to the circumflex  Recently found to have more prominent WMA in inferolateral walls  cardimyopathy with EF 50% by echo; 35-40% by LV gram  -no crater or ulceration seen in RCA using OCT  -circumflex stents are widely patent  -He had non-flow-limiting disease elsewhere on invasive angiogram  -He had symptomatic low blood pressures therefore is not on lisinopril or arb--can be revisited in May as he wishes to hold off for now  -Continue Brilinta until May 10, 2019  and Dr. Barrow notes on cath report indicate that he may change to Plavix at one year to be continued for an additional year; to be discussed at the upcoming OV  -     2.  Ventricular fibrillation arrest on the way to the Cath Lab with intermittent torsades after he was defibrillated.  -No further arrhythmias  -Ziopatch noted PVC's but EF 40% or higher  -Continue beta-blockers      3.  Dyslipidemia controlled  -continue Crestor 20 mg a day  -recheck labs Spring     4.  Vitamin D deficiency on replacement through primary care     5. Mild kidney injury with chronic kidney disease post angiography.       -Baseline creatinine 1.13 peak 1.4 after invasive angiogram  -1.2 today       6. Raynaud's phenomenon-if symptoms worsen on toprol this winter, consider change to low dose Bystolic as intolerant to coreg as noted above.  -if RLE symptoms worsen, call and I will ultrasound right groin      7. Screening for sleep apnea with overnight oximetry  -results unremarkable--pt notified after the visit      8. Hypokalemia--mild  -he eats a potassium laden diet  -has hx of pararthyroid surgery  -add potassium 10meq daily  -recheck bmp in 3 weeks      It has been a pleasure seeing Braulio Bunch in follow up. I will await bmp in 3 weeks and  he will see Dr. Barrow back in May.    Greater than 50% of this 30 minute visit was spent in counseling    Floresita Tyson, MSN, APRN-BC, CNP  Cardiology    No orders of the defined types were placed in this encounter.    No orders of the defined types were placed in this encounter.    There are no discontinued medications.      No diagnosis found.    CURRENT MEDICATIONS:  Current Outpatient Medications   Medication Sig Dispense Refill     aspirin 81 MG EC tablet Take 1 tablet (81 mg) by mouth daily 30 tablet 11     metoprolol succinate (TOPROL-XL) 25 MG 24 hr tablet Take 1 tablet (25 mg) by mouth At Bedtime 90 tablet 3     nitroGLYcerin (NITROSTAT) 0.4 MG sublingual tablet For chest pain place 1 tablet under the tongue every 5 minutes for 3 doses. If symptoms persist 5 minutes after 1st dose call 911. 25 tablet 3     rosuvastatin (CRESTOR) 20 MG tablet Take 1 tablet (20 mg) by mouth daily 90 tablet 3     ticagrelor (BRILINTA) 90 MG tablet Take 1 tablet (90 mg) by mouth every 12 hours 180 tablet 3     VITAMIN D, CHOLECALCIFEROL, PO Take 1,000 Units by mouth daily          ALLERGIES     Allergies   Allergen Reactions     Augmentin      Coreg [Carvedilol]      Brain fog       PAST MEDICAL HISTORY:  Past Medical History:   Diagnosis Date     Acute myocardial infarction (H) 5/13/2018     CAD (coronary artery disease)     Cath 5/10/18- PCI with revascularization to OM- EVELYN placed; jailing of OM-1--dilated lad 30%; mild disease elsewhere     Hyperlipidemia      Kidney calculi      Migraine      Raynaud's disease      STEMI (ST elevation myocardial infarction) (H) 5/10/2018     Ventricular fibrillation (H)     5/10/18 in ER     Vitamin D deficiency        PAST SURGICAL HISTORY:  Past Surgical History:   Procedure Laterality Date     CV HEART CATHETERIZATION WITH POSSIBLE INTERVENTION N/A 1/23/2019    Procedure: Coronary Angiogram with OCT and IVUS to RCA for ulcerated plaque in CTA;  Surgeon: Jeramy Carter MD;   Location:  HEART CARDIAC CATH LAB     PARATHYROIDECTOMY         FAMILY HISTORY:  Family History   Problem Relation Age of Onset     Arrhythmia Mother      Hyperlipidemia Mother      Hypertension Father      Myocardial Infarction Father 81       SOCIAL HISTORY:  Social History     Socioeconomic History     Marital status:      Spouse name: Not on file     Number of children: Not on file     Years of education: Not on file     Highest education level: Not on file   Social Needs     Financial resource strain: Not on file     Food insecurity - worry: Not on file     Food insecurity - inability: Not on file     Transportation needs - medical: Not on file     Transportation needs - non-medical: Not on file   Occupational History     Not on file   Tobacco Use     Smoking status: Never Smoker     Smokeless tobacco: Never Used   Substance and Sexual Activity     Alcohol use: Yes     Comment: rare     Drug use: No     Sexual activity: Not on file   Other Topics Concern     Parent/sibling w/ CABG, MI or angioplasty before 65F 55M? No   Social History Narrative     Not on file       Review of Systems:  Skin:          Eyes:         ENT:         Respiratory:          Cardiovascular:         Gastroenterology:        Genitourinary:         Musculoskeletal:         Neurologic:         Psychiatric:         Heme/Lymph/Imm:         Endocrine:           Physical Exam:  Vitals: There were no vitals taken for this visit.    Constitutional:           Skin:             Head:           Eyes:           Lymph:      ENT:           Neck:           Respiratory:            Cardiac:                                                           GI:           Extremities and Muscular Skeletal:                 Neurological:           Psych:         Recent Lab Results:  LIPID RESULTS:  Lab Results   Component Value Date    CHOL 120 07/30/2018    HDL 46 07/30/2018    LDL 61 07/30/2018    TRIG 67 07/30/2018       LIVER ENZYME RESULTS:  Lab Results    Component Value Date    ALT <5 (L) 07/30/2018       CBC RESULTS:  Lab Results   Component Value Date    WBC 4.8 01/23/2019    RBC 4.95 01/23/2019    HGB 14.9 01/23/2019    HCT 43.6 01/23/2019    MCV 88 01/23/2019    MCH 30.1 01/23/2019    MCHC 34.2 01/23/2019    RDW 12.7 01/23/2019     01/23/2019       BMP RESULTS:  Lab Results   Component Value Date     01/22/2019    POTASSIUM 3.5 01/22/2019    CHLORIDE 105 01/22/2019    CO2 32 (H) 01/22/2019    ANIONGAP 7.5 01/22/2019    GLC 90 01/22/2019    BUN 21 01/22/2019    CR 1.20 01/22/2019    GFRESTIMATED 64 01/22/2019    GFRESTBLACK 77 01/22/2019    KELY 9.8 01/22/2019        A1C RESULTS:  No results found for: A1C    INR RESULTS:  Lab Results   Component Value Date    INR 1.04 01/23/2019           CC  ENRIKE Crocker CNP  [address]  [place] [address]                  Thank you for allowing me to participate in the care of your patient.      Sincerely,     ENRIKE Crocker CNP     [facility]

## 2019-01-29 NOTE — PATIENT INSTRUCTIONS
Start potassium tablet one per day    Check potassium in 3 weeks    See Dr. Carter back in May with fasting labs

## 2019-01-31 LAB — INTERPRETATION ECG - MUSE: NORMAL

## 2019-02-08 ENCOUNTER — MYC MEDICAL ADVICE (OUTPATIENT)
Dept: CARDIOLOGY | Facility: CLINIC | Age: 54
End: 2019-02-08

## 2019-02-08 DIAGNOSIS — E87.6 HYPOKALEMIA: ICD-10-CM

## 2019-02-08 RX ORDER — POTASSIUM CHLORIDE 750 MG/1
10 TABLET, EXTENDED RELEASE ORAL DAILY
Qty: 90 TABLET | Refills: 3 | Status: SHIPPED | OUTPATIENT
Start: 2019-02-08 | End: 2020-05-21

## 2019-02-08 NOTE — TELEPHONE ENCOUNTER
Pt called today informing nurse of symptoms, and or side effects he is having from potassium.  Pt is eating and drinking fluid with pills. Offered Pt earlier check of potassium level and did put lab appointment on 2/12/19.  Pt also sent message to NP, and she may have other recommendations. Did discuss powder K+ but usually more expensive, and may still cause distress.  RICHIE Mclaughlin RN

## 2019-02-12 ENCOUNTER — CARE COORDINATION (OUTPATIENT)
Dept: CARDIOLOGY | Facility: CLINIC | Age: 54
End: 2019-02-12

## 2019-02-12 DIAGNOSIS — E87.6 HYPOKALEMIA: ICD-10-CM

## 2019-02-12 LAB
ANION GAP SERPL CALCULATED.3IONS-SCNC: 12.5 MMOL/L (ref 6–17)
BUN SERPL-MCNC: 18 MG/DL (ref 7–30)
CALCIUM SERPL-MCNC: 9.7 MG/DL (ref 8.5–10.5)
CHLORIDE SERPL-SCNC: 104 MMOL/L (ref 98–107)
CO2 SERPL-SCNC: 31 MMOL/L (ref 23–29)
CREAT SERPL-MCNC: 1.07 MG/DL (ref 0.7–1.3)
GFR SERPL CREATININE-BSD FRML MDRD: 73 ML/MIN/{1.73_M2}
GLUCOSE SERPL-MCNC: 83 MG/DL (ref 70–105)
POTASSIUM SERPL-SCNC: 3.5 MMOL/L (ref 3.5–5.1)
SODIUM SERPL-SCNC: 144 MMOL/L (ref 136–145)

## 2019-02-12 PROCEDURE — 80048 BASIC METABOLIC PNL TOTAL CA: CPT | Performed by: NURSE PRACTITIONER

## 2019-02-12 PROCEDURE — 36415 COLL VENOUS BLD VENIPUNCTURE: CPT | Performed by: NURSE PRACTITIONER

## 2019-02-12 NOTE — PROGRESS NOTES
After Visit Summary   12/18/2017    Yarelis Penny    MRN: [id number]           Patient Information     Date Of Birth          1984        Visit Information        Provider Department      12/18/2017 7:30 AM Cresencio Sierra PA-C [facility]        Today's Diagnoses     Inflammatory bowel disease    -  1      Care Instructions    It was a pleasure taking care of you today.  I've included a brief summary of our discussion and care plan from today's visit below.  Please review this information with your primary care provider.  ______________________________________________________________________    My recommendations are summarized as follows:    -- Continue Entyvio every 4 weeks  -- Labs with every infusion   -- Next endoscopic assessment: TBD  -- Patient with IBD we recommend supplementation vitamin D 1000 units daily and calcium 500 mg twice daily.  -- Vaccines/immunizations to be updated: up to date  -- Yearly Dermatology visit for skin check while on immunosuppressive therapy  -- Yearly pap smear while on immunosuppressive therapy    Return to GI Clinic in 3 months to review your progress.    ______________________________________________________________________    Who do I call with any questions after my visit?  Please be in touch if there are any further questions that arise following today's visit.  There are multiple ways to contact your gastroenterology care team.        During business hours, you may reach a Gastroenterology nurse at [phone], option 3.       To schedule or reschedule an appointment, please call [phone].       You can always send a secure message through [facility] [employer].  [facility] [employer] messages are answered by your nurse or doctor typically within 24 hours.  Please allow extra time on weekends and holidays.        For urgent/emergent questions after business hours, you may reach the on-call GI Fellow by contacting the [facility]      Called and spoke with pt.  Discussed that SAYRA Truong reviewed BMP results from today and potassium is low normal at 3.5 and would recommend he continue taking KCL 10mEq once per day if he is tolerating.  Pt reports he started KCL 10mEq daily on 2/9/19 and already feels about 80% better in regards to his mental fogginess and fatigue.  He still has some abdominal distention and flatulence, but that seems to be improving as well.  Pt states he will plan to continue taking KCL 10mEq daily and will send update if above noted symptoms do not continue to improve.  Pt inquired if he should keep lab appt on 2/19/19 or push out further.  Discussed with pt that will review when lab should be rechecked with SAYRA Truong and send MyChart with reponse. Pt agrees to have lab rescheduled to any day before 8:00am if recommended.     Routed to SAYRA Truong for review.    GREGORIO Childs 10:02 AM 2/12/2019     at [phone].     How will I get the results of any tests ordered?    You will receive all of your results.  If you have signed up for [employer], any tests ordered at your visit will be available to you after your physician reviews them.  Typically this takes 1-2 weeks.  If there are urgent results that require a change in your care plan, your physician or nurse will call you to discuss the next steps.      What is [employer]?  [employer] is a secure way for you to access all of your healthcare records from the [facility].  It is a web based computer program, so you can sign on to it from any location.  It also allows you to send secure messages to your care team.  I recommend signing up for RealDe[employer] access if you have not already done so and are comfortable with using a computer.      How to I schedule a follow-up visit?  If you did not schedule a follow-up visit today, please call [phone] to schedule a follow-up office visit.        Sincerely,    Cresencio Sierra PA-C  [facility]  Division of Gastroenterology                Follow-ups after your visit        Additional Services     DERMATOLOGY REFERRAL       Your provider has referred you to: [facility]: Dermatology Clinic [facility] [phone]   [email or web address]    Please be aware that coverage of these services is subject to the terms and limitations of your health insurance plan.  Call member services at your health plan with any benefit or coverage questions.      Please bring the following with you to your appointment:    (1) Any X-Rays, CTs or MRIs which have been performed.  Contact the facility where they were done to arrange for  prior to your scheduled appointment.    (2) List of current medications  (3) This referral request   (4) Any documents/labs given to you for this referral                  Follow-up notes from your care team     Return in about 3 months (around 3/18/2018).       Your next 10 appointments already scheduled     Dec 20, 2017  1:00 PM CST   Infusion 180 with UC SPEC INFUSION, UC 41 ATC   [facility] ([facility])    [address] [place] [address] [id number] [phone]            Dec 22, 2017 10:20 AM CST   (Arrive by 10:05 AM)   CT CHEST W/O CONTRAST with UCCT2   [facility] CT ([facility])    [address] [place] [address] [id number] [phone]           Please bring any scans or X-rays taken at other hospitals, if similar tests were done. Also bring a list of your medicines, including vitamins, minerals and over-the-counter drugs. It is safest to leave personal items at home.  Be sure to tell your doctor:   If you have any allergies.   If there s any chance you are pregnant.   If you are breastfeeding.   If you have any special needs.  You do not need to do anything special to prepare.  Please wear loose clothing, such as a sweat suit or jogging clothes. Avoid snaps, zippers and other metal. We may ask you to undress and put on a hospital gown.            Dec 22, 2017 10:40 AM CST   (Arrive by 10:25 AM)   CT ABDOMEN PELVIS W/O & W CONTRAST with UCCT2   [facility] CT ([facility])    [address] [place] [address] [id number] [phone]           Please bring any scans or X-rays taken at other hospitals, if similar tests were done. Also bring a list of your medicines, including vitamins, minerals and over-the-counter drugs. It is safest to leave personal items at home.  Be sure to tell your doctor:   If you have any allergies.   If there s any chance you are pregnant.   If you are breastfeeding.   If you have any special needs.  You may have contrast for this exam. To prepare:   Do not eat or drink for 2 hours before your exam. If you need to take medicine, you may take  it with small sips of water. (We may ask you to take liquid medicine as well.)   The day before your exam, drink extra fluids at least six 8-ounce glasses (unless your doctor tells you to restrict your fluids).  Patients over 70 or patients with diabetes or kidney problems:   If you haven t had a blood test (creatinine test) within the last 30 days, go to your clinic or Diagnostic Imaging Department for this test.  If you have diabetes:   If your kidney function is normal, continue taking your metformin (Avandamet, Glucophage, Glucovance, Metaglip) on the day of your exam.   If your kidney function is abnormal, wait 48 hours before restarting this medicine.  You will have oral contrast for this exam:   You will drink the contrast at home. Get this from your clinic or Diagnostic Imaging Department. Please follow the directions given.  Please wear loose clothing, such as a sweat suit or jogging clothes. Avoid snaps, zippers and other metal. We may ask you to undress and put on a hospital gown.  If you have any questions, please call the Imaging Department where you will have your exam.            Dec 05, 2018  8:00 AM CST   (Arrive by 7:45 AM)   Return Visit with Walker Nails MD   [facility] ([facility])    [address] [place] [id number] [phone]              Future tests that were ordered for you today     Open Future Orders        Priority Expected Expires Ordered    Calprotectin Feces Routine  12/18/2018 12/18/2017            Who to contact     Please call your clinic at [phone] to:    Ask questions about your health    Make or cancel appointments    Discuss your medicines    Learn about your test results    Speak to your doctor   If you have compliments or concerns about an experience at your clinic, or if you wish to file a complaint, please contact [facility] Patient Relations at  "[phone] or email us at [email or web address]         Additional Information About Your Visit        [employer] Information     Speek gives you secure access to your electronic health record. If you see a primary care provider, you can also send messages to your care team and make appointments. If you have questions, please call your primary care clinic.  If you do not have a primary care provider, please call [phone] and they will assist you.      Speek is an electronic gateway that provides easy, online access to your medical records. With Speek, you can request a clinic appointment, read your test results, renew a prescription or communicate with your care team.     To access your existing account, please contact your [facility] or call [phone] for assistance.        Care EveryWhere ID     This is your Care EveryWhere ID. This could be used by other organizations to access your [facility] medical records  [id number]        Your Vitals Were     Pulse Temperature Height Pulse Oximetry BMI (Body Mass Index)       72 98.6  F (37  C) 1.575 m (5' 2.01\") 100% 20.17 kg/m2        Blood Pressure from Last 3 Encounters:   12/18/17 116/83   12/06/17 111/73   12/05/17 121/83    Weight from Last 3 Encounters:   12/18/17 50 kg (110 lb 4.8 oz)   12/06/17 48.7 kg (107 lb 4.8 oz)   12/05/17 49.3 kg (108 lb 11.2 oz)              We Performed the Following     DERMATOLOGY REFERRAL        Primary Care Provider Office Phone # Fax #    Jackson S Esteban [phone]       [facility] [address]  [facility] [address]        Equal Access to Services     OSITO CHAKRABORTY AH: Hadii venancio hart Soyari, waaxda luqadaha, qaybta kaalmada adeegyada, mani lozano. So [facility] [phone].    ATENCIÓN: Si habla español, tiene a borja disposición servicios gratuitos de asistencia lingüística. Llame al [phone].    We comply with " applicable federal civil rights laws and [place] laws. We do not discriminate on the basis of race, color, national origin, age, disability, sex, sexual orientation, or gender identity.            Thank you!     Thank you for choosing [facility]  for your care. Our goal is always to provide you with excellent care. Hearing back from our patients is one way we can continue to improve our services. Please take a few minutes to complete the written survey that you may receive in the mail after your visit with us. Thank you!             Your Updated Medication List - Protect others around you: Learn how to safely use, store and throw away your medicines at www.disposemymeds.org.          This list is accurate as of: 12/18/17  8:03 AM.  Always use your most recent med list.                   Brand Name Dispense Instructions for use Diagnosis    budesonide 9 MG 24 hr tablet    UCERIS    30 tablet    Take 1 tablet (9 mg) by mouth every morning    UC (ulcerative colitis) (H)       ENTYVIO IV      Inject 300 mg into the vein every 28 days        GNP ASPIRIN LOW DOSE 81 MG EC tablet   Generic drug:  aspirin     30 tablet    Take 1 tablet (81 mg) by mouth daily    Ulcerative colitis with other complication, unspecified location (H), Liver replaced by transplant (H)       predniSONE 5 MG tablet    DELTASONE    225 tablet    Take 1 tablet (5 mg) by mouth daily    UC (ulcerative colitis) (H), Liver replaced by transplant (H)       sulfamethoxazole-trimethoprim 800-160 MG per tablet    BACTRIM DS/SEPTRA DS    90 tablet    Take 1 tablet by mouth three times a week Monday, Wednesday, Friday    Immunosuppression (H), Inflammatory bowel disease, PSC (primary sclerosing cholangitis)       * tacrolimus 0.5 MG capsule    GENERIC EQUIVALENT    180 capsule    Take 1 capsule (0.5 mg) by mouth 2 times daily Take with 1 mg capsule. Total dose 2.5 mg every 12 hours    S/P liver transplant (H)       * tacrolimus  1 MG capsule    GENERIC EQUIVALENT    360 capsule    Take 2 capsules (2 mg) by mouth 2 times daily *total dose 2.5 mg BID. Take with 0.5 mg capsule    S/P liver transplant (H)       ursodiol 250 MG tablet    ACTIGALL    270 tablet    Take 2 tabs (500 mg) in AM, and 1 tab at night (250 mg)    Liver replaced by transplant (H)       zolpidem 5 MG tablet    AMBIEN    60 tablet    Take 1 tablet (5 mg) by mouth nightly as needed for sleep        * Notice:  This list has 2 medication(s) that are the same as other medications prescribed for you. Read the directions carefully, and ask your doctor or other care provider to review them with you.

## 2019-02-12 NOTE — PROGRESS NOTES
See [employer] message sent to pt with SAYRA Truong's recommendation.     GREGORIO Childs 10:29 AM 2/12/2019

## 2019-04-08 DIAGNOSIS — I21.19 ST ELEVATION MYOCARDIAL INFARCTION (STEMI) INVOLVING OTHER CORONARY ARTERY OF INFERIOR WALL (H): ICD-10-CM

## 2019-04-08 RX ORDER — METOPROLOL SUCCINATE 25 MG/1
25 TABLET, EXTENDED RELEASE ORAL AT BEDTIME
Qty: 90 TABLET | Refills: 0 | Status: SHIPPED | OUTPATIENT
Start: 2019-04-08 | End: 2019-07-31

## 2019-04-26 ENCOUNTER — MYC MEDICAL ADVICE (OUTPATIENT)
Dept: CARDIOLOGY | Facility: CLINIC | Age: 54
End: 2019-04-26

## 2019-04-26 NOTE — TELEPHONE ENCOUNTER
Update**  You are now scheduled for June 18th 0730 am lab work - please fast for 8hours  Then follow up office visit with Dr. Carter in the [facility] - [address], [address] in [facility]    Call with questions please.  Thanks  Lori

## 2019-05-21 DIAGNOSIS — I25.10 CORONARY ARTERY DISEASE INVOLVING NATIVE CORONARY ARTERY OF NATIVE HEART WITHOUT ANGINA PECTORIS: ICD-10-CM

## 2019-05-21 LAB
ALT SERPL W P-5'-P-CCNC: 12 U/L (ref 5–30)
ANION GAP SERPL CALCULATED.3IONS-SCNC: 13.7 MMOL/L (ref 6–17)
BUN SERPL-MCNC: 18 MG/DL (ref 7–30)
CALCIUM SERPL-MCNC: 10 MG/DL (ref 8.5–10.5)
CHLORIDE SERPL-SCNC: 105 MMOL/L (ref 98–107)
CHOLEST SERPL-MCNC: 138 MG/DL
CO2 SERPL-SCNC: 27 MMOL/L (ref 23–29)
CREAT SERPL-MCNC: 1.23 MG/DL (ref 0.7–1.3)
GFR SERPL CREATININE-BSD FRML MDRD: 62 ML/MIN/{1.73_M2}
GLUCOSE SERPL-MCNC: 98 MG/DL (ref 70–105)
HDLC SERPL-MCNC: 52 MG/DL
LDLC SERPL CALC-MCNC: 72 MG/DL
NONHDLC SERPL-MCNC: 86 MG/DL
POTASSIUM SERPL-SCNC: 3.7 MMOL/L (ref 3.5–5.1)
SODIUM SERPL-SCNC: 142 MMOL/L (ref 136–145)
TRIGL SERPL-MCNC: 69 MG/DL

## 2019-05-21 PROCEDURE — 80061 LIPID PANEL: CPT | Performed by: INTERNAL MEDICINE

## 2019-05-21 PROCEDURE — 80048 BASIC METABOLIC PNL TOTAL CA: CPT | Performed by: INTERNAL MEDICINE

## 2019-05-21 PROCEDURE — 84460 ALANINE AMINO (ALT) (SGPT): CPT | Performed by: INTERNAL MEDICINE

## 2019-05-21 PROCEDURE — 36415 COLL VENOUS BLD VENIPUNCTURE: CPT | Performed by: INTERNAL MEDICINE

## 2019-05-22 ENCOUNTER — OFFICE VISIT (OUTPATIENT)
Dept: CARDIOLOGY | Facility: CLINIC | Age: 54
End: 2019-05-22
Payer: COMMERCIAL

## 2019-05-22 VITALS — HEIGHT: 73 IN | BODY MASS INDEX: 25.71 KG/M2 | WEIGHT: 194 LBS

## 2019-05-22 DIAGNOSIS — I25.10 CORONARY ARTERY DISEASE INVOLVING NATIVE CORONARY ARTERY OF NATIVE HEART WITHOUT ANGINA PECTORIS: ICD-10-CM

## 2019-05-22 DIAGNOSIS — R00.2 PALPITATIONS: Primary | ICD-10-CM

## 2019-05-22 PROCEDURE — 93000 ELECTROCARDIOGRAM COMPLETE: CPT | Performed by: INTERNAL MEDICINE

## 2019-05-22 PROCEDURE — 99214 OFFICE O/P EST MOD 30 MIN: CPT | Performed by: INTERNAL MEDICINE

## 2019-05-22 ASSESSMENT — MIFFLIN-ST. JEOR: SCORE: 1778.73

## 2019-05-22 NOTE — LETTER
5/22/2019    Josué Vaughn MD  [facility] [address]  [facility] [address]    RE: Braulio Bunch       Dear Colleague,    I had the pleasure of seeing Braulio Bunch in the [facility].    HPI and Plan:   See dictation    Orders Placed This Encounter   Procedures     Lipid Profile     Follow-Up with Cardiac Advanced Practice Provider     EKG 12-lead complete w/read - Clinics (performed today)     No orders of the defined types were placed in this encounter.    There are no discontinued medications.      Encounter Diagnoses   Name Primary?     Coronary artery disease involving native coronary artery of native heart without angina pectoris      Palpitations Yes       CURRENT MEDICATIONS:  Current Outpatient Medications   Medication Sig Dispense Refill     aspirin 81 MG EC tablet Take 1 tablet (81 mg) by mouth daily 30 tablet 11     metoprolol succinate ER (TOPROL-XL) 25 MG 24 hr tablet Take 1 tablet (25 mg) by mouth At Bedtime 90 tablet 0     potassium chloride ER (K-DUR/KLOR-CON M) 10 MEQ CR tablet Take 1 tablet (10 mEq) by mouth daily 90 tablet 3     rosuvastatin (CRESTOR) 20 MG tablet Take 1 tablet (20 mg) by mouth daily 90 tablet 3     ticagrelor (BRILINTA) 90 MG tablet Take 1 tablet (90 mg) by mouth every 12 hours 180 tablet 3     VITAMIN D, CHOLECALCIFEROL, PO Take 1,000 Units by mouth daily        nitroGLYcerin (NITROSTAT) 0.4 MG sublingual tablet For chest pain place 1 tablet under the tongue every 5 minutes for 3 doses. If symptoms persist 5 minutes after 1st dose call 911. 25 tablet 3       ALLERGIES     Allergies   Allergen Reactions     Augmentin      Coreg [Carvedilol]      Brain fog       PAST MEDICAL HISTORY:  Past Medical History:   Diagnosis Date     Acute myocardial infarction (H) 5/13/2018     CAD (coronary artery disease)     Cath 5/10/18- PCI with revascularization to OM- EVELYN placed; jailing of OM-1--dilated lad 30%; mild disease elsewhere      Hyperlipidemia      Kidney calculi      Migraine      Raynaud's disease      STEMI (ST elevation myocardial infarction) (H) 5/10/2018     Ventricular fibrillation (H)     5/10/18 in ER     Vitamin D deficiency        PAST SURGICAL HISTORY:  Past Surgical History:   Procedure Laterality Date     CV HEART CATHETERIZATION WITH POSSIBLE INTERVENTION N/A 1/23/2019    Procedure: Coronary Angiogram with OCT and IVUS to RCA for ulcerated plaque in CTA;  Surgeon: Jeramy Carter MD;  Location:  HEART CARDIAC CATH LAB     PARATHYROIDECTOMY         FAMILY HISTORY:  Family History   Problem Relation Age of Onset     Arrhythmia Mother      Hyperlipidemia Mother      Hypertension Father      Myocardial Infarction Father 81       SOCIAL HISTORY:  Social History     Socioeconomic History     Marital status:      Spouse name: None     Number of children: None     Years of education: None     Highest education level: None   Occupational History     None   Social Needs     Financial resource strain: None     Food insecurity:     Worry: None     Inability: None     Transportation needs:     Medical: None     Non-medical: None   Tobacco Use     Smoking status: Never Smoker     Smokeless tobacco: Never Used   Substance and Sexual Activity     Alcohol use: Yes     Comment: rare     Drug use: No     Sexual activity: None   Lifestyle     Physical activity:     Days per week: None     Minutes per session: None     Stress: None   Relationships     Social connections:     Talks on phone: None     Gets together: None     Attends Oriental orthodox service: None     Active member of club or organization: None     Attends meetings of clubs or organizations: None     Relationship status: None     Intimate partner violence:     Fear of current or ex partner: None     Emotionally abused: None     Physically abused: None     Forced sexual activity: None   Other Topics Concern     Parent/sibling w/ CABG, MI or angioplasty before 65F 55M? No  "  Social History Narrative     None       Review of Systems:  Skin:  Negative     Eyes:  Positive for glasses  ENT:  Negative    Respiratory:  Negative    Cardiovascular:  Negative palpitations;Positive for  Gastroenterology: Negative    Genitourinary:  not assessed    Musculoskeletal:  Negative    Neurologic:  Positive for (numbness in left leg into foot)  Psychiatric:  Negative    Heme/Lymph/Imm:  Positive for allergies  Endocrine:  Positive for      Physical Exam:  Vitals: Ht 1.854 m (6' 0.99\")   Wt 88 kg (194 lb)   BMI 25.60 kg/m       Constitutional:  cooperative, alert and oriented, well developed, well nourished, in no acute distress        Skin:  warm and dry to the touch, no apparent skin lesions or masses noted          Head:  normocephalic, no masses or lesions        Eyes:  pupils equal and round, conjunctivae and lids unremarkable, sclera white, no xanthalasma, EOMS intact, no nystagmus        Lymph:No Cervical lymphadenopathy present     ENT:  no pallor or cyanosis, dentition good        Neck:  carotid pulses are full and equal bilaterally, JVP normal, no carotid bruit        Respiratory:  normal breath sounds, clear to auscultation, normal A-P diameter, normal symmetry, normal respiratory excursion, no use of accessory muscles         Cardiac: regular rhythm, normal S1/S2, no S3 or S4, apical impulse not displaced, no murmurs, gallops or rubs             sinus andreia  pulses full and equal, no bruits auscultated                                        GI:           Extremities and Muscular Skeletal:  no deformities, clubbing, cyanosis, erythema observed;no edema              Neurological:  no gross motor deficits        Psych:  Alert and Oriented x 3      Recent Lab Results:  LIPID RESULTS:  Lab Results   Component Value Date    CHOL 138 05/21/2019    HDL 52 05/21/2019    LDL 72 05/21/2019    TRIG 69 05/21/2019       LIVER ENZYME RESULTS:  Lab Results   Component Value Date    ALT 12 05/21/2019 "       CBC RESULTS:  Lab Results   Component Value Date    WBC 4.8 01/23/2019    RBC 4.95 01/23/2019    HGB 14.9 01/23/2019    HCT 43.6 01/23/2019    MCV 88 01/23/2019    MCH 30.1 01/23/2019    MCHC 34.2 01/23/2019    RDW 12.7 01/23/2019     01/23/2019       BMP RESULTS:  Lab Results   Component Value Date     05/21/2019    POTASSIUM 3.7 05/21/2019    CHLORIDE 105 05/21/2019    CO2 27 05/21/2019    ANIONGAP 13.7 05/21/2019    GLC 98 05/21/2019    BUN 18 05/21/2019    CR 1.23 05/21/2019    GFRESTIMATED 62 05/21/2019    GFRESTBLACK 74 05/21/2019    KELY 10.0 05/21/2019        A1C RESULTS:  No results found for: A1C    INR RESULTS:  Lab Results   Component Value Date    INR 1.04 01/23/2019           CC  [facility]  [address] [address]    Thank you for allowing me to participate in the care of your patient.      Sincerely,     Jeramy Carter MD     [facility]    cc:   [facility]  [address] [id number]

## 2019-05-22 NOTE — LETTER
2019      Josué Vaughn MD  [facility] [address]  [facility] [address]      RE: Braulio Bunch       Dear Colleague,    I had the pleasure of seeing Braulio Bunch in the [facility].    Service Date: 2019      Josué Vaughn MD   [facility]    [address]   [place] [address]      RE: Braulio Bunch    MRN: [id number]   : 1965      Dear Dr. Vaughn:       I had the pleasure of following up on our mutual patient Braulio Bunch.  As you know, he is a delightful, very fit, 54-year-old gentleman.  You may recall he had a history of hyperlipidemia.  He had been on a statin, but self discontinued for a number of years.  You had noticed last year that his lipid profile was markedly elevated and suggested an exercise plan.  He took you to heart, and he unfortunately started his [employer] aerobic workout videotape and promptly had a myocardial infarction in the inferior wall, complicated by v fib cardiac arrest.  He was cardioverted back to sinus rhythm.  He continued to have episodes of torsades V tach, and we did emergency stenting to the left circumflex artery.  The LAD and the right coronary had only very mild disease, generally 30% or less.  The patient had some PVCs.  It is interesting he runs a somewhat low potassium, but he does not have hypertension, so this is not hyperaldosteronism.  He is not on any diuretics.  We went ahead and put him on a potassium pill.  His numbers came back reasonable, and with this better potassium, his PVCs are much less frequent.  We reviewed his angiogram today.  If you remember, there was a bit of a discrepancy between the echo EF and the angiogram EF.  He does have an inferior posterior infarct from his left circumflex MI.  His right coronary, although dominant, is much smaller than the left circumflex.  The patient is feeling well and exercising regularly.  Blood pressure was in the 1-teens  today, and heart rate was in the 50s.  His electrolytes and lipids are in Epic; you can see them.  His LDL is slightly higher at 72 with a goal of 70 or less.  I am not going to change his cholesterol pill for that, but we will recheck his cholesterol numbers in 6 months.  He does not need another electrolyte set until next year.  We went through the dual antiplatelet calculator to determine if it is beneficial to stay on Brilinta long term, and although the calculator suggests that there is some small benefit to being on the blood thinner (perhaps switching to Plavix, which is cheaper), I do not think it is necessary.  This was a large stent, and there was very minimal disease elsewhere, and there was a clear inciting event about what caused this current heart attack, i.e., the Insanity exercise program.  Therefore, I told him to finish up the current Brilinta.  He should wait several weeks before going for his colonoscopy.  We discussed coming off aspirin probably 5 days before the colonoscopy, but be off the Brilinta at least 4 weeks before that.  We will probably recommend an echocardiogram and repeat electrolytes and lipids in mid 2020, and again 6 months from now, we will repeat the lipids just to make sure the LDL does not climb further.  We talked extensively about exercise today and talked about aerobic versus isometric exercise.      Today's visit was 30 minutes, greater than 50% counseling.      Sincerely,      MD SUKHI Daley MD             D: 2019   T: 2019   MT: lisa      Name:     BRIEN KUHN   MRN:      [id number]        Account:      [id number]   :      1965           Service Date: 2019      Document: [id number]         Outpatient Encounter Medications as of 2019   Medication Sig Dispense Refill     aspirin 81 MG EC tablet Take 1 tablet (81 mg) by mouth daily 30 tablet 11     metoprolol succinate ER (TOPROL-XL) 25 MG 24 hr tablet Take  1 tablet (25 mg) by mouth At Bedtime 90 tablet 0     potassium chloride ER (K-DUR/KLOR-CON M) 10 MEQ CR tablet Take 1 tablet (10 mEq) by mouth daily 90 tablet 3     rosuvastatin (CRESTOR) 20 MG tablet Take 1 tablet (20 mg) by mouth daily 90 tablet 3     ticagrelor (BRILINTA) 90 MG tablet Take 1 tablet (90 mg) by mouth every 12 hours 180 tablet 3     VITAMIN D, CHOLECALCIFEROL, PO Take 1,000 Units by mouth daily        nitroGLYcerin (NITROSTAT) 0.4 MG sublingual tablet For chest pain place 1 tablet under the tongue every 5 minutes for 3 doses. If symptoms persist 5 minutes after 1st dose call 911. 25 tablet 3     No facility-administered encounter medications on file as of 5/22/2019.        Again, thank you for allowing me to participate in the care of your patient.      Sincerely,    Jeramy Carter MD     [facility]

## 2019-05-22 NOTE — PROGRESS NOTES
Service Date: 2019      Josué Vaughn MD   [facility]    [address]   [place] [address]      RE: Braulio Bunch    MRN: [id number]   : 1965      Dear Dr. Vaughn:       I had the pleasure of following up on our mutual patient Braulio Bunch.  As you know, he is a delightful, very fit, 54-year-old gentleman.  You may recall he had a history of hyperlipidemia.  He had been on a statin, but self discontinued for a number of years.  You had noticed last year that his lipid profile was markedly elevated and suggested an exercise plan.  He took you to heart, and he unfortunately started his Insanity aerobic workout videotape and promptly had a myocardial infarction in the inferior wall, complicated by v fib cardiac arrest.  He was cardioverted back to sinus rhythm.  He continued to have episodes of torsades V tach, and we did emergency stenting to the left circumflex artery.  The LAD and the right coronary had only very mild disease, generally 30% or less.  The patient had some PVCs.  It is interesting he runs a somewhat low potassium, but he does not have hypertension, so this is not hyperaldosteronism.  He is not on any diuretics.  We went ahead and put him on a potassium pill.  His numbers came back reasonable, and with this better potassium, his PVCs are much less frequent.  We reviewed his angiogram today.  If you remember, there was a bit of a discrepancy between the echo EF and the angiogram EF.  He does have an inferior posterior infarct from his left circumflex MI.  His right coronary, although dominant, is much smaller than the left circumflex.  The patient is feeling well and exercising regularly.  Blood pressure was in the 1-teens today, and heart rate was in the 50s.  His electrolytes and lipids are in Epic; you can see them.  His LDL is slightly higher at 72 with a goal of 70 or less.  I am not going to change his cholesterol pill for that, but we will recheck his  cholesterol numbers in 6 months.  He does not need another electrolyte set until next year.  We went through the dual antiplatelet calculator to determine if it is beneficial to stay on Brilinta long term, and although the calculator suggests that there is some small benefit to being on the blood thinner (perhaps switching to Plavix, which is cheaper), I do not think it is necessary.  This was a large stent, and there was very minimal disease elsewhere, and there was a clear inciting event about what caused this current heart attack, i.e., the Insanity exercise program.  Therefore, I told him to finish up the current Brilinta.  He should wait several weeks before going for his colonoscopy.  We discussed coming off aspirin probably 5 days before the colonoscopy, but be off the Brilinta at least 4 weeks before that.  We will probably recommend an echocardiogram and repeat electrolytes and lipids in mid 2020, and again 6 months from now, we will repeat the lipids just to make sure the LDL does not climb further.  We talked extensively about exercise today and talked about aerobic versus isometric exercise.      Today's visit was 30 minutes, greater than 50% counseling.      Sincerely,      MD SUKHI Daley MD             D: 2019   T: 2019   MT: lisa      Name:     BRIEN KUHN   MRN:      [id number]        Account:      [id number]   :      1965           Service Date: 2019      Document: [id number]

## 2019-05-22 NOTE — PROGRESS NOTES
HPI and Plan:   See dictation    Orders Placed This Encounter   Procedures     Lipid Profile     Follow-Up with Cardiac Advanced Practice Provider     EKG 12-lead complete w/read - Clinics (performed today)     No orders of the defined types were placed in this encounter.    There are no discontinued medications.      Encounter Diagnoses   Name Primary?     Coronary artery disease involving native coronary artery of native heart without angina pectoris      Palpitations Yes       CURRENT MEDICATIONS:  Current Outpatient Medications   Medication Sig Dispense Refill     aspirin 81 MG EC tablet Take 1 tablet (81 mg) by mouth daily 30 tablet 11     metoprolol succinate ER (TOPROL-XL) 25 MG 24 hr tablet Take 1 tablet (25 mg) by mouth At Bedtime 90 tablet 0     potassium chloride ER (K-DUR/KLOR-CON M) 10 MEQ CR tablet Take 1 tablet (10 mEq) by mouth daily 90 tablet 3     rosuvastatin (CRESTOR) 20 MG tablet Take 1 tablet (20 mg) by mouth daily 90 tablet 3     ticagrelor (BRILINTA) 90 MG tablet Take 1 tablet (90 mg) by mouth every 12 hours 180 tablet 3     VITAMIN D, CHOLECALCIFEROL, PO Take 1,000 Units by mouth daily        nitroGLYcerin (NITROSTAT) 0.4 MG sublingual tablet For chest pain place 1 tablet under the tongue every 5 minutes for 3 doses. If symptoms persist 5 minutes after 1st dose call 911. 25 tablet 3       ALLERGIES     Allergies   Allergen Reactions     Augmentin      Coreg [Carvedilol]      Brain fog       PAST MEDICAL HISTORY:  Past Medical History:   Diagnosis Date     Acute myocardial infarction (H) 5/13/2018     CAD (coronary artery disease)     Cath 5/10/18- PCI with revascularization to OM- EVELYN placed; jailing of OM-1--dilated lad 30%; mild disease elsewhere     Hyperlipidemia      Kidney calculi      Migraine      Raynaud's disease      STEMI (ST elevation myocardial infarction) (H) 5/10/2018     Ventricular fibrillation (H)     5/10/18 in ER     Vitamin D deficiency        PAST SURGICAL  HISTORY:  Past Surgical History:   Procedure Laterality Date     CV HEART CATHETERIZATION WITH POSSIBLE INTERVENTION N/A 1/23/2019    Procedure: Coronary Angiogram with OCT and IVUS to RCA for ulcerated plaque in CTA;  Surgeon: Jeramy Carter MD;  Location:  HEART CARDIAC CATH LAB     PARATHYROIDECTOMY         FAMILY HISTORY:  Family History   Problem Relation Age of Onset     Arrhythmia Mother      Hyperlipidemia Mother      Hypertension Father      Myocardial Infarction Father 81       SOCIAL HISTORY:  Social History     Socioeconomic History     Marital status:      Spouse name: None     Number of children: None     Years of education: None     Highest education level: None   Occupational History     None   Social Needs     Financial resource strain: None     Food insecurity:     Worry: None     Inability: None     Transportation needs:     Medical: None     Non-medical: None   Tobacco Use     Smoking status: Never Smoker     Smokeless tobacco: Never Used   Substance and Sexual Activity     Alcohol use: Yes     Comment: rare     Drug use: No     Sexual activity: None   Lifestyle     Physical activity:     Days per week: None     Minutes per session: None     Stress: None   Relationships     Social connections:     Talks on phone: None     Gets together: None     Attends [employer] service: None     Active member of club or organization: None     Attends meetings of clubs or organizations: None     Relationship status: None     Intimate partner violence:     Fear of current or ex partner: None     Emotionally abused: None     Physically abused: None     Forced sexual activity: None   Other Topics Concern     Parent/sibling w/ CABG, MI or angioplasty before 65F 55M? No   Social History Narrative     None       Review of Systems:  Skin:  Negative     Eyes:  Positive for glasses  ENT:  Negative    Respiratory:  Negative    Cardiovascular:  Negative palpitations;Positive for  Gastroenterology: Negative   "  Genitourinary:  not assessed    Musculoskeletal:  Negative    Neurologic:  Positive for (numbness in left leg into foot)  Psychiatric:  Negative    Heme/Lymph/Imm:  Positive for allergies  Endocrine:  Positive for      Physical Exam:  Vitals: Ht 1.854 m (6' 0.99\")   Wt 88 kg (194 lb)   BMI 25.60 kg/m      Constitutional:  cooperative, alert and oriented, well developed, well nourished, in no acute distress        Skin:  warm and dry to the touch, no apparent skin lesions or masses noted          Head:  normocephalic, no masses or lesions        Eyes:  pupils equal and round, conjunctivae and lids unremarkable, sclera white, no xanthalasma, EOMS intact, no nystagmus        Lymph:No Cervical lymphadenopathy present     ENT:  no pallor or cyanosis, dentition good        Neck:  carotid pulses are full and equal bilaterally, JVP normal, no carotid bruit        Respiratory:  normal breath sounds, clear to auscultation, normal A-P diameter, normal symmetry, normal respiratory excursion, no use of accessory muscles         Cardiac: regular rhythm, normal S1/S2, no S3 or S4, apical impulse not displaced, no murmurs, gallops or rubs             sinus andreia  pulses full and equal, no bruits auscultated                                        GI:           Extremities and Muscular Skeletal:  no deformities, clubbing, cyanosis, erythema observed;no edema              Neurological:  no gross motor deficits        Psych:  Alert and Oriented x 3      Recent Lab Results:  LIPID RESULTS:  Lab Results   Component Value Date    CHOL 138 05/21/2019    HDL 52 05/21/2019    LDL 72 05/21/2019    TRIG 69 05/21/2019       LIVER ENZYME RESULTS:  Lab Results   Component Value Date    ALT 12 05/21/2019       CBC RESULTS:  Lab Results   Component Value Date    WBC 4.8 01/23/2019    RBC 4.95 01/23/2019    HGB 14.9 01/23/2019    HCT 43.6 01/23/2019    MCV 88 01/23/2019    MCH 30.1 01/23/2019    MCHC 34.2 01/23/2019    RDW 12.7 01/23/2019    PLT " 225 01/23/2019       BMP RESULTS:  Lab Results   Component Value Date     05/21/2019    POTASSIUM 3.7 05/21/2019    CHLORIDE 105 05/21/2019    CO2 27 05/21/2019    ANIONGAP 13.7 05/21/2019    GLC 98 05/21/2019    BUN 18 05/21/2019    CR 1.23 05/21/2019    GFRESTIMATED 62 05/21/2019    GFRESTBLACK 74 05/21/2019    KELY 10.0 05/21/2019        A1C RESULTS:  No results found for: A1C    INR RESULTS:  Lab Results   Component Value Date    INR 1.04 01/23/2019           CC  [facility]  [id number] [id number]

## 2019-06-03 DIAGNOSIS — I21.19 ST ELEVATION MYOCARDIAL INFARCTION (STEMI) INVOLVING OTHER CORONARY ARTERY OF INFERIOR WALL (H): ICD-10-CM

## 2019-06-03 RX ORDER — NITROGLYCERIN 0.4 MG/1
TABLET SUBLINGUAL
Qty: 25 TABLET | Refills: 3 | Status: SHIPPED | OUTPATIENT
Start: 2019-06-03 | End: 2020-05-21

## 2019-07-31 DIAGNOSIS — I21.19 ST ELEVATION MYOCARDIAL INFARCTION (STEMI) INVOLVING OTHER CORONARY ARTERY OF INFERIOR WALL (H): ICD-10-CM

## 2019-07-31 RX ORDER — METOPROLOL SUCCINATE 25 MG/1
25 TABLET, EXTENDED RELEASE ORAL AT BEDTIME
Qty: 90 TABLET | Refills: 3 | Status: SHIPPED | OUTPATIENT
Start: 2019-07-31 | End: 2019-11-05

## 2019-07-31 RX ORDER — ROSUVASTATIN CALCIUM 20 MG/1
20 TABLET, COATED ORAL DAILY
Qty: 90 TABLET | Refills: 3 | Status: SHIPPED | OUTPATIENT
Start: 2019-07-31 | End: 2020-05-21

## 2019-11-05 ENCOUNTER — DOCUMENTATION ONLY (OUTPATIENT)
Dept: CARDIOLOGY | Facility: CLINIC | Age: 54
End: 2019-11-05

## 2019-11-05 ENCOUNTER — OFFICE VISIT (OUTPATIENT)
Dept: CARDIOLOGY | Facility: CLINIC | Age: 54
End: 2019-11-05
Payer: COMMERCIAL

## 2019-11-05 VITALS
BODY MASS INDEX: 25.98 KG/M2 | SYSTOLIC BLOOD PRESSURE: 110 MMHG | HEART RATE: 64 BPM | DIASTOLIC BLOOD PRESSURE: 71 MMHG | HEIGHT: 73 IN | WEIGHT: 196 LBS

## 2019-11-05 DIAGNOSIS — I25.10 CORONARY ARTERY DISEASE INVOLVING NATIVE CORONARY ARTERY OF NATIVE HEART WITHOUT ANGINA PECTORIS: ICD-10-CM

## 2019-11-05 LAB
ANION GAP SERPL CALCULATED.3IONS-SCNC: 8.8 MMOL/L (ref 6–17)
BUN SERPL-MCNC: 19 MG/DL (ref 7–30)
CALCIUM SERPL-MCNC: 9.3 MG/DL (ref 8.5–10.1)
CHLORIDE SERPL-SCNC: 108 MMOL/L (ref 94–109)
CHOLEST SERPL-MCNC: 144 MG/DL
CO2 SERPL-SCNC: 27 MMOL/L (ref 20–32)
CREAT SERPL-MCNC: 1.1 MG/DL (ref 0.66–1.25)
GFR SERPL CREATININE-BSD FRML MDRD: 70 ML/MIN/{1.73_M2}
GLUCOSE SERPL-MCNC: 89 MG/DL (ref 70–99)
HDLC SERPL-MCNC: 59 MG/DL
LDLC SERPL CALC-MCNC: 72 MG/DL
NONHDLC SERPL-MCNC: 85 MG/DL
POTASSIUM SERPL-SCNC: 3.8 MMOL/L (ref 3.4–5.3)
SODIUM SERPL-SCNC: 140 MMOL/L (ref 133–144)
TRIGL SERPL-MCNC: 63 MG/DL

## 2019-11-05 PROCEDURE — 80048 BASIC METABOLIC PNL TOTAL CA: CPT | Performed by: INTERNAL MEDICINE

## 2019-11-05 PROCEDURE — 36415 COLL VENOUS BLD VENIPUNCTURE: CPT | Performed by: INTERNAL MEDICINE

## 2019-11-05 PROCEDURE — 80061 LIPID PANEL: CPT | Performed by: INTERNAL MEDICINE

## 2019-11-05 PROCEDURE — 99214 OFFICE O/P EST MOD 30 MIN: CPT | Performed by: NURSE PRACTITIONER

## 2019-11-05 RX ORDER — HYDROCODONE BITARTRATE AND ACETAMINOPHEN 5; 325 MG/1; MG/1
1 TABLET ORAL PRN
COMMUNITY
End: 2020-08-03

## 2019-11-05 RX ORDER — NEBIVOLOL 5 MG/1
TABLET ORAL
Qty: 90 TABLET | Refills: 3 | Status: SHIPPED | OUTPATIENT
Start: 2019-11-05 | End: 2019-12-10

## 2019-11-05 ASSESSMENT — MIFFLIN-ST. JEOR: SCORE: 1787.93

## 2019-11-05 NOTE — PROGRESS NOTES
Added on a bmp to labs today    It is fine    I could not send a result note    Released in my chart so let him know it is fine    And I changed him from Metoprolol to Bystolic for fatigue and Raynaud's    He should be calling or messaging in a couple of weeks to let us know how he is doing    thanks

## 2019-11-05 NOTE — PROGRESS NOTES
Called and spoke with pt.  Discussed that LON Turong reviewed BMP done today and results look fine.  He verbalized understanding and denies any questions/cocerns.  Also reminded pt to call or send a MyChart update in a couple of weeks regarding how he is doing with switch from metoprolol to Bytolic- he verbalized understanding and states he will send MyChart update in 10 days-2weeks.    GREGORIO Childs 1:28 PM 11/5/2019

## 2019-11-05 NOTE — PATIENT INSTRUCTIONS
Let me know if you feel better on Bystolic 2.5mg daily (1/2 pill daily)    Either send a my chart message or call my nurse Madison at [phone]      See us back in May with echocardiogram and fasting labs

## 2019-11-05 NOTE — PROGRESS NOTES
"History of Present Illness:    Braulio Bunch is a 54 year old male followed here by Dr. Carter who is here for 6 month follow up.  We met him May 10th, 2018 when he presented with a STEMI.  He has a history of dyslipidemia and had stopped simvastatin for 4 years.  He saw his primary care prior to his MI and went down to his basement and began to do the \"insanity workout\" as he was advised to have lifestyle changes.  He began to have chest pain and was found to have an inferior wall MI while being transported to [facility] by ambulance.  He had a ventricular fibrillation arrest on the way to the Cath Lab.  He was shocked back to sinus rhythm but had torsades directly prior to the angiogram as well. He underwent a 4.0 x 60 mm Synergy drug-eluting stent into his occluded ostial OM 2 branch with jailing of OM-1 with PTCA.  He had a 30% LAD stenosis and the right coronary had mild irregularities He went on to have atypical chest discomfort afterwards which was treated with Ranexa and colchicine.  These have been discontinued.  He had undergone ischemic workups since his stents with no evidence of ischemia by stress echo or nuclear scans. On nuclear scan in July, 2018 his infero, inferolateral MI was apparent with very mild martha-infarct ischemia.    Echo at one point showed more prominent lateral, inferior and anterior RWMA. A CTA was done and looked suspicious for a possible ulcer crater/vulnerable plaque in the mid RCA. This prompted a repeat coronary angiogram using OCT. No concerning lesions were found. EF always appears lower on LV gram than echo. He has completed one month of DAPT.     Most recent echocardiogram reveals EF of 55-60% with mild RV dilatation. We have tried ACE in the past and his BP dropped; it dropped on Coreg as well.    Lipids: total cholesterol 144 HDL 59 LDL 72 TG 63    He has had partial parathyroidectomy in the past.    His only complaint is that he feels fatigued since being on " beta-blockers and he is starting to notice Raynaud's symptoms now that the weather is changing.     He has a history of PVCs in cardiac rehab.  A Ziopatch monitor was done showing accelerated idioventricular rhythm in the middle of night with some bradycardia during the night.  He denies any significant snoring history as does his wife.Overall these were reviewed by Dr. Carter and given LVEF of 35% or over, we did not pursue an EP consult. He notes occasional days of palpitations, not frequently.     He has worn an overnight oximetry to screen for sleep apnea and this was unremarkable.  BMP: sodium 144 potassium 3.8 BUN 19 Creatinine 1.1    Overall he routinely exercises and tolerates that well.     The remainder of the exam in unremarkable and outlined below.           Impression/Plan:      1.  Coronary artery disease withST segment elevation myocardial infarction in May of 2018 treated with drug-eluting stent to the circumflex  Last echo appeared to have more prominent WMA in inferolateral walls  cardimyopathy with EF 50% by echo; 35-40% by LV gram  -no crater or ulceration seen in RCA using OCT  -circumflex stents are widely patent  -He had non-flow-limiting disease elsewhere on invasive angiogram  -He had symptomatic low blood pressures therefore is not on lisinopril or arb  -completed one year of DAPT     2.  Ventricular fibrillation arrest on the way to the Cath Lab with intermittent torsades after he was defibrillated.  -No further arrhythmias  -Ziopatch noted PVC's but EF 40% or higher  -Continue beta-blockers      3.  Dyslipidemia controlled  -continue Crestor 20 mg a day  -recheck labs Spring     4.  Vitamin D deficiency on replacement through primary care     5. Mild kidney injury with chronic kidney disease post angiography.    -creatinine 1.1        6. Raynaud's phenomenon- intolerant to coreg as noted above.  See below    7. Fatigue-  -stop Metoprolol  -start Bystolic 2.5mg daily  -call my nurse with  an update in 2 weeks  -rx sent, samples given     7. Screening for sleep apnea with overnight oximetry  -results unremarkable--pt notified after the visit      8. Hypokalemia--mild  -he eats a potassium laden diet  -has hx of pararthyroid surgery  -potassium 3.8 today  -continue KCL 10meq daily      It has been a pleasure seeing Braulio Bunch in follow up-we will see him in 6 months with an echocardiogram.    Greater than 50% of this 30 minute visit was spent in counseling    Floresita Tyson, MSN, APRN-BC, CNP  Cardiology    Orders Placed This Encounter   Procedures     Basic metabolic panel     Basic metabolic panel     Lipid Profile     ALT     Follow-Up with Cardiologist     Echocardiogram Complete     Orders Placed This Encounter   Medications     HYDROcodone-acetaminophen (NORCO) 5-325 MG tablet     Sig: Take 1 tablet by mouth as needed for severe pain     nebivolol (BYSTOLIC) 5 MG tablet     Si/2 tablet daily     Dispense:  90 tablet     Refill:  3     He will call when he needs this filled     Medications Discontinued During This Encounter   Medication Reason     ticagrelor (BRILINTA) 90 MG tablet Discontinued by another Health Care Provider     metoprolol succinate ER (TOPROL-XL) 25 MG 24 hr tablet          Encounter Diagnosis   Name Primary?     Coronary artery disease involving native coronary artery of native heart without angina pectoris        CURRENT MEDICATIONS:  Current Outpatient Medications   Medication Sig Dispense Refill     aspirin 81 MG EC tablet Take 1 tablet (81 mg) by mouth daily 30 tablet 11     HYDROcodone-acetaminophen (NORCO) 5-325 MG tablet Take 1 tablet by mouth as needed for severe pain       nebivolol (BYSTOLIC) 5 MG tablet 1/2 tablet daily 90 tablet 3     nitroGLYcerin (NITROSTAT) 0.4 MG sublingual tablet For chest pain place 1 tablet under the tongue every 5 minutes for 3 doses. If symptoms persist 5 minutes after 1st dose call 911. 25 tablet 3     potassium chloride ER  (K-DUR/KLOR-CON M) 10 MEQ CR tablet Take 1 tablet (10 mEq) by mouth daily 90 tablet 3     rosuvastatin (CRESTOR) 20 MG tablet Take 1 tablet (20 mg) by mouth daily 90 tablet 3     VITAMIN D, CHOLECALCIFEROL, PO Take 1,000 Units by mouth daily          ALLERGIES     Allergies   Allergen Reactions     Augmentin      Coreg [Carvedilol]      Brain fog     Metoprolol      fatigue       PAST MEDICAL HISTORY:  Past Medical History:   Diagnosis Date     Acute myocardial infarction (H) 5/13/2018     CAD (coronary artery disease)     Cath 5/10/18- PCI with revascularization to OM- EVELYN placed; jailing of OM-1--dilated lad 30%; mild disease elsewhere     Hyperlipidemia      Kidney calculi      Migraine      Raynaud's disease      STEMI (ST elevation myocardial infarction) (H) 5/10/2018     Ventricular fibrillation (H)     5/10/18 in ER     Vitamin D deficiency        PAST SURGICAL HISTORY:  Past Surgical History:   Procedure Laterality Date     CV HEART CATHETERIZATION WITH POSSIBLE INTERVENTION N/A 1/23/2019    Procedure: Coronary Angiogram with OCT and IVUS to RCA for ulcerated plaque in CTA;  Surgeon: Jeramy Carter MD;  Location:  HEART CARDIAC CATH LAB     PARATHYROIDECTOMY         FAMILY HISTORY:  Family History   Problem Relation Age of Onset     Arrhythmia Mother      Hyperlipidemia Mother      Hypertension Father      Myocardial Infarction Father 81       SOCIAL HISTORY:  Social History     Socioeconomic History     Marital status:      Spouse name: None     Number of children: None     Years of education: None     Highest education level: None   Occupational History     None   Social Needs     Financial resource strain: None     Food insecurity:     Worry: None     Inability: None     Transportation needs:     Medical: None     Non-medical: None   Tobacco Use     Smoking status: Never Smoker     Smokeless tobacco: Never Used   Substance and Sexual Activity     Alcohol use: Yes     Comment: rare     Drug  "use: No     Sexual activity: None   Lifestyle     Physical activity:     Days per week: None     Minutes per session: None     Stress: None   Relationships     Social connections:     Talks on phone: None     Gets together: None     Attends Lutheran service: None     Active member of club or organization: None     Attends meetings of clubs or organizations: None     Relationship status: None     Intimate partner violence:     Fear of current or ex partner: None     Emotionally abused: None     Physically abused: None     Forced sexual activity: None   Other Topics Concern     Parent/sibling w/ CABG, MI or angioplasty before 65F 55M? No   Social History Narrative     None       Review of Systems:  Skin:  Negative       Eyes:  Positive for glasses    ENT:  Negative      Respiratory:  Negative       Cardiovascular:  Negative Positive for;chest pain;palpitations;dizziness CP- 1 month ago- 1 episode while working out while on the rowing machine--resolved within  minutes and went to ellipitical with no pain  Gastroenterology: Negative      Genitourinary:  not assessed      Musculoskeletal:  Negative      Neurologic:  Positive for migraine headaches(numbness in left leg into foot)    Psychiatric:  Negative      Heme/Lymph/Imm:  Positive for allergies    Endocrine:  Positive for   Has Reynaud's    Physical Exam:  Vitals: /71   Pulse 64   Ht 1.854 m (6' 1\")   Wt 88.9 kg (196 lb)   BMI 25.86 kg/m      Constitutional:  cooperative, alert and oriented, well developed, well nourished, in no acute distress        Skin:  warm and dry to the touch, no apparent skin lesions or masses noted          Head:  normocephalic, no masses or lesions        Eyes:  pupils equal and round, conjunctivae and lids unremarkable, sclera white, no xanthalasma, EOMS intact, no nystagmus        Lymph:No Cervical lymphadenopathy present     ENT:  no pallor or cyanosis, dentition good        Neck:  carotid pulses are full and equal " bilaterally, JVP normal, no carotid bruit        Respiratory:  normal breath sounds, clear to auscultation, normal A-P diameter, normal symmetry, normal respiratory excursion, no use of accessory muscles         Cardiac: regular rhythm, normal S1/S2, no S3 or S4, apical impulse not displaced, no murmurs, gallops or rubs   S4         sinus andreia  pulses full and equal, no bruits auscultated                                   right groin    GI:           Extremities and Muscular Skeletal:  no deformities, clubbing, cyanosis, erythema observed;no edema              Neurological:  no gross motor deficits        Psych:  Alert and Oriented x 3      Recent Lab Results:  LIPID RESULTS:  Lab Results   Component Value Date    CHOL 144 11/05/2019    HDL 59 11/05/2019    LDL 72 11/05/2019    TRIG 63 11/05/2019       LIVER ENZYME RESULTS:  Lab Results   Component Value Date    ALT 12 05/21/2019       CBC RESULTS:  Lab Results   Component Value Date    WBC 4.8 01/23/2019    RBC 4.95 01/23/2019    HGB 14.9 01/23/2019    HCT 43.6 01/23/2019    MCV 88 01/23/2019    MCH 30.1 01/23/2019    MCHC 34.2 01/23/2019    RDW 12.7 01/23/2019     01/23/2019       BMP RESULTS:  Lab Results   Component Value Date     05/21/2019    POTASSIUM 3.7 05/21/2019    CHLORIDE 105 05/21/2019    CO2 27 05/21/2019    ANIONGAP 13.7 05/21/2019    GLC 98 05/21/2019    BUN 18 05/21/2019    CR 1.23 05/21/2019    GFRESTIMATED 62 05/21/2019    GFRESTBLACK 74 05/21/2019    KELY 10.0 05/21/2019        A1C RESULTS:  No results found for: A1C    INR RESULTS:  Lab Results   Component Value Date    INR 1.04 01/23/2019           CC  Jeramy Carter MD  [id number]  [place] [id number]

## 2019-11-05 NOTE — LETTER
"11/5/2019    Josué Vaughn MD  [facility] [address]  [facility] [address]    RE: Braulio Bunch       Dear Colleague,    I had the pleasure of seeing Braulio Bunch in the [facility].    History of Present Illness:    Braulio Bunch is a 54 year old male followed here by Dr. Carter who is here for 6 month follow up.  We met him May 10th, 2018 when he presented with a STEMI.  He has a history of dyslipidemia and had stopped simvastatin for 4 years.  He saw his primary care prior to his MI and went down to his basement and began to do the \"insanity workout\" as he was advised to have lifestyle changes.  He began to have chest pain and was found to have an inferior wall MI while being transported to [facility] by ambulance.  He had a ventricular fibrillation arrest on the way to the Cath Lab.  He was shocked back to sinus rhythm but had torsades directly prior to the angiogram as well. He underwent a 4.0 x 60 mm Synergy drug-eluting stent into his occluded ostial OM 2 branch with jailing of OM-1 with PTCA.  He had a 30% LAD stenosis and the right coronary had mild irregularities He went on to have atypical chest discomfort afterwards which was treated with Ranexa and colchicine.  These have been discontinued.  He had undergone ischemic workups since his stents with no evidence of ischemia by stress echo or nuclear scans. On nuclear scan in July, 2018 his infero, inferolateral MI was apparent with very mild martha-infarct ischemia.    Echo at one point showed more prominent lateral, inferior and anterior RWMA. A CTA was done and looked suspicious for a possible ulcer crater/vulnerable plaque in the mid RCA. This prompted a repeat coronary angiogram using OCT. No concerning lesions were found. EF always appears lower on LV gram than echo. He has completed one month of DAPT.     Most recent echocardiogram reveals EF of 55-60% with mild RV dilatation. We have tried " ACE in the past and his BP dropped; it dropped on Coreg as well.    Lipids: total cholesterol 144 HDL 59 LDL 72 TG 63    He has had partial parathyroidectomy in the past.    His only complaint is that he feels fatigued since being on beta-blockers and he is starting to notice Raynaud's symptoms now that the weather is changing.     He has a history of PVCs in cardiac rehab.  A Ziopatch monitor was done showing accelerated idioventricular rhythm in the middle of night with some bradycardia during the night.  He denies any significant snoring history as does his wife.Overall these were reviewed by Dr. Carter and given LVEF of 35% or over, we did not pursue an EP consult. He notes occasional days of palpitations, not frequently.     He has worn an overnight oximetry to screen for sleep apnea and this was unremarkable.  BMP: sodium 144 potassium 3.8 BUN 19 Creatinine 1.1    Overall he routinely exercises and tolerates that well.     The remainder of the exam in unremarkable and outlined below.           Impression/Plan:      1.  Coronary artery disease withST segment elevation myocardial infarction in May of 2018 treated with drug-eluting stent to the circumflex  Last echo appeared to have more prominent WMA in inferolateral walls  cardimyopathy with EF 50% by echo; 35-40% by LV gram  -no crater or ulceration seen in RCA using OCT  -circumflex stents are widely patent  -He had non-flow-limiting disease elsewhere on invasive angiogram  -He had symptomatic low blood pressures therefore is not on lisinopril or arb  -completed one year of DAPT     2.  Ventricular fibrillation arrest on the way to the Cath Lab with intermittent torsades after he was defibrillated.  -No further arrhythmias  -Ziopatch noted PVC's but EF 40% or higher  -Continue beta-blockers      3.  Dyslipidemia controlled  -continue Crestor 20 mg a day  -recheck labs Spring     4.  Vitamin D deficiency on replacement through primary care     5. Mild kidney  injury with chronic kidney disease post angiography.    -creatinine 1.1        6. Raynaud's phenomenon- intolerant to coreg as noted above.  See below    7. Fatigue-  -stop Metoprolol  -start Bystolic 2.5mg daily  -call my nurse with an update in 2 weeks  -rx sent, samples given     7. Screening for sleep apnea with overnight oximetry  -results unremarkable--pt notified after the visit      8. Hypokalemia--mild  -he eats a potassium laden diet  -has hx of pararthyroid surgery  -potassium 3.8 today  -continue KCL 10meq daily      It has been a pleasure seeing Braulio JORDAN Alivia in follow up-we will see him in 6 months with an echocardiogram.    Greater than 50% of this 30 minute visit was spent in counseling    Floresita Tyson, MSN, APRN-BC, CNP  Cardiology    Orders Placed This Encounter   Procedures     Basic metabolic panel     Basic metabolic panel     Lipid Profile     ALT     Follow-Up with Cardiologist     Echocardiogram Complete     Orders Placed This Encounter   Medications     HYDROcodone-acetaminophen (NORCO) 5-325 MG tablet     Sig: Take 1 tablet by mouth as needed for severe pain     nebivolol (BYSTOLIC) 5 MG tablet     Si/2 tablet daily     Dispense:  90 tablet     Refill:  3     He will call when he needs this filled     Medications Discontinued During This Encounter   Medication Reason     ticagrelor (BRILINTA) 90 MG tablet Discontinued by another Health Care Provider     metoprolol succinate ER (TOPROL-XL) 25 MG 24 hr tablet          Encounter Diagnosis   Name Primary?     Coronary artery disease involving native coronary artery of native heart without angina pectoris        CURRENT MEDICATIONS:  Current Outpatient Medications   Medication Sig Dispense Refill     aspirin 81 MG EC tablet Take 1 tablet (81 mg) by mouth daily 30 tablet 11     HYDROcodone-acetaminophen (NORCO) 5-325 MG tablet Take 1 tablet by mouth as needed for severe pain       nebivolol (BYSTOLIC) 5 MG tablet 1/2 tablet daily 90  tablet 3     nitroGLYcerin (NITROSTAT) 0.4 MG sublingual tablet For chest pain place 1 tablet under the tongue every 5 minutes for 3 doses. If symptoms persist 5 minutes after 1st dose call 911. 25 tablet 3     potassium chloride ER (K-DUR/KLOR-CON M) 10 MEQ CR tablet Take 1 tablet (10 mEq) by mouth daily 90 tablet 3     rosuvastatin (CRESTOR) 20 MG tablet Take 1 tablet (20 mg) by mouth daily 90 tablet 3     VITAMIN D, CHOLECALCIFEROL, PO Take 1,000 Units by mouth daily          ALLERGIES     Allergies   Allergen Reactions     Augmentin      Coreg [Carvedilol]      Brain fog     Metoprolol      fatigue       PAST MEDICAL HISTORY:  Past Medical History:   Diagnosis Date     Acute myocardial infarction (H) 5/13/2018     CAD (coronary artery disease)     Cath 5/10/18- PCI with revascularization to OM- EVELYN placed; jailing of OM-1--dilated lad 30%; mild disease elsewhere     Hyperlipidemia      Kidney calculi      Migraine      Raynaud's disease      STEMI (ST elevation myocardial infarction) (H) 5/10/2018     Ventricular fibrillation (H)     5/10/18 in ER     Vitamin D deficiency        PAST SURGICAL HISTORY:  Past Surgical History:   Procedure Laterality Date     CV HEART CATHETERIZATION WITH POSSIBLE INTERVENTION N/A 1/23/2019    Procedure: Coronary Angiogram with OCT and IVUS to RCA for ulcerated plaque in CTA;  Surgeon: Jeramy Carter MD;  Location:  HEART CARDIAC CATH LAB     PARATHYROIDECTOMY         FAMILY HISTORY:  Family History   Problem Relation Age of Onset     Arrhythmia Mother      Hyperlipidemia Mother      Hypertension Father      Myocardial Infarction Father 81       SOCIAL HISTORY:  Social History     Socioeconomic History     Marital status:      Spouse name: None     Number of children: None     Years of education: None     Highest education level: None   Occupational History     None   Social Needs     Financial resource strain: None     Food insecurity:     Worry: None     Inability:  "None     Transportation needs:     Medical: None     Non-medical: None   Tobacco Use     Smoking status: Never Smoker     Smokeless tobacco: Never Used   Substance and Sexual Activity     Alcohol use: Yes     Comment: rare     Drug use: No     Sexual activity: None   Lifestyle     Physical activity:     Days per week: None     Minutes per session: None     Stress: None   Relationships     Social connections:     Talks on phone: None     Gets together: None     Attends Scientologist service: None     Active member of club or organization: None     Attends meetings of clubs or organizations: None     Relationship status: None     Intimate partner violence:     Fear of current or ex partner: None     Emotionally abused: None     Physically abused: None     Forced sexual activity: None   Other Topics Concern     Parent/sibling w/ CABG, MI or angioplasty before 65F 55M? No   Social History Narrative     None       Review of Systems:  Skin:  Negative       Eyes:  Positive for glasses    ENT:  Negative      Respiratory:  Negative       Cardiovascular:  Negative Positive for;chest pain;palpitations;dizziness CP- 1 month ago- 1 episode while working out while on the rowing machine--resolved within  minutes and went to ellipitical with no pain  Gastroenterology: Negative      Genitourinary:  not assessed      Musculoskeletal:  Negative      Neurologic:  Positive for migraine headaches(numbness in left leg into foot)    Psychiatric:  Negative      Heme/Lymph/Imm:  Positive for allergies    Endocrine:  Positive for   Has Reynaud's    Physical Exam:  Vitals: /71   Pulse 64   Ht 1.854 m (6' 1\")   Wt 88.9 kg (196 lb)   BMI 25.86 kg/m       Constitutional:  cooperative, alert and oriented, well developed, well nourished, in no acute distress        Skin:  warm and dry to the touch, no apparent skin lesions or masses noted          Head:  normocephalic, no masses or lesions        Eyes:  pupils equal and round, conjunctivae " and lids unremarkable, sclera white, no xanthalasma, EOMS intact, no nystagmus        Lymph:No Cervical lymphadenopathy present     ENT:  no pallor or cyanosis, dentition good        Neck:  carotid pulses are full and equal bilaterally, JVP normal, no carotid bruit        Respiratory:  normal breath sounds, clear to auscultation, normal A-P diameter, normal symmetry, normal respiratory excursion, no use of accessory muscles         Cardiac: regular rhythm, normal S1/S2, no S3 or S4, apical impulse not displaced, no murmurs, gallops or rubs   S4         sinus andreia  pulses full and equal, no bruits auscultated                                   right groin    GI:           Extremities and Muscular Skeletal:  no deformities, clubbing, cyanosis, erythema observed;no edema              Neurological:  no gross motor deficits        Psych:  Alert and Oriented x 3      Recent Lab Results:  LIPID RESULTS:  Lab Results   Component Value Date    CHOL 144 11/05/2019    HDL 59 11/05/2019    LDL 72 11/05/2019    TRIG 63 11/05/2019       LIVER ENZYME RESULTS:  Lab Results   Component Value Date    ALT 12 05/21/2019       CBC RESULTS:  Lab Results   Component Value Date    WBC 4.8 01/23/2019    RBC 4.95 01/23/2019    HGB 14.9 01/23/2019    HCT 43.6 01/23/2019    MCV 88 01/23/2019    MCH 30.1 01/23/2019    MCHC 34.2 01/23/2019    RDW 12.7 01/23/2019     01/23/2019       BMP RESULTS:  Lab Results   Component Value Date     05/21/2019    POTASSIUM 3.7 05/21/2019    CHLORIDE 105 05/21/2019    CO2 27 05/21/2019    ANIONGAP 13.7 05/21/2019    GLC 98 05/21/2019    BUN 18 05/21/2019    CR 1.23 05/21/2019    GFRESTIMATED 62 05/21/2019    GFRESTBLACK 74 05/21/2019    KELY 10.0 05/21/2019        A1C RESULTS:  No results found for: A1C    INR RESULTS:  Lab Results   Component Value Date    INR 1.04 01/23/2019         Thank you for allowing me to participate in the care of your patient.    Sincerely,     ENRIKE Crocker  CNP     [facility]

## 2019-11-05 NOTE — LETTER
"11/5/2019    Josué Vaughn MD  [facility] [address]  [facility] [address]    RE: Braulio Bunch       Dear Colleague,    I had the pleasure of seeing Braulio Bunch in the [facility].    History of Present Illness:    Braulio Bunch is a 54 year old male followed here by Dr. Carter who is here for 6 month follow up.  We met him May 10th, 2018 when he presented with a STEMI.  He has a history of dyslipidemia and had stopped simvastatin for 4 years.  He saw his primary care prior to his MI and went down to his basement and began to do the \"insanity workout\" as he was advised to have lifestyle changes.  He began to have chest pain and was found to have an inferior wall MI while being transported to [facility] by ambulance.  He had a ventricular fibrillation arrest on the way to the Cath Lab.  He was shocked back to sinus rhythm but had torsades directly prior to the angiogram as well. He underwent a 4.0 x 60 mm Synergy drug-eluting stent into his occluded ostial OM 2 branch with jailing of OM-1 with PTCA.  He had a 30% LAD stenosis and the right coronary had mild irregularities He went on to have atypical chest discomfort afterwards which was treated with Ranexa and colchicine.  These have been discontinued.  He had undergone ischemic workups since his stents with no evidence of ischemia by stress echo or nuclear scans. On nuclear scan in July, 2018 his infero, inferolateral MI was apparent with very mild martha-infarct ischemia.    Echo at one point showed more prominent lateral, inferior and anterior RWMA. A CTA was done and looked suspicious for a possible ulcer crater/vulnerable plaque in the mid RCA. This prompted a repeat coronary angiogram using OCT. No concerning lesions were found. EF always appears lower on LV gram than echo. He has completed one month of DAPT.     Most recent echocardiogram reveals EF of 55-60% with mild RV dilatation. We have tried " ACE in the past and his BP dropped; it dropped on Coreg as well.    Lipids: total cholesterol 144 HDL 59 LDL 72 TG 63    He has had partial parathyroidectomy in the past.    His only complaint is that he feels fatigued since being on beta-blockers and he is starting to notice Raynaud's symptoms now that the weather is changing.     He has a history of PVCs in cardiac rehab.  A Ziopatch monitor was done showing accelerated idioventricular rhythm in the middle of night with some bradycardia during the night.  He denies any significant snoring history as does his wife.Overall these were reviewed by Dr. Caretr and given LVEF of 35% or over, we did not pursue an EP consult. He notes occasional days of palpitations, not frequently.     He has worn an overnight oximetry to screen for sleep apnea and this was unremarkable.  BMP: sodium 144 potassium 3.8 BUN 19 Creatinine 1.1    Overall he routinely exercises and tolerates that well.     The remainder of the exam in unremarkable and outlined below.           Impression/Plan:      1.  Coronary artery disease withST segment elevation myocardial infarction in May of 2018 treated with drug-eluting stent to the circumflex  Last echo appeared to have more prominent WMA in inferolateral walls  cardimyopathy with EF 50% by echo; 35-40% by LV gram  -no crater or ulceration seen in RCA using OCT  -circumflex stents are widely patent  -He had non-flow-limiting disease elsewhere on invasive angiogram  -He had symptomatic low blood pressures therefore is not on lisinopril or arb  -completed one year of DAPT     2.  Ventricular fibrillation arrest on the way to the Cath Lab with intermittent torsades after he was defibrillated.  -No further arrhythmias  -Ziopatch noted PVC's but EF 40% or higher  -Continue beta-blockers      3.  Dyslipidemia controlled  -continue Crestor 20 mg a day  -recheck labs Spring     4.  Vitamin D deficiency on replacement through primary care     5. Mild kidney  injury with chronic kidney disease post angiography.    -creatinine 1.1        6. Raynaud's phenomenon- intolerant to coreg as noted above.  See below    7. Fatigue-  -stop Metoprolol  -start Bystolic 2.5mg daily  -call my nurse with an update in 2 weeks  -rx sent, samples given     7. Screening for sleep apnea with overnight oximetry  -results unremarkable--pt notified after the visit      8. Hypokalemia--mild  -he eats a potassium laden diet  -has hx of pararthyroid surgery  -potassium 3.8 today  -continue KCL 10meq daily      It has been a pleasure seeing Braulio JORDAN Alivia in follow up-we will see him in 6 months with an echocardiogram.    Greater than 50% of this 30 minute visit was spent in counseling    Floresita Tyson, MSN, APRN-BC, CNP  Cardiology    Orders Placed This Encounter   Procedures     Basic metabolic panel     Basic metabolic panel     Lipid Profile     ALT     Follow-Up with Cardiologist     Echocardiogram Complete     Orders Placed This Encounter   Medications     HYDROcodone-acetaminophen (NORCO) 5-325 MG tablet     Sig: Take 1 tablet by mouth as needed for severe pain     nebivolol (BYSTOLIC) 5 MG tablet     Si/2 tablet daily     Dispense:  90 tablet     Refill:  3     He will call when he needs this filled     Medications Discontinued During This Encounter   Medication Reason     ticagrelor (BRILINTA) 90 MG tablet Discontinued by another Health Care Provider     metoprolol succinate ER (TOPROL-XL) 25 MG 24 hr tablet          Encounter Diagnosis   Name Primary?     Coronary artery disease involving native coronary artery of native heart without angina pectoris        CURRENT MEDICATIONS:  Current Outpatient Medications   Medication Sig Dispense Refill     aspirin 81 MG EC tablet Take 1 tablet (81 mg) by mouth daily 30 tablet 11     HYDROcodone-acetaminophen (NORCO) 5-325 MG tablet Take 1 tablet by mouth as needed for severe pain       nebivolol (BYSTOLIC) 5 MG tablet 1/2 tablet daily 90  tablet 3     nitroGLYcerin (NITROSTAT) 0.4 MG sublingual tablet For chest pain place 1 tablet under the tongue every 5 minutes for 3 doses. If symptoms persist 5 minutes after 1st dose call 911. 25 tablet 3     potassium chloride ER (K-DUR/KLOR-CON M) 10 MEQ CR tablet Take 1 tablet (10 mEq) by mouth daily 90 tablet 3     rosuvastatin (CRESTOR) 20 MG tablet Take 1 tablet (20 mg) by mouth daily 90 tablet 3     VITAMIN D, CHOLECALCIFEROL, PO Take 1,000 Units by mouth daily          ALLERGIES     Allergies   Allergen Reactions     Augmentin      Coreg [Carvedilol]      Brain fog     Metoprolol      fatigue       PAST MEDICAL HISTORY:  Past Medical History:   Diagnosis Date     Acute myocardial infarction (H) 5/13/2018     CAD (coronary artery disease)     Cath 5/10/18- PCI with revascularization to OM- EVELYN placed; jailing of OM-1--dilated lad 30%; mild disease elsewhere     Hyperlipidemia      Kidney calculi      Migraine      Raynaud's disease      STEMI (ST elevation myocardial infarction) (H) 5/10/2018     Ventricular fibrillation (H)     5/10/18 in ER     Vitamin D deficiency        PAST SURGICAL HISTORY:  Past Surgical History:   Procedure Laterality Date     CV HEART CATHETERIZATION WITH POSSIBLE INTERVENTION N/A 1/23/2019    Procedure: Coronary Angiogram with OCT and IVUS to RCA for ulcerated plaque in CTA;  Surgeon: Jeramy Carter MD;  Location:  HEART CARDIAC CATH LAB     PARATHYROIDECTOMY         FAMILY HISTORY:  Family History   Problem Relation Age of Onset     Arrhythmia Mother      Hyperlipidemia Mother      Hypertension Father      Myocardial Infarction Father 81       SOCIAL HISTORY:  Social History     Socioeconomic History     Marital status:      Spouse name: None     Number of children: None     Years of education: None     Highest education level: None   Occupational History     None   Social Needs     Financial resource strain: None     Food insecurity:     Worry: None     Inability:  "None     Transportation needs:     Medical: None     Non-medical: None   Tobacco Use     Smoking status: Never Smoker     Smokeless tobacco: Never Used   Substance and Sexual Activity     Alcohol use: Yes     Comment: rare     Drug use: No     Sexual activity: None   Lifestyle     Physical activity:     Days per week: None     Minutes per session: None     Stress: None   Relationships     Social connections:     Talks on phone: None     Gets together: None     Attends Zoroastrian service: None     Active member of club or organization: None     Attends meetings of clubs or organizations: None     Relationship status: None     Intimate partner violence:     Fear of current or ex partner: None     Emotionally abused: None     Physically abused: None     Forced sexual activity: None   Other Topics Concern     Parent/sibling w/ CABG, MI or angioplasty before 65F 55M? No   Social History Narrative     None       Review of Systems:  Skin:  Negative       Eyes:  Positive for glasses    ENT:  Negative      Respiratory:  Negative       Cardiovascular:  Negative Positive for;chest pain;palpitations;dizziness CP- 1 month ago- 1 episode while working out while on the rowing machine--resolved within  minutes and went to ellipitical with no pain  Gastroenterology: Negative      Genitourinary:  not assessed      Musculoskeletal:  Negative      Neurologic:  Positive for migraine headaches(numbness in left leg into foot)    Psychiatric:  Negative      Heme/Lymph/Imm:  Positive for allergies    Endocrine:  Positive for   Has Reynaud's    Physical Exam:  Vitals: /71   Pulse 64   Ht 1.854 m (6' 1\")   Wt 88.9 kg (196 lb)   BMI 25.86 kg/m       Constitutional:  cooperative, alert and oriented, well developed, well nourished, in no acute distress        Skin:  warm and dry to the touch, no apparent skin lesions or masses noted          Head:  normocephalic, no masses or lesions        Eyes:  pupils equal and round, conjunctivae " and lids unremarkable, sclera white, no xanthalasma, EOMS intact, no nystagmus        Lymph:No Cervical lymphadenopathy present     ENT:  no pallor or cyanosis, dentition good        Neck:  carotid pulses are full and equal bilaterally, JVP normal, no carotid bruit        Respiratory:  normal breath sounds, clear to auscultation, normal A-P diameter, normal symmetry, normal respiratory excursion, no use of accessory muscles         Cardiac: regular rhythm, normal S1/S2, no S3 or S4, apical impulse not displaced, no murmurs, gallops or rubs   S4         sinus andreia  pulses full and equal, no bruits auscultated                                   right groin    GI:           Extremities and Muscular Skeletal:  no deformities, clubbing, cyanosis, erythema observed;no edema              Neurological:  no gross motor deficits        Psych:  Alert and Oriented x 3      Recent Lab Results:  LIPID RESULTS:  Lab Results   Component Value Date    CHOL 144 11/05/2019    HDL 59 11/05/2019    LDL 72 11/05/2019    TRIG 63 11/05/2019       LIVER ENZYME RESULTS:  Lab Results   Component Value Date    ALT 12 05/21/2019       CBC RESULTS:  Lab Results   Component Value Date    WBC 4.8 01/23/2019    RBC 4.95 01/23/2019    HGB 14.9 01/23/2019    HCT 43.6 01/23/2019    MCV 88 01/23/2019    MCH 30.1 01/23/2019    MCHC 34.2 01/23/2019    RDW 12.7 01/23/2019     01/23/2019       BMP RESULTS:  Lab Results   Component Value Date     05/21/2019    POTASSIUM 3.7 05/21/2019    CHLORIDE 105 05/21/2019    CO2 27 05/21/2019    ANIONGAP 13.7 05/21/2019    GLC 98 05/21/2019    BUN 18 05/21/2019    CR 1.23 05/21/2019    GFRESTIMATED 62 05/21/2019    GFRESTBLACK 74 05/21/2019    KELY 10.0 05/21/2019        A1C RESULTS:  No results found for: A1C    INR RESULTS:  Lab Results   Component Value Date    INR 1.04 01/23/2019           CC  Jeramy Carter MD  [address]  [place] [id number]                  Thank you for allowing me  to participate in the care of your patient.      Sincerely,     ENRIKE Crocker [facility]    cc:   Jeramy Carter MD  [address]  [place] [phone]

## 2019-11-06 ENCOUNTER — HEALTH MAINTENANCE LETTER (OUTPATIENT)
Age: 54
End: 2019-11-06

## 2019-11-19 ENCOUNTER — TELEPHONE (OUTPATIENT)
Dept: CARDIOLOGY | Facility: CLINIC | Age: 54
End: 2019-11-19

## 2019-11-21 NOTE — TELEPHONE ENCOUNTER
PA Initiation    Medication: bystolic 2.5mg -   Insurance Company: [employer] - Phone [phone] Fax [phone]  Pharmacy Filling the Rx: [id number] - [place] - [id number]  Filling Pharmacy Phone: [phone]  Filling Pharmacy Fax: [phone]  Start Date: 11/21/2019

## 2019-11-25 ENCOUNTER — MYC MEDICAL ADVICE (OUTPATIENT)
Dept: CARDIOLOGY | Facility: CLINIC | Age: 54
End: 2019-11-25

## 2019-11-25 NOTE — TELEPHONE ENCOUNTER
"PRIOR AUTHORIZATION DENIED    Medication: bystolic 2.5mg - DENIED    Denial Date: 11/22/2019    Denial Rational:     Patient must have a history of trial & failure to or have a contraindication and/or intolerance 2 of the formulary alternative(s).  Formulary Alternative(s):  metoprolol and one additional beta-blocker: atenolol, bisoprolol, carvedilol, labetalol, nadolol, or propranolol.        Appeal Information:     **Please advise if appeal is necessary and place a letter of medical necessity under the \"letters\" tab in patient's chart and route back to [facility] [[phone]]          "

## 2019-11-26 NOTE — TELEPHONE ENCOUNTER
Will work on Letter of Medical Necessity and route to [facility] PA Team once complete.      GREGORIO Childs 8:20 AM 11/26/2019

## 2019-12-04 NOTE — TELEPHONE ENCOUNTER
Medication Appeal Initiation    We have initiated an appeal for the requested medication:  Medication: bystolic 2.5mg- Denied- Appeal -   Appeal Start Date:  12/4/2019  Insurance Company: [employer] - Phone [phone] Fax [phone]  Comments:  Sent in appeal with letter

## 2019-12-04 NOTE — TELEPHONE ENCOUNTER
Letter of medical necessity for Bystolic drafted and reviewed with LON Truong.  Routed to [facility] PA team to submit letter of medical to pt's insurance.    GREGORIO Childs 2:40 PM 12/4/2019

## 2019-12-04 NOTE — TELEPHONE ENCOUNTER
Rcv'd call from [facility]. Appeal has been received, I advised it be marked as urgent, therefore outcome will be made within 72 hours  Case ID #: [id number]  #: [id number] Medical Appeal Line

## 2019-12-06 NOTE — TELEPHONE ENCOUNTER
MEDICATION APPEAL APPROVED    Medication: bystolic 2.5mg- Denied- Appeal - APPROVED  Authorization Effective Date: 11/4/2019  Authorization Expiration Date: 12/4/2022  Approved Dose/Quantity:   Reference #:     Insurance Company: [employer] - Phone [phone] Fax [phone]  Expected CoPay:       CoPay Card Available:      Foundation Assistance Needed:    Which Pharmacy is filling the prescription (Not needed for infusion/clinic administered): [id number] - [place] - [address] [address] [facility]

## 2019-12-09 ENCOUNTER — MYC MEDICAL ADVICE (OUTPATIENT)
Dept: CARDIOLOGY | Facility: CLINIC | Age: 54
End: 2019-12-09

## 2019-12-09 DIAGNOSIS — I25.10 CORONARY ARTERY DISEASE INVOLVING NATIVE CORONARY ARTERY OF NATIVE HEART WITHOUT ANGINA PECTORIS: ICD-10-CM

## 2019-12-10 RX ORDER — NEBIVOLOL 2.5 MG/1
TABLET ORAL
Qty: 90 TABLET | Refills: 3 | Status: SHIPPED | OUTPATIENT
Start: 2019-12-10 | End: 2019-12-10

## 2019-12-10 RX ORDER — NEBIVOLOL 2.5 MG/1
2.5 TABLET ORAL DAILY
Qty: 90 TABLET | Refills: 3 | Status: SHIPPED | OUTPATIENT
Start: 2019-12-10 | End: 2020-05-21

## 2020-05-15 ENCOUNTER — TELEPHONE (OUTPATIENT)
Dept: LAB | Facility: CLINIC | Age: 55
End: 2020-05-15

## 2020-05-15 NOTE — TELEPHONE ENCOUNTER
Wellness Screening Tool    Symptom Screening:    Do you have one of the following new symptoms:      Fever or reported chills?   No    A new cough (started within the past 14 days)?  No    Shortness of breath (started within the past 14 days)?No    Nausea, vomiting or diarrhea?  No    Within the past 3 weeks, have you been exposed to someone with a known positive illness below?      COVID - 19 (known or suspected)  no    Chicken pox?   no    Measles? no    Pertussis?  No          Patient notified of visitor restriction: Yes  Patient informed to wear a mask: YES     Patient's appointment status: Patient will be seen in clinic as scheduled on 05/18/20

## 2020-05-18 ENCOUNTER — HOSPITAL ENCOUNTER (OUTPATIENT)
Dept: CARDIOLOGY | Facility: CLINIC | Age: 55
Discharge: HOME | End: 2020-05-18
Attending: NURSE PRACTITIONER | Admitting: NURSE PRACTITIONER
Payer: COMMERCIAL

## 2020-05-18 DIAGNOSIS — I25.10 CORONARY ARTERY DISEASE INVOLVING NATIVE CORONARY ARTERY OF NATIVE HEART WITHOUT ANGINA PECTORIS: ICD-10-CM

## 2020-05-18 LAB
ALT SERPL W P-5'-P-CCNC: 7 U/L (ref 5–30)
ANION GAP SERPL CALCULATED.3IONS-SCNC: 11.1 MMOL/L (ref 6–17)
BUN SERPL-MCNC: 21 MG/DL (ref 7–30)
CALCIUM SERPL-MCNC: 9.9 MG/DL (ref 8.5–10.5)
CHLORIDE SERPL-SCNC: 104 MMOL/L (ref 98–107)
CHOLEST SERPL-MCNC: 140 MG/DL
CO2 SERPL-SCNC: 27 MMOL/L (ref 23–29)
CREAT SERPL-MCNC: 1.32 MG/DL (ref 0.7–1.3)
GFR SERPL CREATININE-BSD FRML MDRD: 57 ML/MIN/{1.73_M2}
GLUCOSE SERPL-MCNC: 98 MG/DL (ref 70–105)
HDLC SERPL-MCNC: 55 MG/DL
LDLC SERPL CALC-MCNC: 68 MG/DL
NONHDLC SERPL-MCNC: 85 MG/DL
POTASSIUM SERPL-SCNC: 4.1 MMOL/L (ref 3.5–5.1)
SODIUM SERPL-SCNC: 138 MMOL/L (ref 136–145)
TRIGL SERPL-MCNC: 84 MG/DL

## 2020-05-18 PROCEDURE — 84460 ALANINE AMINO (ALT) (SGPT): CPT | Performed by: INTERNAL MEDICINE

## 2020-05-18 PROCEDURE — 36415 COLL VENOUS BLD VENIPUNCTURE: CPT | Performed by: INTERNAL MEDICINE

## 2020-05-18 PROCEDURE — 80061 LIPID PANEL: CPT | Performed by: INTERNAL MEDICINE

## 2020-05-18 PROCEDURE — 93306 TTE W/DOPPLER COMPLETE: CPT

## 2020-05-18 PROCEDURE — 80048 BASIC METABOLIC PNL TOTAL CA: CPT | Performed by: INTERNAL MEDICINE

## 2020-05-18 PROCEDURE — 93306 TTE W/DOPPLER COMPLETE: CPT | Mod: 26 | Performed by: INTERNAL MEDICINE

## 2020-05-21 ENCOUNTER — VIRTUAL VISIT (OUTPATIENT)
Dept: CARDIOLOGY | Facility: CLINIC | Age: 55
End: 2020-05-21
Attending: NURSE PRACTITIONER
Payer: COMMERCIAL

## 2020-05-21 DIAGNOSIS — I25.10 CORONARY ARTERY DISEASE INVOLVING NATIVE CORONARY ARTERY OF NATIVE HEART WITHOUT ANGINA PECTORIS: ICD-10-CM

## 2020-05-21 DIAGNOSIS — I21.19 ST ELEVATION MYOCARDIAL INFARCTION (STEMI) INVOLVING OTHER CORONARY ARTERY OF INFERIOR WALL (H): ICD-10-CM

## 2020-05-21 DIAGNOSIS — I34.0 NONRHEUMATIC MITRAL VALVE REGURGITATION: Primary | ICD-10-CM

## 2020-05-21 DIAGNOSIS — E87.6 HYPOKALEMIA: ICD-10-CM

## 2020-05-21 PROCEDURE — 99213 OFFICE O/P EST LOW 20 MIN: CPT | Mod: GT | Performed by: INTERNAL MEDICINE

## 2020-05-21 RX ORDER — NEBIVOLOL 2.5 MG/1
2.5 TABLET ORAL DAILY
Qty: 90 TABLET | Refills: 3 | Status: SHIPPED | OUTPATIENT
Start: 2020-05-21 | End: 2020-11-13

## 2020-05-21 RX ORDER — NITROGLYCERIN 0.4 MG/1
TABLET SUBLINGUAL
Qty: 25 TABLET | Refills: 3 | Status: SHIPPED | OUTPATIENT
Start: 2020-05-21 | End: 2020-05-22

## 2020-05-21 RX ORDER — ROSUVASTATIN CALCIUM 20 MG/1
20 TABLET, COATED ORAL DAILY
Qty: 90 TABLET | Refills: 3 | Status: SHIPPED | OUTPATIENT
Start: 2020-05-21 | End: 2020-05-22

## 2020-05-21 RX ORDER — POTASSIUM CHLORIDE 750 MG/1
10 TABLET, EXTENDED RELEASE ORAL DAILY
Qty: 90 TABLET | Refills: 3 | Status: SHIPPED | OUTPATIENT
Start: 2020-05-21 | End: 2020-05-22

## 2020-05-21 NOTE — PROGRESS NOTES
Service Date: 05/21/2020      TELEPHONE VISIT DOXIMITY      OFFICE NOTE:  This is a telephone Doximity visit with Braulio Bunch from 8:00 a.m. to 8:13 a.m.  This patient has a history of coronary artery disease with a myocardial infarction and v fib cardiac arrest.  He had seen his doctor a couple years ago in 05/2018 and was told to exercise more because of his cholesterol, etc.  He started the Insanity program, and unfortunately he promptly had a heart attack and then a v fib cardiac arrest.  Heart cath was performed.  His obtuse marginal vessel was occluded, and it was stented with a good result.  There was mild jailing of an OM1, and the other lesions were approximately 30% or less.  There was a question raised on another test if he had another ulcerative lesion, and he went back for another heart catheterization with OCT, and nothing specifically was found.  He has had no problem since that time.  We reviewed his echocardiogram today.  It shows lateral wall hypokinesis consistent with his OM infarction.  His ejection fraction is normal.  What is new is he has now mild to mildly moderate mitral insufficiency, whereas previously it was trace.  No doubt this is from the lateral wall being hypokinetic, causing the mitral insufficiency.  He has no shortness of breath, no chest pain.  He does have PVCs.  He states they are less prominent on the Bystolic as opposed to the metoprolol that he was on.  His Raynaud is also much better on the Bystolic.  His lipid numbers are now completely at goal.  Creatinine is elevated to 1.3, which is a little bit higher than before.  He is not taking Advil, etc.  I talked about making sure he is hydrated well, avoiding Advil and Aleve, etc.  He does work out, so it is possible he has a slightly elevated creatinine from his exercise workout.  He is on a potassium supplement, but not on diuretics; this was noted previously, also.  He does not have a history of hypertension, so it is a  little less likely this is hyperaldosteronism, but he did have a parathyroidectomy.  His calcium level was normal on this recent blood work.  At this time he states he is feeling well.  I have not made any changes.  We will get an echocardiogram next year to watch the mitral valve insufficiency.  We will get electrolytes and lipids, including that creatinine test.      Today's visit was a 13 minute telephone visit.  We will see him back in 1 year.        cc:   Josué Vaughn MD   [facility]    [address]   [place] [address]         SUKHI MEDELLIN MD             D: 2020   T: 2020   MT: lisa      Name:     BRIEN KUHN   MRN:      [id number]        Account:      [id number]   :      1965           Service Date: 2020      Document: [id number]

## 2020-05-21 NOTE — LETTER
5/21/2020    Josué Vaughn MD  [facility] [address]  [facility] [address]    RE: Braulio Bunch       Dear Colleague,    I had the pleasure of seeing Braulio Bunch in the [facility].    OFFICE NOTE:  This is a telephone Doximity visit with Braulio Bunch from 8:00 a.m. to 8:13 a.m.  This patient has a history of coronary artery disease with a myocardial infarction and v fib cardiac arrest.  He had seen his doctor a couple years ago in 05/2018 and was told to exercise more because of his cholesterol, etc.  He started the Insanity program, and unfortunately he promptly had a heart attack and then a v fib cardiac arrest.  Heart cath was performed.  His obtuse marginal vessel was occluded, and it was stented with a good result.  There was mild jailing of an OM1, and the other lesions were approximately 30% or less.  There was a question raised on another test if he had another ulcerative lesion, and he went back for another heart catheterization with OCT, and nothing specifically was found.  He has had no problem since that time.  We reviewed his echocardiogram today.  It shows lateral wall hypokinesis consistent with his OM infarction.  His ejection fraction is normal.  What is new is he has now mild to mildly moderate mitral insufficiency, whereas previously it was trace.  No doubt this is from the lateral wall being hypokinetic, causing the mitral insufficiency.  He has no shortness of breath, no chest pain.  He does have PVCs.  He states they are less prominent on the Bystolic as opposed to the metoprolol that he was on.  His Raynaud is also much better on the Bystolic.  His lipid numbers are now completely at goal.  Creatinine is elevated to 1.3, which is a little bit higher than before.  He is not taking Advil, etc.  I talked about making sure he is hydrated well, avoiding Advil and Aleve, etc.  He does work out, so it is possible he has a slightly elevated  creatinine from his exercise workout.  He is on a potassium supplement, but not on diuretics; this was noted previously, also.  He does not have a history of hypertension, so it is a little less likely this is hyperaldosteronism, but he did have a parathyroidectomy.  His calcium level was normal on this recent blood work.  At this time he states he is feeling well.  I have not made any changes.  We will get an echocardiogram next year to watch the mitral valve insufficiency.  We will get electrolytes and lipids, including that creatinine test.      Today's visit was a 13 minute telephone visit.  We will see him back in 1 year.       Thank you for allowing me to participate in the care of your patient.    Sincerely,     Jeramy Carter MD     [facility]

## 2020-05-21 NOTE — PROGRESS NOTES
"Braulio Bunch is a 55 year old male who is being evaluated via a billable video visit.      The patient has been notified of following:     \"This video visit will be conducted via a call between you and your physician/provider. We have found that certain health care needs can be provided without the need for an in-person physical exam.  This service lets us provide the care you need with a video conversation.  If a prescription is necessary we can send it directly to your pharmacy.  If lab work is needed we can place an order for that and you can then stop by our lab to have the test done at a later time.    Video visits are billed at different rates depending on your insurance coverage.  Please reach out to your insurance provider with any questions.    If during the course of the call the physician/provider feels a video visit is not appropriate, you will not be charged for this service.\"    Patient has given verbal consent for Video visit? Yes    How would you like to obtain your AVS? Carlos     Patient would like the video invitation sent by: Send to e-mail at: [email or web address]    Will anyone else be joining your video visit? No      Review Of Systems  Skin: NEGATIVE  Eyes:Ears/Nose/Throat: NEGATIVE  Respiratory: NEGATIVE  Cardiovascular:lightheaded positional  Gastrointestinal: NEGATIVE  Genitourinary:NEGATIVE   Musculoskeletal: NEGATIVE  Neurologic: NEGATIVE  Psychiatric: NEGATIVE  Hematologic/Lymphatic/Immunologic: NEGATIVE  Endocrine:  NEGATIVE  Vital signs reported by patient  BP. 114/66  P. 52  Wt. 193  Ht. 6'1\"  [address]      Video-Visit Details  Telephone visit   [phone] am    Distant Location (provider location):  [facility]     Telephone cindy parsons      "

## 2020-05-22 DIAGNOSIS — E87.6 HYPOKALEMIA: ICD-10-CM

## 2020-05-22 DIAGNOSIS — I21.19 ST ELEVATION MYOCARDIAL INFARCTION (STEMI) INVOLVING OTHER CORONARY ARTERY OF INFERIOR WALL (H): ICD-10-CM

## 2020-05-22 RX ORDER — ROSUVASTATIN CALCIUM 20 MG/1
20 TABLET, COATED ORAL DAILY
Qty: 90 TABLET | Refills: 3 | Status: SHIPPED | OUTPATIENT
Start: 2020-05-22 | End: 2021-06-14

## 2020-05-22 RX ORDER — NITROGLYCERIN 0.4 MG/1
TABLET SUBLINGUAL
Qty: 25 TABLET | Refills: 3 | Status: SHIPPED | OUTPATIENT
Start: 2020-05-22 | End: 2021-06-14

## 2020-05-22 RX ORDER — POTASSIUM CHLORIDE 750 MG/1
10 TABLET, EXTENDED RELEASE ORAL DAILY
Qty: 90 TABLET | Refills: 3 | Status: SHIPPED | OUTPATIENT
Start: 2020-05-22 | End: 2021-06-14

## 2020-07-29 ENCOUNTER — MYC MEDICAL ADVICE (OUTPATIENT)
Dept: CARDIOLOGY | Facility: CLINIC | Age: 55
End: 2020-07-29

## 2020-07-29 DIAGNOSIS — I49.01 VENTRICULAR FIBRILLATION (H): Primary | ICD-10-CM

## 2020-07-30 ENCOUNTER — TELEPHONE (OUTPATIENT)
Dept: CARDIOLOGY | Facility: CLINIC | Age: 55
End: 2020-07-30

## 2020-07-30 ENCOUNTER — TELEPHONE (OUTPATIENT)
Dept: CARDIOLOGY | Facility: CLINIC | Age: 55
End: 2020-07-30
Payer: COMMERCIAL

## 2020-07-30 DIAGNOSIS — I49.01 VENTRICULAR FIBRILLATION (H): ICD-10-CM

## 2020-07-30 DIAGNOSIS — I25.10 CORONARY ARTERY DISEASE INVOLVING NATIVE CORONARY ARTERY OF NATIVE HEART WITHOUT ANGINA PECTORIS: Primary | ICD-10-CM

## 2020-07-30 NOTE — TELEPHONE ENCOUNTER

## 2020-07-30 NOTE — TELEPHONE ENCOUNTER
Pt /76, and EKG done reviewed with DR Whatley is unchanged.  Will recommend Pt keep OV with DR JAQUZE, and have asked him to monitor BP over the weekend, and take BP also if has weakness again.  Will cancel DR Britt video visit. RICHIE Mclaughlin RN

## 2020-07-30 NOTE — TELEPHONE ENCOUNTER
Pt did call in today. He said his heart has been flip flopping around in his chest. Pt said yesterday while standing in kitchen he felt flip flopping. Informed Pt will get him an apointment and told him if his symptoms return and do not go away quickly he needs to go to ER. Pt had previously been Afib, Vfib arrest after starting exercise program. Will have schedulig contact Pt and bring him in for EKG, and BP check if possible, other wise he will see DR JAQUEZ 8/3/20. RICHIE Mclaughlin RN

## 2020-07-31 ENCOUNTER — TELEPHONE (OUTPATIENT)
Dept: CARDIOLOGY | Facility: CLINIC | Age: 55
End: 2020-07-31

## 2020-07-31 NOTE — TELEPHONE ENCOUNTER

## 2020-08-03 ENCOUNTER — OFFICE VISIT (OUTPATIENT)
Dept: CARDIOLOGY | Facility: CLINIC | Age: 55
End: 2020-08-03
Payer: COMMERCIAL

## 2020-08-03 VITALS
DIASTOLIC BLOOD PRESSURE: 84 MMHG | WEIGHT: 197.3 LBS | HEART RATE: 52 BPM | SYSTOLIC BLOOD PRESSURE: 128 MMHG | BODY MASS INDEX: 26.03 KG/M2

## 2020-08-03 DIAGNOSIS — I49.3 PVC'S (PREMATURE VENTRICULAR CONTRACTIONS): ICD-10-CM

## 2020-08-03 DIAGNOSIS — Z82.49 FAMILY HISTORY OF PSVT (PAROXYSMAL SUPRAVENTRICULAR TACHYCARDIA): Primary | ICD-10-CM

## 2020-08-03 PROCEDURE — 99213 OFFICE O/P EST LOW 20 MIN: CPT | Performed by: INTERNAL MEDICINE

## 2020-08-03 NOTE — PROGRESS NOTES
HPI and Plan:   See dictation    Orders Placed This Encounter   Procedures     Holter Monitor 48 hour Adult Pediatric       No orders of the defined types were placed in this encounter.      Encounter Diagnoses   Name Primary?     Family history of PSVT (paroxysmal supraventricular tachycardia) Yes     PVC's (premature ventricular contractions)        CURRENT MEDICATIONS:  Current Outpatient Medications   Medication Sig Dispense Refill     aspirin (ASA) 81 MG EC tablet Take 1 tablet (81 mg) by mouth daily 90 tablet 3     nebivolol (BYSTOLIC) 2.5 MG tablet Take 1 tablet (2.5 mg) by mouth daily 90 tablet 3     potassium chloride ER (KLOR-CON M) 10 MEQ CR tablet Take 1 tablet (10 mEq) by mouth daily 90 tablet 3     rosuvastatin (CRESTOR) 20 MG tablet Take 1 tablet (20 mg) by mouth daily 90 tablet 3     VITAMIN D, CHOLECALCIFEROL, PO Take 1,000 Units by mouth daily        nitroGLYcerin (NITROSTAT) 0.4 MG sublingual tablet For chest pain place 1 tablet under the tongue every 5 minutes for 3 doses. If symptoms persist 5 minutes after 1st dose call 911. (Patient not taking: Reported on 8/3/2020) 25 tablet 3       ALLERGIES     Allergies   Allergen Reactions     Augmentin      Coreg [Carvedilol]      Brain fog     Metoprolol      fatigue       PAST MEDICAL HISTORY:  Past Medical History:   Diagnosis Date     Acute myocardial infarction (H) 5/13/2018     CAD (coronary artery disease)     Cath 5/10/18- PCI with revascularization to OM- EVELYN placed; jailing of OM-1--dilated lad 30%; mild disease elsewhere     Hyperlipidemia      Kidney calculi      Migraine      Mitral regurgitation      PVC (premature ventricular contraction)      Raynaud's disease      STEMI (ST elevation myocardial infarction) (H) 5/10/2018     Ventricular fibrillation (H)     5/10/18 in ER     Vitamin D deficiency        PAST SURGICAL HISTORY:  Past Surgical History:   Procedure Laterality Date     CV HEART CATHETERIZATION WITH POSSIBLE INTERVENTION N/A  1/23/2019    Procedure: Coronary Angiogram with OCT and IVUS to RCA for ulcerated plaque in CTA;  Surgeon: Jeramy Carter MD;  Location:  HEART CARDIAC CATH LAB     PARATHYROIDECTOMY         FAMILY HISTORY:  Family History   Problem Relation Age of Onset     Arrhythmia Mother      Hyperlipidemia Mother      Hypertension Father      Myocardial Infarction Father 81       SOCIAL HISTORY:  Social History     Socioeconomic History     Marital status:      Spouse name: None     Number of children: None     Years of education: None     Highest education level: None   Occupational History     None   Social Needs     Financial resource strain: None     Food insecurity     Worry: None     Inability: None     Transportation needs     Medical: None     Non-medical: None   Tobacco Use     Smoking status: Never Smoker     Smokeless tobacco: Never Used   Substance and Sexual Activity     Alcohol use: Yes     Comment: rare     Drug use: No     Sexual activity: None   Lifestyle     Physical activity     Days per week: None     Minutes per session: None     Stress: None   Relationships     Social connections     Talks on phone: None     Gets together: None     Attends Zoroastrianism service: None     Active member of club or organization: None     Attends meetings of clubs or organizations: None     Relationship status: None     Intimate partner violence     Fear of current or ex partner: None     Emotionally abused: None     Physically abused: None     Forced sexual activity: None   Other Topics Concern     Parent/sibling w/ CABG, MI or angioplasty before 65F 55M? No   Social History Narrative     None       Review of Systems:  Skin:  Negative     Eyes:  Positive for glasses  ENT:  Negative    Respiratory:  Negative    Cardiovascular:  Negative Positive for;chest pain;palpitations  Gastroenterology: Negative    Genitourinary:  not assessed    Musculoskeletal:  Negative    Neurologic:  Positive for migraine headaches(numbness  in left leg into foot)  Psychiatric:  Negative    Heme/Lymph/Imm:  Positive for allergies  Endocrine:  Positive for      Physical Exam:  Vitals: /84   Pulse 52   Wt 89.5 kg (197 lb 4.8 oz)   BMI 26.03 kg/m      Constitutional:  cooperative, alert and oriented, well developed, well nourished, in no acute distress        Skin:  warm and dry to the touch, no apparent skin lesions or masses noted          Head:  normocephalic, no masses or lesions        Eyes:  pupils equal and round, conjunctivae and lids unremarkable, sclera white, no xanthalasma, EOMS intact, no nystagmus        Lymph:No Cervical lymphadenopathy present     ENT:  no pallor or cyanosis, dentition good        Neck:  carotid pulses are full and equal bilaterally, JVP normal, no carotid bruit        Respiratory:  normal breath sounds, clear to auscultation, normal A-P diameter, normal symmetry, normal respiratory excursion, no use of accessory muscles         Cardiac: regular rhythm, normal S1/S2, no S3 or S4, apical impulse not displaced, no murmurs, gallops or rubs   S4         sinus andreia  pulses full and equal, no bruits auscultated                                   right groin    GI:           Extremities and Muscular Skeletal:  no deformities, clubbing, cyanosis, erythema observed;no edema              Neurological:  no gross motor deficits        Psych:  Alert and Oriented x 3        Recent Lab Results:  LIPID RESULTS:  Lab Results   Component Value Date    CHOL 140 05/18/2020    HDL 55 05/18/2020    LDL 68 05/18/2020    TRIG 84 05/18/2020       LIVER ENZYME RESULTS:  Lab Results   Component Value Date    ALT 7 05/18/2020       CBC RESULTS:  Lab Results   Component Value Date    WBC 4.8 01/23/2019    RBC 4.95 01/23/2019    HGB 14.9 01/23/2019    HCT 43.6 01/23/2019    MCV 88 01/23/2019    MCH 30.1 01/23/2019    MCHC 34.2 01/23/2019    RDW 12.7 01/23/2019     01/23/2019       BMP RESULTS:  Lab Results   Component Value Date    NA  138 05/18/2020    POTASSIUM 4.1 05/18/2020    CHLORIDE 104 05/18/2020    CO2 27 05/18/2020    ANIONGAP 11.1 05/18/2020    GLC 98 05/18/2020    BUN 21 05/18/2020    CR 1.32 (H) 05/18/2020    GFRESTIMATED 57 (L) 05/18/2020    GFRESTBLACK 68 05/18/2020    KELY 9.9 05/18/2020        A1C RESULTS:  No results found for: A1C    INR RESULTS:  Lab Results   Component Value Date    INR 1.04 01/23/2019           CC  No referring provider defined for this encounter.

## 2020-08-03 NOTE — LETTER
8/3/2020    Josué Vaughn MD  [facility] [address]  [facility] [address]    RE: Braulio Bunch       Dear Colleague,    I had the pleasure of seeing Braulio Bunch in the [facility].    HPI and Plan:   See dictation    Orders Placed This Encounter   Procedures     Holter Monitor 48 hour Adult Pediatric       No orders of the defined types were placed in this encounter.      Encounter Diagnoses   Name Primary?     Family history of PSVT (paroxysmal supraventricular tachycardia) Yes     PVC's (premature ventricular contractions)        CURRENT MEDICATIONS:  Current Outpatient Medications   Medication Sig Dispense Refill     aspirin (ASA) 81 MG EC tablet Take 1 tablet (81 mg) by mouth daily 90 tablet 3     nebivolol (BYSTOLIC) 2.5 MG tablet Take 1 tablet (2.5 mg) by mouth daily 90 tablet 3     potassium chloride ER (KLOR-CON M) 10 MEQ CR tablet Take 1 tablet (10 mEq) by mouth daily 90 tablet 3     rosuvastatin (CRESTOR) 20 MG tablet Take 1 tablet (20 mg) by mouth daily 90 tablet 3     VITAMIN D, CHOLECALCIFEROL, PO Take 1,000 Units by mouth daily        nitroGLYcerin (NITROSTAT) 0.4 MG sublingual tablet For chest pain place 1 tablet under the tongue every 5 minutes for 3 doses. If symptoms persist 5 minutes after 1st dose call 911. (Patient not taking: Reported on 8/3/2020) 25 tablet 3       ALLERGIES     Allergies   Allergen Reactions     Augmentin      Coreg [Carvedilol]      Brain fog     Metoprolol      fatigue       PAST MEDICAL HISTORY:  Past Medical History:   Diagnosis Date     Acute myocardial infarction (H) 5/13/2018     CAD (coronary artery disease)     Cath 5/10/18- PCI with revascularization to OM- EVELYN placed; jailing of OM-1--dilated lad 30%; mild disease elsewhere     Hyperlipidemia      Kidney calculi      Migraine      Mitral regurgitation      PVC (premature ventricular contraction)      Raynaud's disease      STEMI (ST elevation myocardial  infarction) (H) 5/10/2018     Ventricular fibrillation (H)     5/10/18 in ER     Vitamin D deficiency        PAST SURGICAL HISTORY:  Past Surgical History:   Procedure Laterality Date     CV HEART CATHETERIZATION WITH POSSIBLE INTERVENTION N/A 1/23/2019    Procedure: Coronary Angiogram with OCT and IVUS to RCA for ulcerated plaque in CTA;  Surgeon: Jeramy Carter MD;  Location:  HEART CARDIAC CATH LAB     PARATHYROIDECTOMY         FAMILY HISTORY:  Family History   Problem Relation Age of Onset     Arrhythmia Mother      Hyperlipidemia Mother      Hypertension Father      Myocardial Infarction Father 81       SOCIAL HISTORY:  Social History     Socioeconomic History     Marital status:      Spouse name: None     Number of children: None     Years of education: None     Highest education level: None   Occupational History     None   Social Needs     Financial resource strain: None     Food insecurity     Worry: None     Inability: None     Transportation needs     Medical: None     Non-medical: None   Tobacco Use     Smoking status: Never Smoker     Smokeless tobacco: Never Used   Substance and Sexual Activity     Alcohol use: Yes     Comment: rare     Drug use: No     Sexual activity: None   Lifestyle     Physical activity     Days per week: None     Minutes per session: None     Stress: None   Relationships     Social connections     Talks on phone: None     Gets together: None     Attends Confucianist service: None     Active member of club or organization: None     Attends meetings of clubs or organizations: None     Relationship status: None     Intimate partner violence     Fear of current or ex partner: None     Emotionally abused: None     Physically abused: None     Forced sexual activity: None   Other Topics Concern     Parent/sibling w/ CABG, MI or angioplasty before 65F 55M? No   Social History Narrative     None       Review of Systems:  Skin:  Negative     Eyes:  Positive for glasses  ENT:   Negative    Respiratory:  Negative    Cardiovascular:  Negative Positive for;chest pain;palpitations  Gastroenterology: Negative    Genitourinary:  not assessed    Musculoskeletal:  Negative    Neurologic:  Positive for migraine headaches(numbness in left leg into foot)  Psychiatric:  Negative    Heme/Lymph/Imm:  Positive for allergies  Endocrine:  Positive for      Physical Exam:  Vitals: /84   Pulse 52   Wt 89.5 kg (197 lb 4.8 oz)   BMI 26.03 kg/m      Constitutional:  cooperative, alert and oriented, well developed, well nourished, in no acute distress        Skin:  warm and dry to the touch, no apparent skin lesions or masses noted          Head:  normocephalic, no masses or lesions        Eyes:  pupils equal and round, conjunctivae and lids unremarkable, sclera white, no xanthalasma, EOMS intact, no nystagmus        Lymph:No Cervical lymphadenopathy present     ENT:  no pallor or cyanosis, dentition good        Neck:  carotid pulses are full and equal bilaterally, JVP normal, no carotid bruit        Respiratory:  normal breath sounds, clear to auscultation, normal A-P diameter, normal symmetry, normal respiratory excursion, no use of accessory muscles         Cardiac: regular rhythm, normal S1/S2, no S3 or S4, apical impulse not displaced, no murmurs, gallops or rubs   S4         sinus andreia  pulses full and equal, no bruits auscultated                                   right groin    GI:           Extremities and Muscular Skeletal:  no deformities, clubbing, cyanosis, erythema observed;no edema              Neurological:  no gross motor deficits        Psych:  Alert and Oriented x 3        Recent Lab Results:  LIPID RESULTS:  Lab Results   Component Value Date    CHOL 140 05/18/2020    HDL 55 05/18/2020    LDL 68 05/18/2020    TRIG 84 05/18/2020       LIVER ENZYME RESULTS:  Lab Results   Component Value Date    ALT 7 05/18/2020       CBC RESULTS:  Lab Results   Component Value Date    WBC 4.8  01/23/2019    RBC 4.95 01/23/2019    HGB 14.9 01/23/2019    HCT 43.6 01/23/2019    MCV 88 01/23/2019    MCH 30.1 01/23/2019    MCHC 34.2 01/23/2019    RDW 12.7 01/23/2019     01/23/2019       BMP RESULTS:  Lab Results   Component Value Date     05/18/2020    POTASSIUM 4.1 05/18/2020    CHLORIDE 104 05/18/2020    CO2 27 05/18/2020    ANIONGAP 11.1 05/18/2020    GLC 98 05/18/2020    BUN 21 05/18/2020    CR 1.32 (H) 05/18/2020    GFRESTIMATED 57 (L) 05/18/2020    GFRESTBLACK 68 05/18/2020    KELY 9.9 05/18/2020        A1C RESULTS:  No results found for: A1C    INR RESULTS:  Lab Results   Component Value Date    INR 1.04 01/23/2019           CC  No referring provider defined for this encounter.                  Thank you for allowing me to participate in the care of your patient.      Sincerely,     DR SAVANAH JAQUEZ MD     [facility]    cc:   No referring provider defined for this encounter.

## 2020-08-03 NOTE — LETTER
8/3/2020      Josué Vaughn MD  [facility] [address]  [facility] [address]      RE: Braulio Xiaoin       Dear Colleague,    I had the pleasure of seeing Braulio Bunch in the [facility].    Service Date: 08/03/2020      HISTORY OF PRESENT ILLNESS:  It is a pleasure for me to see Mr. Bunch today.  He is here because of palpitations and near syncope.      I read from Dr. Carter's excellent note that this is a gentleman who presented with v fib arrest in 2018.  He had a lateral wall myocardial infarction, and the culprit was thought to be the OM1, which was revascularized with the insertion of an excellent angiographic result.  There was nonobstructive disease elsewhere, and left ventricular systolic function was preserved.      Most recent visit was in 05/2020 with Dr. Carter.  His mitral regurgitation had increased from mild to mild to moderate.      This gentleman tells me that he has always had PVCs felt as skipped beats.  When he was undergoing cardiac rehab, he was told he was in bigeminy.  He had a Zio patch monitor in 2018, which was reported as showing rare PACs and PVCs with intermittent accelerated ventricular rhythm.      Over the past few months, he has noticed more PVCs.  He has no other cardiac symptoms.  He thought it may be due to his caffeine and alcohol intake.  He tells me he drinks a cup of coffee every other day, and he drinks an alcoholic drink once a week.  I reassured him that at this level of consumption, these substances would make no difference to his symptomatology.  Several days ago he felt multiple PVCs, and when he stood up, he had near syncope.  He tells me that he has had episodes of orthostasis, particularly if he crosses his legs when he is sitting.  These episodes only last for a few seconds.  I checked his orthostatics today.  His resting blood pressure was 122/70.  At 2 minutes of standing, his blood pressure was 120/70, and  at 5 minutes, it was 116/68, so he had no orthostasis, and he was asymptomatic.  He is just on an aspirin, Bystolic and rosuvastatin for his heart.      I had a nice conversation with him.  He admits to being quite stressed at this time.  He is worried about the COVID pandemic.  He is of the opinion that he is at high risk because of his underlying heart disease, he is very concerned with his MR getting worse, I managed to reassure him about this.  He is also stressed out about the fact that he is not sure what his 17-year-old son is going to do at school in the fall.  His wife is having a breast biopsy today, and he is concerned that it may be cancerous.  Suffice it to say that his stress levels are significantly increased recently, and this could certainly be a cause of increased PVC frequency.      He had labs back in May of this year, and his electrolytes are normal.  He is known to have stage 2 chronic renal failure.      I will try and quantify his PVCs by doing a 48 hour Holter.  I should also add that he is worried about the fact that his mitral regurgitation increased.  I reassured him that we will continue to follow this, and I would certainly agree with Dr. Carter's assessment that I do not think he would need mitral valve intervention in the near future.      It has been my pleasure to be involved in this gentleman's care.  We will keep you abreast of his Holter monitor results.            SAVANAH JAQUEZ MD, [facility]             D: 2020   T: 2020   MT: lisa      Name:     BRIEN KUHN   MRN:      [id number]        Account:      [id number]   :      1965           Service Date: 2020      Document: [id number]           Outpatient Encounter Medications as of 8/3/2020   Medication Sig Dispense Refill     aspirin (ASA) 81 MG EC tablet Take 1 tablet (81 mg) by mouth daily 90 tablet 3     nebivolol (BYSTOLIC) 2.5 MG tablet Take 1 tablet (2.5 mg) by mouth daily 90 tablet 3     potassium  chloride ER (KLOR-CON M) 10 MEQ CR tablet Take 1 tablet (10 mEq) by mouth daily 90 tablet 3     rosuvastatin (CRESTOR) 20 MG tablet Take 1 tablet (20 mg) by mouth daily 90 tablet 3     VITAMIN D, CHOLECALCIFEROL, PO Take 1,000 Units by mouth daily        nitroGLYcerin (NITROSTAT) 0.4 MG sublingual tablet For chest pain place 1 tablet under the tongue every 5 minutes for 3 doses. If symptoms persist 5 minutes after 1st dose call 911. (Patient not taking: Reported on 8/3/2020) 25 tablet 3     [DISCONTINUED] HYDROcodone-acetaminophen (NORCO) 5-325 MG tablet Take 1 tablet by mouth as needed for severe pain       No facility-administered encounter medications on file as of 8/3/2020.        Again, thank you for allowing me to participate in the care of your patient.      Sincerely,    DR SAVANAH JAQEUZ MD     [facility]

## 2020-08-04 ENCOUNTER — TELEPHONE (OUTPATIENT)
Dept: CARDIOLOGY | Facility: CLINIC | Age: 55
End: 2020-08-04

## 2020-08-04 NOTE — TELEPHONE ENCOUNTER
EKG tech called to inform us that she did the Wellness Screen and patient responded that he was exposed to someone with COVID. I told tech that there was no urgency in doing the holter that is scheduled for tomorrow. It was ordered by Dr. Brown for palpitations (PVC's /Fa Hx SVT).  I called patient and he told me that he was exposed on 7/29 by his next door neighbor who is 81 yo with diabetes. His neighbor subsequently ended up at [place].  Patient is working with [facility] to get COVID tested. He is experiencing now some seasonal allergies in the form of a dry throat and is going to say that he is symptomatic with a sore throat so he can get tested.   We agreed to cancel tomorrow's holter appointment and patient will call us as soon as he gets his COVID results. We can then get his holter rescheduled accordingly. I had Itzel in scheduling cancel the holter appointment.

## 2020-08-04 NOTE — TELEPHONE ENCOUNTER
PATIENT WELLNESS TELEPHONE SCREENING     Step 1 Screening Questions    In the past 3 weeks, have you been exposed to someone with a suspected or known illness?  COVID-19? Yes   Chickenpox? No   Measles? No  Pertussis? No    In the past 2 weeks, have you had any of the following symptoms?   Fever/Chills? No   Cough? No   Shortness of breath? No   New loss of taste or smell? No  Sore throat? No  Muscle or body aches? No  Headaches? Yes   Fatigue? No  Vomiting or diarrhea? No    Step 2 Screening Results (Skip if the patient is negative for symptoms)    If the patient is positive for new or worsening symptoms, contact the ordering provider to determine if the procedure is deemed necessary. Determine if patient can be re-scheduled when the patient is symptom free or has a negative COVID test.     If ordering provider deems the procedure is necessary, notify your manager/supervisor. Provide the patient with the procedural department phone number and inform the patient to call the procedural department upon arrival.  The patient will be registered over the phone.    Step 3 Review Visitor Policy  Patient informed of the updated visitor policy   1 visitor allowed per patient   Visitor must screen negative for COVID symptoms   Visitor must wear a mask    Angelica Rendon

## 2020-08-05 NOTE — PROGRESS NOTES
Service Date: 08/03/2020      HISTORY OF PRESENT ILLNESS:  It is a pleasure for me to see Mr. Bunch today.  He is here because of palpitations and near syncope.      I read from Dr. Carter's excellent note that this is a gentleman who presented with v fib arrest in 2018.  He had a lateral wall myocardial infarction, and the culprit was thought to be the OM1, which was revascularized with the insertion of an excellent angiographic result.  There was nonobstructive disease elsewhere, and left ventricular systolic function was preserved.      Most recent visit was in 05/2020 with Dr. Carter.  His mitral regurgitation had increased from mild to mild to moderate.      This gentleman tells me that he has always had PVCs felt as skipped beats.  When he was undergoing cardiac rehab, he was told he was in bigemin.  He had a Zio patch monitor in 2018, which was reported as showing rare PACs and PVCs with intermittent accelerated ventricular rhythm.      Over the past few months, he has noticed more PVCs.  He has no other cardiac symptoms.  He thought it may be due to his caffeine and alcohol intake.  He tells me he drinks a cup of coffee every other day, and he drinks an alcoholic drink once a week.  I reassured him that at this level of consumption, these substances would make no difference to his symptomatology.  Several days ago he felt multiple PVCs, and when he stood up, he had near syncope.  He tells me that he has had episodes of orthostasis, particularly if he crosses his legs when he is sitting.  These episodes only last for a few seconds.  I checked his orthostatics today.  His resting blood pressure was 122/70.  At 2 minutes of standing, his blood pressure was 120/70, and at 5 minutes, it was 116/68, so he had no orthostasis, and he was asymptomatic.  He is just on an aspirin, Bystolic and rosuvastatin for his heart.      I had a nice conversation with him.  He admits to being quite stressed at this time.  He  is worried about the COVID pandemic.  He is of the opinion that he is at high risk because of his underlying heart disease, he is very concerned with his MR getting worse, I managed to reassure him about this.  He is also stressed out about the fact that he is not sure what his 17-year-old son is going to do at school in the fall.  His wife is having a breast biopsy today, and he is concerned that it may be cancerous.  Suffice it to say that his stress levels are significantly increased recently, and this could certainly be a cause of increased PVC frequency.      He had labs back in May of this year, and his electrolytes are normal.  He is known to have stage 2 chronic renal failure.      I will try and quantify his PVCs by doing a 48 hour Holter.  I should also add that he is worried about the fact that his mitral regurgitation increased.  I reassured him that we will continue to follow this, and I would certainly agree with Dr. Carter's assessment that I do not think he would need mitral valve intervention in the near future.      It has been my pleasure to be involved in this gentleman's care.  We will keep you abreast of his Holter monitor results.            SAVANAH JAQUEZ MD, [facility]             D: 2020   T: 2020   MT: lisa      Name:     BRIEN KUHN   MRN:      [id number]        Account:      [id number]   :      1965           Service Date: 2020      Document: [id number]

## 2020-08-11 ENCOUNTER — TELEPHONE (OUTPATIENT)
Dept: CARDIOLOGY | Facility: CLINIC | Age: 55
End: 2020-08-11

## 2020-08-11 NOTE — TELEPHONE ENCOUNTER
Voice mail message patient was exposed to his neighbor who was Positive for Covid-19 and patient had teste negative and his 2 weeks quarantine is up tomorrow 8-.  Patient called to reschedule holter hook up.  Will forward to scheduling to call and reschedule patient.

## 2020-08-14 ENCOUNTER — HOSPITAL ENCOUNTER (OUTPATIENT)
Dept: CARDIOLOGY | Facility: CLINIC | Age: 55
Discharge: HOME | End: 2020-08-14
Attending: INTERNAL MEDICINE | Admitting: INTERNAL MEDICINE
Payer: COMMERCIAL

## 2020-08-14 DIAGNOSIS — I49.3 PVC'S (PREMATURE VENTRICULAR CONTRACTIONS): ICD-10-CM

## 2020-08-14 DIAGNOSIS — Z82.49 FAMILY HISTORY OF PSVT (PAROXYSMAL SUPRAVENTRICULAR TACHYCARDIA): ICD-10-CM

## 2020-08-14 PROCEDURE — 93225 XTRNL ECG REC<48 HRS REC: CPT

## 2020-08-14 PROCEDURE — 93227 XTRNL ECG REC<48 HR R&I: CPT | Performed by: INTERNAL MEDICINE

## 2020-08-19 ENCOUNTER — TELEPHONE (OUTPATIENT)
Dept: CARDIOLOGY | Facility: CLINIC | Age: 55
End: 2020-08-19

## 2020-08-19 NOTE — TELEPHONE ENCOUNTER
----- Message from Ankush Brown MD sent at 8/19/2020  7:23 AM CDT -----  Pls let pt holter is normal.  Dr Carter's team will follow up from here on.  Thanks.

## 2020-08-19 NOTE — TELEPHONE ENCOUNTER
Patient returned call and I spent some time reviewing the holter that shows both PAC's and PVC's. He feels the PVC's as hard beats.I told him that with normal heart function these are benign. He is going through a lot of stress with his spouse starting breast ca treatment soon. In addition, he has not been sleeping well. These are all things, I told him that can cause some inflammation that will trigger premature beats. After he spoke with Dr. Brown the other day, he pause to reflect on gratitude and told himself that these premature are not harmful-Shortly after, he noted that the PVC's cleared.  I told him that he has good insight about this and to continue to practice self care measures. I will mail him a copy of the holter to him. All questions and concerns addressed to his satisfaction.

## 2020-11-13 DIAGNOSIS — I25.10 CORONARY ARTERY DISEASE INVOLVING NATIVE CORONARY ARTERY OF NATIVE HEART WITHOUT ANGINA PECTORIS: ICD-10-CM

## 2020-11-13 RX ORDER — NEBIVOLOL 2.5 MG/1
2.5 TABLET ORAL DAILY
Qty: 90 TABLET | Refills: 1 | Status: SHIPPED | OUTPATIENT
Start: 2020-11-13 | End: 2021-11-30

## 2020-11-29 ENCOUNTER — HEALTH MAINTENANCE LETTER (OUTPATIENT)
Age: 55
End: 2020-11-29

## 2021-05-07 ENCOUNTER — HOSPITAL ENCOUNTER (OUTPATIENT)
Dept: CARDIOLOGY | Facility: CLINIC | Age: 56
Discharge: HOME | End: 2021-05-07
Attending: INTERNAL MEDICINE | Admitting: INTERNAL MEDICINE
Payer: COMMERCIAL

## 2021-05-07 DIAGNOSIS — E87.6 HYPOKALEMIA: ICD-10-CM

## 2021-05-07 DIAGNOSIS — I34.0 NONRHEUMATIC MITRAL VALVE REGURGITATION: ICD-10-CM

## 2021-05-07 DIAGNOSIS — I21.19 ST ELEVATION MYOCARDIAL INFARCTION (STEMI) INVOLVING OTHER CORONARY ARTERY OF INFERIOR WALL (H): ICD-10-CM

## 2021-05-07 DIAGNOSIS — I25.10 CORONARY ARTERY DISEASE INVOLVING NATIVE CORONARY ARTERY OF NATIVE HEART WITHOUT ANGINA PECTORIS: ICD-10-CM

## 2021-05-07 LAB
ANION GAP SERPL CALCULATED.3IONS-SCNC: 1 MMOL/L (ref 3–14)
BUN SERPL-MCNC: 17 MG/DL (ref 7–30)
CALCIUM SERPL-MCNC: 9.2 MG/DL (ref 8.5–10.1)
CHLORIDE SERPL-SCNC: 108 MMOL/L (ref 94–109)
CHOLEST SERPL-MCNC: 147 MG/DL
CO2 SERPL-SCNC: 32 MMOL/L (ref 20–32)
CREAT SERPL-MCNC: 1.11 MG/DL (ref 0.66–1.25)
GFR SERPL CREATININE-BSD FRML MDRD: 74 ML/MIN/{1.73_M2}
GLUCOSE SERPL-MCNC: 94 MG/DL (ref 70–99)
HDLC SERPL-MCNC: 56 MG/DL
LDLC SERPL CALC-MCNC: 79 MG/DL
NONHDLC SERPL-MCNC: 91 MG/DL
POTASSIUM SERPL-SCNC: 3.7 MMOL/L (ref 3.4–5.3)
SODIUM SERPL-SCNC: 141 MMOL/L (ref 133–144)
TRIGL SERPL-MCNC: 60 MG/DL

## 2021-05-07 PROCEDURE — 93306 TTE W/DOPPLER COMPLETE: CPT

## 2021-05-07 PROCEDURE — 80061 LIPID PANEL: CPT | Performed by: INTERNAL MEDICINE

## 2021-05-07 PROCEDURE — 80048 BASIC METABOLIC PNL TOTAL CA: CPT | Performed by: INTERNAL MEDICINE

## 2021-05-07 PROCEDURE — 93306 TTE W/DOPPLER COMPLETE: CPT | Mod: 26 | Performed by: INTERNAL MEDICINE

## 2021-05-07 PROCEDURE — 36415 COLL VENOUS BLD VENIPUNCTURE: CPT | Performed by: INTERNAL MEDICINE

## 2021-05-10 ENCOUNTER — OFFICE VISIT (OUTPATIENT)
Dept: CARDIOLOGY | Facility: CLINIC | Age: 56
End: 2021-05-10
Attending: INTERNAL MEDICINE
Payer: COMMERCIAL

## 2021-05-10 VITALS
SYSTOLIC BLOOD PRESSURE: 109 MMHG | BODY MASS INDEX: 25.98 KG/M2 | WEIGHT: 196 LBS | HEART RATE: 64 BPM | HEIGHT: 73 IN | DIASTOLIC BLOOD PRESSURE: 70 MMHG

## 2021-05-10 DIAGNOSIS — E87.6 HYPOKALEMIA: ICD-10-CM

## 2021-05-10 DIAGNOSIS — I34.0 NONRHEUMATIC MITRAL VALVE REGURGITATION: ICD-10-CM

## 2021-05-10 DIAGNOSIS — I21.19 ST ELEVATION MYOCARDIAL INFARCTION (STEMI) INVOLVING OTHER CORONARY ARTERY OF INFERIOR WALL (H): ICD-10-CM

## 2021-05-10 DIAGNOSIS — I25.10 CORONARY ARTERY DISEASE INVOLVING NATIVE CORONARY ARTERY OF NATIVE HEART WITHOUT ANGINA PECTORIS: ICD-10-CM

## 2021-05-10 DIAGNOSIS — E78.5 HYPERLIPIDEMIA LDL GOAL <70: Primary | ICD-10-CM

## 2021-05-10 PROCEDURE — 99213 OFFICE O/P EST LOW 20 MIN: CPT | Performed by: INTERNAL MEDICINE

## 2021-05-10 ASSESSMENT — MIFFLIN-ST. JEOR: SCORE: 1777.93

## 2021-05-10 NOTE — PROGRESS NOTES
Service Date: 05/10/2021    Braulio Bunch returns for followup.  He is a delightful fit 56-year-old gentleman.  Three years ago essentially to the day he presented to our hospital with a lateral myocardial infarction complicated by V-fib cardiac arrest.  We stented the left circumflex with good result.  There was only mild disease elsewhere in the LAD and the right coronary artery.  There was a question of additional disease and so he had a repeat heart catheterization in 2019 which showed everything was stable with only mild disease.  We reviewed his echocardiogram today.  His lateral wall is hypokinetic but not akinetic.  Ejection fraction is normal.  There is no valvular heart disease to speak of, only mild mitral valve insufficiency.  His lipid numbers are reasonable, although his LDL started to creep up.  This is again classic from COVID.  He was not exercising as much, although his weight really is very similar to what it was last time we checked.  He was noting some lightheadedness when he stands up, and indeed when I checked his blood pressure initially when he came in to the office was 129 sitting. After about 15 minutes, I had him stand up and it dropped to 109 systolic.  I had him sit down, and then on repeat, it was 119.  At home, he is actually getting numbers even lower than that. He is getting blood pressures averaging about 95 systolic.  I reviewed with him the JEREL trial about beta blocker use.  I told him it does lower the death rate and lowers the reinfarction rate, but that was in an era before statins and before full revascularization, so I do not have strong feelings about what to do next.  This is the lowest dose beta blocker that he is currently on so the option would be to either be to cut the pill in half or go off completely and I do not have strong feelings one way or the other.  I did mention to him the concept of remodeling where, because the lateral wall is not working as well, the  back wall and front wall are doing more work and beta blockers may help prevent the wear-off phenomena, though the ejection fraction is normal. I do not want to be causing a side effect on a preventive medicine.  Of note, his Raynaud was actually better on the Bystolic and he also mentions his erectile dysfunction was better on the Bystolic and I explained to him the concept of nitric oxide.  I left it to him what to do next.  His LDL is a little high.  Rather than increase the Crestor, I am going to have him come back in 6 months.  We will recheck the lipids before the holidays and determine if he needs to go to a higher dose Crestor.  I do not anticipate another stress test probably until at least  since we did an angiogram in 2019.    Today's visit was 30 minutes.  We discussed his cholesterol.  I reviewed his angiogram pictures with him.  We reviewed the echocardiogram.  We talked about the JEREL trial, beta blockers, erectile dysfunction and Raynaud..    cc:  Josué Vaughn PA-C  [facility]  [address], [address]  [place]  [address]        Jeramy Carter MD        D: 05/10/2021   T: 05/10/2021   MT: [email or web address]    Name:     BRIEN KUHN  MRN:      [id number]        Account:      [id number]   :      1965           Service Date: 05/10/2021       Document: [id number]

## 2021-05-10 NOTE — LETTER
5/10/2021    [facility] [address]  [facility] [address]    RE: Braulio Bunch       Dear Colleague,    I had the pleasure of seeing Braulio Bunch in the [facility].    HPI and Plan:   See dictation    Orders Placed This Encounter   Procedures     Lipid Profile     Follow-Up with Cardiac Advanced Practice Provider     No orders of the defined types were placed in this encounter.    There are no discontinued medications.      Encounter Diagnoses   Name Primary?     ST elevation myocardial infarction (STEMI) involving other coronary artery of inferior wall (H)      Coronary artery disease involving native coronary artery of native heart without angina pectoris      Hypokalemia      Nonrheumatic mitral valve regurgitation      Hyperlipidemia LDL goal <70 Yes       CURRENT MEDICATIONS:  Current Outpatient Medications   Medication Sig Dispense Refill     aspirin (ASA) 81 MG EC tablet Take 1 tablet (81 mg) by mouth daily 90 tablet 3     nebivolol (BYSTOLIC) 2.5 MG tablet Take 1 tablet (2.5 mg) by mouth daily 90 tablet 1     potassium chloride ER (KLOR-CON M) 10 MEQ CR tablet Take 1 tablet (10 mEq) by mouth daily 90 tablet 3     rosuvastatin (CRESTOR) 20 MG tablet Take 1 tablet (20 mg) by mouth daily 90 tablet 3     VITAMIN D, CHOLECALCIFEROL, PO Take 1,000 Units by mouth 2 times daily        nitroGLYcerin (NITROSTAT) 0.4 MG sublingual tablet For chest pain place 1 tablet under the tongue every 5 minutes for 3 doses. If symptoms persist 5 minutes after 1st dose call 911. (Patient not taking: Reported on 8/3/2020) 25 tablet 3       ALLERGIES     Allergies   Allergen Reactions     Augmentin      Coreg [Carvedilol]      Brain fog     Metoprolol      fatigue       PAST MEDICAL HISTORY:  Past Medical History:   Diagnosis Date     Acute myocardial infarction (H) 5/13/2018     CAD (coronary artery disease)     Cath 5/10/18- PCI with  revascularization to OM- EVELYN placed; jailing of OM-1--dilated lad 30%; mild disease elsewhere     Hyperlipidemia      Kidney calculi      Migraine      Mitral regurgitation      PVC (premature ventricular contraction)      Raynaud's disease      STEMI (ST elevation myocardial infarction) (H) 5/10/2018     Ventricular fibrillation (H)     5/10/18 in ER     Vitamin D deficiency        PAST SURGICAL HISTORY:  Past Surgical History:   Procedure Laterality Date     CV HEART CATHETERIZATION WITH POSSIBLE INTERVENTION N/A 1/23/2019    Procedure: Coronary Angiogram with OCT and IVUS to RCA for ulcerated plaque in CTA;  Surgeon: Jeramy Carter MD;  Location:  HEART CARDIAC CATH LAB     PARATHYROIDECTOMY         FAMILY HISTORY:  Family History   Problem Relation Age of Onset     Arrhythmia Mother      Hyperlipidemia Mother      Hypertension Father      Myocardial Infarction Father 81       SOCIAL HISTORY:  Social History     Socioeconomic History     Marital status:      Spouse name: None     Number of children: None     Years of education: None     Highest education level: None   Occupational History     None   Social Needs     Financial resource strain: None     Food insecurity     Worry: None     Inability: None     Transportation needs     Medical: None     Non-medical: None   Tobacco Use     Smoking status: Never Smoker     Smokeless tobacco: Never Used   Substance and Sexual Activity     Alcohol use: Yes     Comment: rare     Drug use: No     Sexual activity: None   Lifestyle     Physical activity     Days per week: None     Minutes per session: None     Stress: None   Relationships     Social connections     Talks on phone: None     Gets together: None     Attends Yazdanism service: None     Active member of club or organization: None     Attends meetings of clubs or organizations: None     Relationship status: None     Intimate partner violence     Fear of current or ex partner: None     Emotionally  "abused: None     Physically abused: None     Forced sexual activity: None   Other Topics Concern     Parent/sibling w/ CABG, MI or angioplasty before 65F 55M? No   Social History Narrative     None       Review of Systems:  Skin:  Negative     Eyes:  Positive for glasses  ENT:  Negative    Respiratory:  Positive for dyspnea on exertion  Cardiovascular:  palpitations;Negative for;chest pain Positive for;lightheadedness  Gastroenterology: Negative    Genitourinary:  not assessed    Musculoskeletal:  Negative    Neurologic:  Positive for migraine headaches(numbness in left leg into foot)  Psychiatric:  Negative    Heme/Lymph/Imm:  Positive for allergies  Endocrine:  Positive for      Physical Exam:  Vitals: /70 (Patient Position: Standing)   Pulse 64   Ht 1.854 m (6' 1\")   Wt 88.9 kg (196 lb)   BMI 25.86 kg/m      Constitutional:           Skin:             Head:           Eyes:           Lymph:      ENT:           Neck:           Respiratory:            Cardiac:                                                           GI:           Extremities and Muscular Skeletal:                 Neurological:           Psych:         Recent Lab Results:  LIPID RESULTS:  Lab Results   Component Value Date    CHOL 147 05/07/2021    HDL 56 05/07/2021    LDL 79 05/07/2021    TRIG 60 05/07/2021       LIVER ENZYME RESULTS:  Lab Results   Component Value Date    ALT 7 05/18/2020       CBC RESULTS:  Lab Results   Component Value Date    WBC 4.8 01/23/2019    RBC 4.95 01/23/2019    HGB 14.9 01/23/2019    HCT 43.6 01/23/2019    MCV 88 01/23/2019    MCH 30.1 01/23/2019    MCHC 34.2 01/23/2019    RDW 12.7 01/23/2019     01/23/2019       BMP RESULTS:  Lab Results   Component Value Date     05/07/2021    POTASSIUM 3.7 05/07/2021    CHLORIDE 108 05/07/2021    CO2 32 05/07/2021    ANIONGAP 1 (L) 05/07/2021    GLC 94 05/07/2021    BUN 17 05/07/2021    CR 1.11 05/07/2021    GFRESTIMATED 74 05/07/2021    GFRESTBLACK 86 " 05/07/2021    KELY 9.2 05/07/2021        A1C RESULTS:  No results found for: A1C    INR RESULTS:  Lab Results   Component Value Date    INR 1.04 01/23/2019     Thank you for allowing me to participate in the care of your patient.      Sincerely,     Jeramy Carter MD     [facility]    cc:   Jeramy Carter MD  [address]  [place] [phone]

## 2021-05-10 NOTE — PROGRESS NOTES
HPI and Plan:   See dictation    Orders Placed This Encounter   Procedures     Lipid Profile     Follow-Up with Cardiac Advanced Practice Provider     No orders of the defined types were placed in this encounter.    There are no discontinued medications.      Encounter Diagnoses   Name Primary?     ST elevation myocardial infarction (STEMI) involving other coronary artery of inferior wall (H)      Coronary artery disease involving native coronary artery of native heart without angina pectoris      Hypokalemia      Nonrheumatic mitral valve regurgitation      Hyperlipidemia LDL goal <70 Yes       CURRENT MEDICATIONS:  Current Outpatient Medications   Medication Sig Dispense Refill     aspirin (ASA) 81 MG EC tablet Take 1 tablet (81 mg) by mouth daily 90 tablet 3     nebivolol (BYSTOLIC) 2.5 MG tablet Take 1 tablet (2.5 mg) by mouth daily 90 tablet 1     potassium chloride ER (KLOR-CON M) 10 MEQ CR tablet Take 1 tablet (10 mEq) by mouth daily 90 tablet 3     rosuvastatin (CRESTOR) 20 MG tablet Take 1 tablet (20 mg) by mouth daily 90 tablet 3     VITAMIN D, CHOLECALCIFEROL, PO Take 1,000 Units by mouth 2 times daily        nitroGLYcerin (NITROSTAT) 0.4 MG sublingual tablet For chest pain place 1 tablet under the tongue every 5 minutes for 3 doses. If symptoms persist 5 minutes after 1st dose call 911. (Patient not taking: Reported on 8/3/2020) 25 tablet 3       ALLERGIES     Allergies   Allergen Reactions     Augmentin      Coreg [Carvedilol]      Brain fog     Metoprolol      fatigue       PAST MEDICAL HISTORY:  Past Medical History:   Diagnosis Date     Acute myocardial infarction (H) 5/13/2018     CAD (coronary artery disease)     Cath 5/10/18- PCI with revascularization to OM- EVELYN placed; jailing of OM-1--dilated lad 30%; mild disease elsewhere     Hyperlipidemia      Kidney calculi      Migraine      Mitral regurgitation      PVC (premature ventricular contraction)      Raynaud's disease      STEMI (ST elevation  myocardial infarction) (H) 5/10/2018     Ventricular fibrillation (H)     5/10/18 in ER     Vitamin D deficiency        PAST SURGICAL HISTORY:  Past Surgical History:   Procedure Laterality Date     CV HEART CATHETERIZATION WITH POSSIBLE INTERVENTION N/A 1/23/2019    Procedure: Coronary Angiogram with OCT and IVUS to RCA for ulcerated plaque in CTA;  Surgeon: Jeramy Carter MD;  Location:  HEART CARDIAC CATH LAB     PARATHYROIDECTOMY         FAMILY HISTORY:  Family History   Problem Relation Age of Onset     Arrhythmia Mother      Hyperlipidemia Mother      Hypertension Father      Myocardial Infarction Father 81       SOCIAL HISTORY:  Social History     Socioeconomic History     Marital status:      Spouse name: None     Number of children: None     Years of education: None     Highest education level: None   Occupational History     None   Social Needs     Financial resource strain: None     Food insecurity     Worry: None     Inability: None     Transportation needs     Medical: None     Non-medical: None   Tobacco Use     Smoking status: Never Smoker     Smokeless tobacco: Never Used   Substance and Sexual Activity     Alcohol use: Yes     Comment: rare     Drug use: No     Sexual activity: None   Lifestyle     Physical activity     Days per week: None     Minutes per session: None     Stress: None   Relationships     Social connections     Talks on phone: None     Gets together: None     Attends Episcopalian service: None     Active member of club or organization: None     Attends meetings of clubs or organizations: None     Relationship status: None     Intimate partner violence     Fear of current or ex partner: None     Emotionally abused: None     Physically abused: None     Forced sexual activity: None   Other Topics Concern     Parent/sibling w/ CABG, MI or angioplasty before 65F 55M? No   Social History Narrative     None       Review of Systems:  Skin:  Negative     Eyes:  Positive for  "glasses  ENT:  Negative    Respiratory:  Positive for dyspnea on exertion  Cardiovascular:  palpitations;Negative for;chest pain Positive for;lightheadedness  Gastroenterology: Negative    Genitourinary:  not assessed    Musculoskeletal:  Negative    Neurologic:  Positive for migraine headaches(numbness in left leg into foot)  Psychiatric:  Negative    Heme/Lymph/Imm:  Positive for allergies  Endocrine:  Positive for      Physical Exam:  Vitals: /70 (Patient Position: Standing)   Pulse 64   Ht 1.854 m (6' 1\")   Wt 88.9 kg (196 lb)   BMI 25.86 kg/m      Constitutional:           Skin:             Head:           Eyes:           Lymph:      ENT:           Neck:           Respiratory:            Cardiac:                                                           GI:           Extremities and Muscular Skeletal:                 Neurological:           Psych:         Recent Lab Results:  LIPID RESULTS:  Lab Results   Component Value Date    CHOL 147 05/07/2021    HDL 56 05/07/2021    LDL 79 05/07/2021    TRIG 60 05/07/2021       LIVER ENZYME RESULTS:  Lab Results   Component Value Date    ALT 7 05/18/2020       CBC RESULTS:  Lab Results   Component Value Date    WBC 4.8 01/23/2019    RBC 4.95 01/23/2019    HGB 14.9 01/23/2019    HCT 43.6 01/23/2019    MCV 88 01/23/2019    MCH 30.1 01/23/2019    MCHC 34.2 01/23/2019    RDW 12.7 01/23/2019     01/23/2019       BMP RESULTS:  Lab Results   Component Value Date     05/07/2021    POTASSIUM 3.7 05/07/2021    CHLORIDE 108 05/07/2021    CO2 32 05/07/2021    ANIONGAP 1 (L) 05/07/2021    GLC 94 05/07/2021    BUN 17 05/07/2021    CR 1.11 05/07/2021    GFRESTIMATED 74 05/07/2021    GFRESTBLACK 86 05/07/2021    KELY 9.2 05/07/2021        A1C RESULTS:  No results found for: A1C    INR RESULTS:  Lab Results   Component Value Date    INR 1.04 01/23/2019           CC  Jeramy Carter MD  [address]  [place] [phone]  "

## 2021-06-14 DIAGNOSIS — E87.6 HYPOKALEMIA: ICD-10-CM

## 2021-06-14 DIAGNOSIS — I21.19 ST ELEVATION MYOCARDIAL INFARCTION (STEMI) INVOLVING OTHER CORONARY ARTERY OF INFERIOR WALL (H): ICD-10-CM

## 2021-06-14 RX ORDER — ROSUVASTATIN CALCIUM 20 MG/1
20 TABLET, COATED ORAL DAILY
Qty: 90 TABLET | Refills: 3 | Status: SHIPPED | OUTPATIENT
Start: 2021-06-14 | End: 2021-11-30

## 2021-06-14 RX ORDER — POTASSIUM CHLORIDE 750 MG/1
10 TABLET, EXTENDED RELEASE ORAL DAILY
Qty: 90 TABLET | Refills: 3 | Status: SHIPPED | OUTPATIENT
Start: 2021-06-14 | End: 2022-06-15

## 2021-06-14 RX ORDER — NITROGLYCERIN 0.4 MG/1
TABLET SUBLINGUAL
Qty: 25 TABLET | Refills: 3 | Status: SHIPPED | OUTPATIENT
Start: 2021-06-14 | End: 2022-09-14

## 2021-09-19 ENCOUNTER — HEALTH MAINTENANCE LETTER (OUTPATIENT)
Age: 56
End: 2021-09-19

## 2021-11-30 ENCOUNTER — LAB (OUTPATIENT)
Dept: LAB | Facility: CLINIC | Age: 56
End: 2021-11-30
Payer: COMMERCIAL

## 2021-11-30 ENCOUNTER — OFFICE VISIT (OUTPATIENT)
Dept: CARDIOLOGY | Facility: CLINIC | Age: 56
End: 2021-11-30
Payer: COMMERCIAL

## 2021-11-30 VITALS
SYSTOLIC BLOOD PRESSURE: 120 MMHG | HEART RATE: 85 BPM | DIASTOLIC BLOOD PRESSURE: 78 MMHG | WEIGHT: 201.2 LBS | OXYGEN SATURATION: 95 % | BODY MASS INDEX: 26.66 KG/M2 | HEIGHT: 73 IN

## 2021-11-30 DIAGNOSIS — I21.19 ST ELEVATION MYOCARDIAL INFARCTION (STEMI) INVOLVING OTHER CORONARY ARTERY OF INFERIOR WALL (H): ICD-10-CM

## 2021-11-30 DIAGNOSIS — I25.10 CORONARY ARTERY DISEASE INVOLVING NATIVE CORONARY ARTERY OF NATIVE HEART WITHOUT ANGINA PECTORIS: ICD-10-CM

## 2021-11-30 DIAGNOSIS — I25.10 CORONARY ARTERY DISEASE INVOLVING NATIVE CORONARY ARTERY OF NATIVE HEART WITHOUT ANGINA PECTORIS: Primary | ICD-10-CM

## 2021-11-30 LAB
CHOLEST SERPL-MCNC: 152 MG/DL
FASTING STATUS PATIENT QL REPORTED: YES
HDLC SERPL-MCNC: 62 MG/DL
LDLC SERPL CALC-MCNC: 77 MG/DL
NONHDLC SERPL-MCNC: 90 MG/DL
TRIGL SERPL-MCNC: 64 MG/DL

## 2021-11-30 PROCEDURE — 80061 LIPID PANEL: CPT

## 2021-11-30 PROCEDURE — 99214 OFFICE O/P EST MOD 30 MIN: CPT | Performed by: NURSE PRACTITIONER

## 2021-11-30 PROCEDURE — 36415 COLL VENOUS BLD VENIPUNCTURE: CPT

## 2021-11-30 RX ORDER — ROSUVASTATIN CALCIUM 40 MG/1
40 TABLET, COATED ORAL DAILY
Qty: 90 TABLET | Refills: 3 | Status: SHIPPED | OUTPATIENT
Start: 2021-11-30 | End: 2022-09-14

## 2021-11-30 ASSESSMENT — MIFFLIN-ST. JEOR: SCORE: 1801.52

## 2021-11-30 NOTE — PROGRESS NOTES
"History of Present Illness:    Braulio Bunch is a 56 year old male followed here by Dr. Carter who presents for six month follow up.     We met him May 10th, 2018 when he presented with a STEMI.  He has a history of dyslipidemia and had stopped simvastatin for 4 years.  He saw his primary care prior to his MI and went down to his basement and began to do the \"insanity workout\" as he was advised to have lifestyle changes.  He began to have chest pain and was found to have an inferior wall MI while being transported to [facility] by ambulance.  He had a ventricular fibrillation arrest on the way to the Cath Lab.  He was shocked back to sinus rhythm but had torsades directly prior to the angiogram as well. He underwent a 4.0 x 60 mm Synergy drug-eluting stent into his occluded ostial OM 2 branch with jailing of OM-1 with PTCA.  He had a 30% LAD stenosis and the right coronary had mild irregularities      Echo at one point showed more prominent lateral, inferior and anterior RWMA. A CTA was done and looked suspicious for a possible ulcer crater/vulnerable plaque in the mid RCA. This prompted a repeat coronary angiogram using OCT. No concerning lesions were found. EF always appears lower on LV gram than echo. He has completed one month of DAPT.     Most recent echocardiogram reveals EF of 55-60% with basal to mid inferior, inferolateral/anterolateral wall HK; normal RV and 1+MR    He has had partial parathyroidectomy in the past.    He has struggled with low BP's and orthostasis in the past and ED/Raynaud's in the past on betablockers which improved on Bystolic. He had been on a very low dose of Bystolic 2.5mg and last visit Dr. Carter left it up to him to decide if he wanted to continue this or not. He stopped this and his orthostatic symptoms seem better and he has no orthostatic drop today.    He had gained some weight during COVID and Lipids were up slightly with LDL 79 on Crestor 20mg daily    Lipids " today: total cholesterol 152 HDL 62 LDL 77 TG 64    He is feeling well today with no cardiac symptoms     Impression/Plan:     1.  Coronary artery disease withST segment elevation myocardial infarction in May of 2018 treated with drug-eluting stent to the circumflex    -no angina    2.  Hx Ventricular fibrillation arrest on the way to the Cath Lab with intermittent torsades after he was defibrillated.  -No further arrhythmias       3.  Dyslipidemia uncontrolled  -given his history, I suggested increasing Crestor 40mg daily  Check lipid panel and alt in 3-4 months     4.  Vitamin D deficiency on replacement through primary care     5. Low BP with orthostatic hypotension associated with dizziness at times   This has resolved off of bystolic     6. Raynaud's phenomenon- intolerant to coreg          7. Screening for sleep apnea with overnight oximetry  -results unremarkable--      8. Hypokalemia--mild  -he eats a potassium laden diet  -has hx of pararthyroid surgery  -potassium 3.7 in May  -continue KCL 10meq daily    See us back in May for annual visit.  22 minutes spent on the date of the encounter doing chart review, review of test results, patient visit and documentation   It has been a pleasure seeing Braulio Bunch in follow up    Floresita Tyson, MSN, APRN-BC, CNP  Cardiology    No orders of the defined types were placed in this encounter.    No orders of the defined types were placed in this encounter.    There are no discontinued medications.      No diagnosis found.    CURRENT MEDICATIONS:  Current Outpatient Medications   Medication Sig Dispense Refill     aspirin (ASA) 81 MG EC tablet Take 1 tablet (81 mg) by mouth daily 90 tablet 3     nebivolol (BYSTOLIC) 2.5 MG tablet Take 1 tablet (2.5 mg) by mouth daily 90 tablet 1     nitroGLYcerin (NITROSTAT) 0.4 MG sublingual tablet For chest pain place 1 tablet under the tongue every 5 minutes for 3 doses. If symptoms persist 5 minutes after 1st dose call 911.  25 tablet 3     potassium chloride ER (KLOR-CON M) 10 MEQ CR tablet Take 1 tablet (10 mEq) by mouth daily 90 tablet 3     rosuvastatin (CRESTOR) 20 MG tablet Take 1 tablet (20 mg) by mouth daily 90 tablet 3     VITAMIN D, CHOLECALCIFEROL, PO Take 1,000 Units by mouth 2 times daily          ALLERGIES     Allergies   Allergen Reactions     Augmentin      Coreg [Carvedilol]      Brain fog     Metoprolol      fatigue       PAST MEDICAL HISTORY:  Past Medical History:   Diagnosis Date     Acute myocardial infarction (H) 5/13/2018     CAD (coronary artery disease)     Cath 5/10/18- PCI with revascularization to OM- EVELYN placed; jailing of OM-1--dilated lad 30%; mild disease elsewhere     Hyperlipidemia      Kidney calculi      Migraine      Mitral regurgitation      PVC (premature ventricular contraction)      Raynaud's disease      STEMI (ST elevation myocardial infarction) (H) 5/10/2018     Ventricular fibrillation (H)     5/10/18 in ER     Vitamin D deficiency        PAST SURGICAL HISTORY:  Past Surgical History:   Procedure Laterality Date     CV HEART CATHETERIZATION WITH POSSIBLE INTERVENTION N/A 1/23/2019    Procedure: Coronary Angiogram with OCT and IVUS to RCA for ulcerated plaque in CTA;  Surgeon: Jeramy Carter MD;  Location:  HEART CARDIAC CATH LAB     PARATHYROIDECTOMY         FAMILY HISTORY:  Family History   Problem Relation Age of Onset     Arrhythmia Mother      Hyperlipidemia Mother      Hypertension Father      Myocardial Infarction Father 81       SOCIAL HISTORY:  Social History     Socioeconomic History     Marital status:      Spouse name: Not on file     Number of children: Not on file     Years of education: Not on file     Highest education level: Not on file   Occupational History     Not on file   Tobacco Use     Smoking status: Never Smoker     Smokeless tobacco: Never Used   Substance and Sexual Activity     Alcohol use: Yes     Comment: rare     Drug use: No     Sexual activity:  Not on file   Other Topics Concern     Parent/sibling w/ CABG, MI or angioplasty before 65F 55M? No   Social History Narrative     Not on file     Social Determinants of Health     Financial Resource Strain: Not on file   Food Insecurity: Not on file   Transportation Needs: Not on file   Physical Activity: Not on file   Stress: Not on file   Social Connections: Not on file   Intimate Partner Violence: Not on file   Housing Stability: Not on file       Review of Systems:  Skin:          Eyes:         ENT:         Respiratory:          Cardiovascular:         Gastroenterology:        Genitourinary:         Musculoskeletal:         Neurologic:         Psychiatric:         Heme/Lymph/Imm:         Endocrine:           Physical Exam:  Vitals: There were no vitals taken for this visit.    Constitutional:           Skin:             Head:           Eyes:           Lymph:      ENT:           Neck:           Respiratory:            Cardiac:                                                           GI:           Extremities and Muscular Skeletal:                 Neurological:           Psych:         Recent Lab Results:  LIPID RESULTS:  Lab Results   Component Value Date    CHOL 147 05/07/2021    HDL 56 05/07/2021    LDL 79 05/07/2021    TRIG 60 05/07/2021       LIVER ENZYME RESULTS:  Lab Results   Component Value Date    ALT 7 05/18/2020       CBC RESULTS:  Lab Results   Component Value Date    WBC 4.8 01/23/2019    RBC 4.95 01/23/2019    HGB 14.9 01/23/2019    HCT 43.6 01/23/2019    MCV 88 01/23/2019    MCH 30.1 01/23/2019    MCHC 34.2 01/23/2019    RDW 12.7 01/23/2019     01/23/2019       BMP RESULTS:  Lab Results   Component Value Date     05/07/2021    POTASSIUM 3.7 05/07/2021    CHLORIDE 108 05/07/2021    CO2 32 05/07/2021    ANIONGAP 1 (L) 05/07/2021    GLC 94 05/07/2021    BUN 17 05/07/2021    CR 1.11 05/07/2021    GFRESTIMATED 74 05/07/2021    GFRESTBLACK 86 05/07/2021    KELY 9.2 05/07/2021        A1C  RESULTS:  No results found for: A1C    INR RESULTS:  Lab Results   Component Value Date    INR 1.04 01/23/2019           CC  Jeramy Carter MD  [id number]  [place] [id number]

## 2021-11-30 NOTE — LETTER
"11/30/2021    Josué Vaughn MD  [facility] [address]  [facility] [address]    RE: Braulio Bunch       Dear Colleague,    I had the pleasure of seeing Braulio Bunch in the [facility].  History of Present Illness:    Braulio Bunch is a 56 year old male followed here by Dr. Carter who presents for six month follow up.     We met him May 10th, 2018 when he presented with a STEMI.  He has a history of dyslipidemia and had stopped simvastatin for 4 years.  He saw his primary care prior to his MI and went down to his basement and began to do the \"insanity workout\" as he was advised to have lifestyle changes.  He began to have chest pain and was found to have an inferior wall MI while being transported to [facility] by ambulance.  He had a ventricular fibrillation arrest on the way to the Cath Lab.  He was shocked back to sinus rhythm but had torsades directly prior to the angiogram as well. He underwent a 4.0 x 60 mm Synergy drug-eluting stent into his occluded ostial OM 2 branch with jailing of OM-1 with PTCA.  He had a 30% LAD stenosis and the right coronary had mild irregularities      Echo at one point showed more prominent lateral, inferior and anterior RWMA. A CTA was done and looked suspicious for a possible ulcer crater/vulnerable plaque in the mid RCA. This prompted a repeat coronary angiogram using OCT. No concerning lesions were found. EF always appears lower on LV gram than echo. He has completed one month of DAPT.     Most recent echocardiogram reveals EF of 55-60% with basal to mid inferior, inferolateral/anterolateral wall HK; normal RV and 1+MR    He has had partial parathyroidectomy in the past.    He has struggled with low BP's and orthostasis in the past and ED/Raynaud's in the past on betablockers which improved on Bystolic. He had been on a very low dose of Bystolic 2.5mg and last visit Dr. Carter left it up to " him to decide if he wanted to continue this or not. He stopped this and his orthostatic symptoms seem better and he has no orthostatic drop today.    He had gained some weight during COVID and Lipids were up slightly with LDL 79 on Crestor 20mg daily    Lipids today: total cholesterol 152 HDL 62 LDL 77 TG 64    He is feeling well today with no cardiac symptoms     Impression/Plan:     1.  Coronary artery disease withST segment elevation myocardial infarction in May of 2018 treated with drug-eluting stent to the circumflex    -no angina    2.  Hx Ventricular fibrillation arrest on the way to the Cath Lab with intermittent torsades after he was defibrillated.  -No further arrhythmias       3.  Dyslipidemia uncontrolled  -given his history, I suggested increasing Crestor 40mg daily  Check lipid panel and alt in 3-4 months     4.  Vitamin D deficiency on replacement through primary care     5. Low BP with orthostatic hypotension associated with dizziness at times   This has resolved off of bystolic     6. Raynaud's phenomenon- intolerant to coreg          7. Screening for sleep apnea with overnight oximetry  -results unremarkable--      8. Hypokalemia--mild  -he eats a potassium laden diet  -has hx of pararthyroid surgery  -potassium 3.7 in May  -continue KCL 10meq daily    See us back in May for annual visit.  22 minutes spent on the date of the encounter doing chart review, review of test results, patient visit and documentation   It has been a pleasure seeing Braulio Bunch in follow up    Floresita Tyson, MSN, APRN-BC, CNP  Cardiology    No orders of the defined types were placed in this encounter.    No orders of the defined types were placed in this encounter.    There are no discontinued medications.      No diagnosis found.    CURRENT MEDICATIONS:  Current Outpatient Medications   Medication Sig Dispense Refill     aspirin (ASA) 81 MG EC tablet Take 1 tablet (81 mg) by mouth daily 90 tablet 3     nebivolol  (BYSTOLIC) 2.5 MG tablet Take 1 tablet (2.5 mg) by mouth daily 90 tablet 1     nitroGLYcerin (NITROSTAT) 0.4 MG sublingual tablet For chest pain place 1 tablet under the tongue every 5 minutes for 3 doses. If symptoms persist 5 minutes after 1st dose call 911. 25 tablet 3     potassium chloride ER (KLOR-CON M) 10 MEQ CR tablet Take 1 tablet (10 mEq) by mouth daily 90 tablet 3     rosuvastatin (CRESTOR) 20 MG tablet Take 1 tablet (20 mg) by mouth daily 90 tablet 3     VITAMIN D, CHOLECALCIFEROL, PO Take 1,000 Units by mouth 2 times daily          ALLERGIES     Allergies   Allergen Reactions     Augmentin      Coreg [Carvedilol]      Brain fog     Metoprolol      fatigue       PAST MEDICAL HISTORY:  Past Medical History:   Diagnosis Date     Acute myocardial infarction (H) 5/13/2018     CAD (coronary artery disease)     Cath 5/10/18- PCI with revascularization to OM- EVELYN placed; jailing of OM-1--dilated lad 30%; mild disease elsewhere     Hyperlipidemia      Kidney calculi      Migraine      Mitral regurgitation      PVC (premature ventricular contraction)      Raynaud's disease      STEMI (ST elevation myocardial infarction) (H) 5/10/2018     Ventricular fibrillation (H)     5/10/18 in ER     Vitamin D deficiency        PAST SURGICAL HISTORY:  Past Surgical History:   Procedure Laterality Date     CV HEART CATHETERIZATION WITH POSSIBLE INTERVENTION N/A 1/23/2019    Procedure: Coronary Angiogram with OCT and IVUS to RCA for ulcerated plaque in CTA;  Surgeon: Jeramy Carter MD;  Location:  HEART CARDIAC CATH LAB     PARATHYROIDECTOMY         FAMILY HISTORY:  Family History   Problem Relation Age of Onset     Arrhythmia Mother      Hyperlipidemia Mother      Hypertension Father      Myocardial Infarction Father 81       SOCIAL HISTORY:  Social History     Socioeconomic History     Marital status:      Spouse name: Not on file     Number of children: Not on file     Years of education: Not on file      Highest education level: Not on file   Occupational History     Not on file   Tobacco Use     Smoking status: Never Smoker     Smokeless tobacco: Never Used   Substance and Sexual Activity     Alcohol use: Yes     Comment: rare     Drug use: No     Sexual activity: Not on file   Other Topics Concern     Parent/sibling w/ CABG, MI or angioplasty before 65F 55M? No   Social History Narrative     Not on file     Social Determinants of Health     Financial Resource Strain: Not on file   Food Insecurity: Not on file   Transportation Needs: Not on file   Physical Activity: Not on file   Stress: Not on file   Social Connections: Not on file   Intimate Partner Violence: Not on file   Housing Stability: Not on file       Review of Systems:  Skin:          Eyes:         ENT:         Respiratory:          Cardiovascular:         Gastroenterology:        Genitourinary:         Musculoskeletal:         Neurologic:         Psychiatric:         Heme/Lymph/Imm:         Endocrine:           Physical Exam:  Vitals: There were no vitals taken for this visit.    Constitutional:           Skin:             Head:           Eyes:           Lymph:      ENT:           Neck:           Respiratory:            Cardiac:                                                           GI:           Extremities and Muscular Skeletal:                 Neurological:           Psych:         Recent Lab Results:  LIPID RESULTS:  Lab Results   Component Value Date    CHOL 147 05/07/2021    HDL 56 05/07/2021    LDL 79 05/07/2021    TRIG 60 05/07/2021       LIVER ENZYME RESULTS:  Lab Results   Component Value Date    ALT 7 05/18/2020       CBC RESULTS:  Lab Results   Component Value Date    WBC 4.8 01/23/2019    RBC 4.95 01/23/2019    HGB 14.9 01/23/2019    HCT 43.6 01/23/2019    MCV 88 01/23/2019    MCH 30.1 01/23/2019    MCHC 34.2 01/23/2019    RDW 12.7 01/23/2019     01/23/2019       BMP RESULTS:  Lab Results   Component Value Date      05/07/2021    POTASSIUM 3.7 05/07/2021    CHLORIDE 108 05/07/2021    CO2 32 05/07/2021    ANIONGAP 1 (L) 05/07/2021    GLC 94 05/07/2021    BUN 17 05/07/2021    CR 1.11 05/07/2021    GFRESTIMATED 74 05/07/2021    GFRESTBLACK 86 05/07/2021    KELY 9.2 05/07/2021        A1C RESULTS:  No results found for: A1C    INR RESULTS:  Lab Results   Component Value Date    INR 1.04 01/23/2019         [facility]      cc:   Jeramy Carter MD  [id number]  [place] [id number]

## 2021-11-30 NOTE — PATIENT INSTRUCTIONS
Increase your rosuvastatin to 40mg daily    Labs fasting in 3-4 months and we will call or message you with results  Goal LDL less than 70    See us back in May with fasting labs

## 2022-01-09 ENCOUNTER — HEALTH MAINTENANCE LETTER (OUTPATIENT)
Age: 57
End: 2022-01-09

## 2022-02-07 ENCOUNTER — LAB (OUTPATIENT)
Dept: LAB | Facility: CLINIC | Age: 57
End: 2022-02-07
Payer: COMMERCIAL

## 2022-02-07 ENCOUNTER — TELEPHONE (OUTPATIENT)
Dept: CARDIOLOGY | Facility: CLINIC | Age: 57
End: 2022-02-07

## 2022-02-07 DIAGNOSIS — I25.10 CORONARY ARTERY DISEASE INVOLVING NATIVE CORONARY ARTERY OF NATIVE HEART WITHOUT ANGINA PECTORIS: ICD-10-CM

## 2022-02-07 LAB
ALT SERPL W P-5'-P-CCNC: 25 U/L (ref 0–70)
CHOLEST SERPL-MCNC: 139 MG/DL
FASTING STATUS PATIENT QL REPORTED: YES
HDLC SERPL-MCNC: 63 MG/DL
LDLC SERPL CALC-MCNC: 65 MG/DL
NONHDLC SERPL-MCNC: 76 MG/DL
TRIGL SERPL-MCNC: 54 MG/DL

## 2022-02-07 PROCEDURE — 80061 LIPID PANEL: CPT

## 2022-02-07 PROCEDURE — 84460 ALANINE AMINO (ALT) (SGPT): CPT

## 2022-02-07 PROCEDURE — 36415 COLL VENOUS BLD VENIPUNCTURE: CPT

## 2022-02-07 NOTE — TELEPHONE ENCOUNTER
" He has a history of dyslipidemia and had stopped simvastatin for 4 years.  He saw his primary care prior to his MI and went down to his basement and began to do the \"insanity workout\" as he was advised to have lifestyle changes.  He began to have chest pain and was found to have an inferior wall MI while being transported to Per NP Per NP note:  [facility] by ambulance.  He had a ventricular fibrillation arrest on the way to the Cath Lab.  He was shocked back to sinus rhythm but had torsades directly prior to the angiogram as well. He underwent a 4.0 x 60 mm Synergy drug-eluting stent into his occluded ostial OM 2 branch with jailing of OM-1 with PTCA.  He had a 30% LAD stenosis and the right coronary had mild irregularities     He had gained some weight during COVID and Lipids were up slightly with LDL 79 on Crestor 20mg daily    3.  Dyslipidemia uncontrolled  -given his history, I suggested increasing Crestor 40mg daily  Check lipid panel and alt in 3-4 months  Component      Latest Ref Rng & Units 2/7/2022   Cholesterol      <200 mg/dL 139   Triglycerides      <150 mg/dL 54   HDL Cholesterol      >=40 mg/dL 63   LDL Cholesterol Calculated      <=100 mg/dL 65   Non HDL Cholesterol      <130 mg/dL 76   Patient Fasting > 8hrs?       Yes   ALT 7.  Results for review. RICHIE Mclaughlin RN  "

## 2022-02-23 NOTE — TELEPHONE ENCOUNTER
Called Pt left message labs are at goal if any issues with medication myalgias or dark colored urine please call clinic.  RICHIE Mclaughlin RN

## 2022-06-15 DIAGNOSIS — E87.6 HYPOKALEMIA: ICD-10-CM

## 2022-06-15 RX ORDER — POTASSIUM CHLORIDE 750 MG/1
10 TABLET, EXTENDED RELEASE ORAL DAILY
Qty: 90 TABLET | Refills: 0 | Status: SHIPPED | OUTPATIENT
Start: 2022-06-15 | End: 2022-09-14

## 2022-09-14 ENCOUNTER — OFFICE VISIT (OUTPATIENT)
Dept: CARDIOLOGY | Facility: CLINIC | Age: 57
End: 2022-09-14
Payer: COMMERCIAL

## 2022-09-14 VITALS
SYSTOLIC BLOOD PRESSURE: 110 MMHG | BODY MASS INDEX: 27.17 KG/M2 | OXYGEN SATURATION: 99 % | HEART RATE: 70 BPM | HEIGHT: 73 IN | DIASTOLIC BLOOD PRESSURE: 70 MMHG | WEIGHT: 205 LBS

## 2022-09-14 DIAGNOSIS — E87.6 HYPOKALEMIA: ICD-10-CM

## 2022-09-14 DIAGNOSIS — I21.19 ST ELEVATION MYOCARDIAL INFARCTION (STEMI) INVOLVING OTHER CORONARY ARTERY OF INFERIOR WALL (H): ICD-10-CM

## 2022-09-14 DIAGNOSIS — I25.10 CORONARY ARTERY DISEASE INVOLVING NATIVE CORONARY ARTERY OF NATIVE HEART WITHOUT ANGINA PECTORIS: ICD-10-CM

## 2022-09-14 PROCEDURE — 99214 OFFICE O/P EST MOD 30 MIN: CPT | Performed by: INTERNAL MEDICINE

## 2022-09-14 RX ORDER — POTASSIUM CHLORIDE 750 MG/1
10 TABLET, EXTENDED RELEASE ORAL DAILY
Qty: 90 TABLET | Refills: 3 | Status: SHIPPED | OUTPATIENT
Start: 2022-09-14 | End: 2023-09-11

## 2022-09-14 RX ORDER — ROSUVASTATIN CALCIUM 40 MG/1
40 TABLET, COATED ORAL DAILY
Qty: 90 TABLET | Refills: 3 | Status: SHIPPED | OUTPATIENT
Start: 2022-09-14

## 2022-09-14 RX ORDER — NITROGLYCERIN 0.4 MG/1
TABLET SUBLINGUAL
Qty: 25 TABLET | Refills: 3 | Status: SHIPPED | OUTPATIENT
Start: 2022-09-14 | End: 2023-09-22

## 2022-09-14 NOTE — LETTER
9/14/2022    Josesito Angel MD  [address] [place] [address]    RE: Braulio Bunch       Dear Colleague,     I had the pleasure of seeing Braulio Bunch in the [facility].  HPI and Plan:   See dictation    Orders Placed This Encounter   Procedures     Follow-Up with Cardiologist     Orders Placed This Encounter   Medications     Cyanocobalamin 5000 MCG TBDP     Sig: Take 5,000 mcg by mouth daily     rosuvastatin (CRESTOR) 40 MG tablet     Sig: Take 1 tablet (40 mg) by mouth daily     Dispense:  90 tablet     Refill:  3     nitroGLYcerin (NITROSTAT) 0.4 MG sublingual tablet     Sig: For chest pain place 1 tablet under the tongue every 5 minutes for 3 doses. If symptoms persist 5 minutes after 1st dose call 911.     Dispense:  25 tablet     Refill:  3     aspirin (ASA) 81 MG EC tablet     Sig: Take 1 tablet (81 mg) by mouth daily     Dispense:  90 tablet     Refill:  3     potassium chloride ER (KLOR-CON M) 10 MEQ CR tablet     Sig: Take 1 tablet (10 mEq) by mouth daily     Dispense:  90 tablet     Refill:  3     Medications Discontinued During This Encounter   Medication Reason     aspirin (ASA) 81 MG EC tablet Reorder     nitroGLYcerin (NITROSTAT) 0.4 MG sublingual tablet Reorder     rosuvastatin (CRESTOR) 40 MG tablet Reorder     potassium chloride ER (KLOR-CON M) 10 MEQ CR tablet Reorder         Encounter Diagnoses   Name Primary?     Coronary artery disease involving native coronary artery of native heart without angina pectoris      ST elevation myocardial infarction (STEMI) involving other coronary artery of inferior wall (H)      Hypokalemia        CURRENT MEDICATIONS:  Current Outpatient Medications   Medication Sig Dispense Refill     aspirin (ASA) 81 MG EC tablet Take 1 tablet (81 mg) by mouth daily 90 tablet 3     Cyanocobalamin 5000 MCG TBDP Take 5,000 mcg by mouth daily       nitroGLYcerin (NITROSTAT) 0.4 MG sublingual tablet For chest pain place 1 tablet under the tongue  every 5 minutes for 3 doses. If symptoms persist 5 minutes after 1st dose call 911. 25 tablet 3     potassium chloride ER (KLOR-CON M) 10 MEQ CR tablet Take 1 tablet (10 mEq) by mouth daily 90 tablet 3     rosuvastatin (CRESTOR) 40 MG tablet Take 1 tablet (40 mg) by mouth daily 90 tablet 3     VITAMIN D, CHOLECALCIFEROL, PO Take 1,000 Units by mouth 2 times daily Pt takes 2000 units in the am and 1000 units in the PM         ALLERGIES     Allergies   Allergen Reactions     Augmentin      Coreg [Carvedilol]      Brain fog     Metoprolol      fatigue       PAST MEDICAL HISTORY:  Past Medical History:   Diagnosis Date     CAD (coronary artery disease)     Cath 5/10/18-lateral MI,  VF arrest,  PCI with revascularization to OM- EVELYN placed; jailing of OM-1--dilated lad 30%; mild disease elsewhere     Hyperlipidemia      Hyperparathyroidism (H)      Infection due to 2019 novel coronavirus 08/2022     Kidney calculi      Migraine      Mitral regurgitation      PVC (premature ventricular contraction)      Raynaud's disease      Vitamin B12 deficiency (non anemic)      Vitamin D deficiency        PAST SURGICAL HISTORY:  Past Surgical History:   Procedure Laterality Date     CV HEART CATHETERIZATION WITH POSSIBLE INTERVENTION N/A 1/23/2019    Procedure: Coronary Angiogram with OCT and IVUS to RCA for ulcerated plaque in CTA;  Surgeon: Jeramy Carter MD;  Location:  HEART CARDIAC CATH LAB     PARATHYROIDECTOMY         FAMILY HISTORY:  Family History   Problem Relation Age of Onset     Arrhythmia Mother      Hyperlipidemia Mother      Hypertension Father      Myocardial Infarction Father 81       SOCIAL HISTORY:  Social History     Socioeconomic History     Marital status:      Spouse name: None     Number of children: None     Years of education: None     Highest education level: None   Tobacco Use     Smoking status: Never Smoker     Smokeless tobacco: Never Used   Substance and Sexual Activity     Alcohol use:  "Yes     Comment: rare     Drug use: No   Other Topics Concern     Parent/sibling w/ CABG, MI or angioplasty before 65F 55M? No       Review of Systems:  Skin:  not assessed     Eyes:  not assessed    ENT:  not assessed    Respiratory:  Negative    Cardiovascular:    chest pain;palpitations;Positive for  Gastroenterology: not assessed    Genitourinary:  not assessed    Musculoskeletal:  not assessed    Neurologic:  not assessed    Psychiatric:  not assessed    Heme/Lymph/Imm:  not assessed    Endocrine:  not assessed      Physical Exam:  Vitals: /70 (BP Location: Right arm, Patient Position: Sitting, Cuff Size: Adult Large)   Pulse 70   Ht 1.854 m (6' 1\")   Wt 93 kg (205 lb)   SpO2 99%   BMI 27.05 kg/m      Constitutional:  cooperative, alert and oriented, well developed, well nourished, in no acute distress        Skin:  warm and dry to the touch, no apparent skin lesions or masses noted          Head:  normocephalic, no masses or lesions        Eyes:  pupils equal and round, conjunctivae and lids unremarkable, sclera white, no xanthalasma, EOMS intact, no nystagmus        Lymph:No Cervical lymphadenopathy present;No thyromegaly     ENT:           Neck:  carotid pulses are full and equal bilaterally, JVP normal, no carotid bruit        Respiratory:  normal breath sounds, clear to auscultation, normal A-P diameter, normal symmetry, normal respiratory excursion, no use of accessory muscles         Cardiac: regular rhythm, normal S1/S2, no S3 or S4, apical impulse not displaced, no murmurs, gallops or rubs                pulses full and equal, no bruits auscultated                                        GI:  abdomen soft, non-tender, BS normoactive, no mass, no HSM, no bruits        Extremities and Muscular Skeletal:  no deformities, clubbing, cyanosis, erythema observed;no edema              Neurological:  no gross motor deficits        Psych:  Alert and Oriented x 3      Recent Lab Results:  LIPID " "RESULTS:  Lab Results   Component Value Date    CHOL 139 02/07/2022    CHOL 147 05/07/2021    HDL 63 02/07/2022    HDL 56 05/07/2021    LDL 65 02/07/2022    LDL 79 05/07/2021    TRIG 54 02/07/2022    TRIG 60 05/07/2021       LIVER ENZYME RESULTS:  Lab Results   Component Value Date    ALT 25 02/07/2022    ALT 7 05/18/2020       CBC RESULTS:  Lab Results   Component Value Date    WBC 4.8 01/23/2019    RBC 4.95 01/23/2019    HGB 14.9 01/23/2019    HCT 43.6 01/23/2019    MCV 88 01/23/2019    MCH 30.1 01/23/2019    MCHC 34.2 01/23/2019    RDW 12.7 01/23/2019     01/23/2019       BMP RESULTS:  Lab Results   Component Value Date     05/07/2021    POTASSIUM 3.7 05/07/2021    CHLORIDE 108 05/07/2021    CO2 32 05/07/2021    ANIONGAP 1 (L) 05/07/2021    GLC 94 05/07/2021    BUN 17 05/07/2021    CR 1.11 05/07/2021    GFRESTIMATED 74 05/07/2021    GFRESTBLACK 86 05/07/2021    KELY 9.2 05/07/2021        A1C RESULTS:  No results found for: A1C    INR RESULTS:  Lab Results   Component Value Date    INR 1.04 01/23/2019           CC  ENRIKE Edwards CNP  [address]  [place] [id number]    Service Date: 09/14/2022    Braulio Bunch is a delightful, fit 57-year-old gentleman.  He saw his primary doctor a few years ago back in 2018. He noted that his cholesterol was quite high.  His doctor told him to exercise more to lower his cholesterol.  The patient went directly to \"[employer] workout\" and promptly had a heart attack inferolateral, and then on transport to our cath lab, he had a V-fib cardiac arrest and he was resuscitated.  Heart cath showed the circumflex marginal was the problem.  OM2 was occluded and we stented it with a 4.0 stent.  We did balloon angioplasty on the side.  There was also mild LAD and right coronary disease.  The patient has done well since that time, has no more problems.  His cholesterol numbers with us last year were quite good.  His LDL jumped up a little bit to 82.  It had " been very high.  I did not mention above, but he had been on a cholesterol pill years before his heart attack, and he self discontinued.  When that happened, the cholesterol numbers went quite high.  He just saw Dr. Angel in the last month.  Cholesterol numbers are starting to creep up again.  I told the patient to call Dr. Angel:  I would recommend that they wait 3-4 months, recheck the cholesterol numbers, and if it still LDL above 70 to add Zetia.  He is off beta blocker because it exacerbated his Raynaud.  Today we discussed his cholesterol numbers.  I also reviewed all the lab tests from Dr. Angel's office.  His potassium was low.  It is not clear why it was ever low.  He was never on a diuretic.  He is on low-dose potassium, and at the last check, it was normal.  He has a history of hyperparathyroidism and that shows now normal calcium levels.  We reviewed all of his other lipids, electrolytes.  At this juncture, then I have renewed his medicines.  I will ask Dr. Angel if he would kindly renew the medicines next year.  I do not think I need to see the patient for 2 years.  I plan on doing a stress echo test in 2 years, that would be  which is 6 years after his original heart attack.    Today's visit was a 35-minute visit including the time to review our echo test.  We also reviewed some of the cholesterol trials to explain to the patient how the cholesterol works, not only by lowering the cholesterol number but lowering inflammation and we reviewed his medications and the outside labs from Dr. Angel.    Jeramy Carter MD    cc:  Josesito Angel MD  [facility]  [address]  [place]  [address]    Jeramy Carter MD        D: 2022   T: 2022   MT: danisha    Name:     BRIEN KUHN  MRN:      [id number]        Account:      [id number]   :      1965           Service Date: 2022       Document: [id number]      Thank you for allowing me to  participate in the care of your patient.      Sincerely,     Jeramy Carter MD     [facility]  cc:   ENRIKE Edwards CNP  [address]  [place] [id number]

## 2022-09-14 NOTE — PROGRESS NOTES
"Service Date: 09/14/2022    Braulio Bunch is a delightful, fit 57-year-old gentleman.  He saw his primary doctor a few years ago back in 2018. He noted that his cholesterol was quite high.  His doctor told him to exercise more to lower his cholesterol.  The patient went directly to \"[employer] workout\" and promptly had a heart attack inferolateral, and then on transport to our cath lab, he had a V-fib cardiac arrest and he was resuscitated.  Heart cath showed the circumflex marginal was the problem.  OM2 was occluded and we stented it with a 4.0 stent.  We did balloon angioplasty on the side.  There was also mild LAD and right coronary disease.  The patient has done well since that time, has no more problems.  His cholesterol numbers with us last year were quite good.  His LDL jumped up a little bit to 82.  It had been very high.  I did not mention above, but he had been on a cholesterol pill years before his heart attack, and he self discontinued.  When that happened, the cholesterol numbers went quite high.  He just saw Dr. Angel in the last month.  Cholesterol numbers are starting to creep up again.  I told the patient to call Dr. Angel:  I would recommend that they wait 3-4 months, recheck the cholesterol numbers, and if it still LDL above 70 to add Zetia.  He is off beta blocker because it exacerbated his Raynaud.  Today we discussed his cholesterol numbers.  I also reviewed all the lab tests from Dr. Angel's office.  His potassium was low.  It is not clear why it was ever low.  He was never on a diuretic.  He is on low-dose potassium, and at the last check, it was normal.  He has a history of hyperparathyroidism and that shows now normal calcium levels.  We reviewed all of his other lipids, electrolytes.  At this juncture, then I have renewed his medicines.  I will ask Dr. Angel if he would kindly renew the medicines next year.  I do not think I need to see the patient for 2 years.  I plan on doing a stress echo " test in 2 years, that would be  which is 6 years after his original heart attack.    Today's visit was a 35-minute visit including the time to review our echo test.  We also reviewed some of the cholesterol trials to explain to the patient how the cholesterol works, not only by lowering the cholesterol number but lowering inflammation and we reviewed his medications and the outside labs from Dr. Angel.    Jeramy Carter MD    cc:  Josesito Angle MD  [facility]  [address]  [place]  [address]    Jeramy Carter MD        D: 2022   T: 2022   MT: danisha    Name:     BRIEN KUHN  MRN:      [id number]        Account:      [id number]   :      1965           Service Date: 2022       Document: [id number]

## 2022-09-14 NOTE — PROGRESS NOTES
HPI and Plan:   See dictation    Orders Placed This Encounter   Procedures     Follow-Up with Cardiologist     Orders Placed This Encounter   Medications     Cyanocobalamin 5000 MCG TBDP     Sig: Take 5,000 mcg by mouth daily     rosuvastatin (CRESTOR) 40 MG tablet     Sig: Take 1 tablet (40 mg) by mouth daily     Dispense:  90 tablet     Refill:  3     nitroGLYcerin (NITROSTAT) 0.4 MG sublingual tablet     Sig: For chest pain place 1 tablet under the tongue every 5 minutes for 3 doses. If symptoms persist 5 minutes after 1st dose call 911.     Dispense:  25 tablet     Refill:  3     aspirin (ASA) 81 MG EC tablet     Sig: Take 1 tablet (81 mg) by mouth daily     Dispense:  90 tablet     Refill:  3     potassium chloride ER (KLOR-CON M) 10 MEQ CR tablet     Sig: Take 1 tablet (10 mEq) by mouth daily     Dispense:  90 tablet     Refill:  3     Medications Discontinued During This Encounter   Medication Reason     aspirin (ASA) 81 MG EC tablet Reorder     nitroGLYcerin (NITROSTAT) 0.4 MG sublingual tablet Reorder     rosuvastatin (CRESTOR) 40 MG tablet Reorder     potassium chloride ER (KLOR-CON M) 10 MEQ CR tablet Reorder         Encounter Diagnoses   Name Primary?     Coronary artery disease involving native coronary artery of native heart without angina pectoris      ST elevation myocardial infarction (STEMI) involving other coronary artery of inferior wall (H)      Hypokalemia        CURRENT MEDICATIONS:  Current Outpatient Medications   Medication Sig Dispense Refill     aspirin (ASA) 81 MG EC tablet Take 1 tablet (81 mg) by mouth daily 90 tablet 3     Cyanocobalamin 5000 MCG TBDP Take 5,000 mcg by mouth daily       nitroGLYcerin (NITROSTAT) 0.4 MG sublingual tablet For chest pain place 1 tablet under the tongue every 5 minutes for 3 doses. If symptoms persist 5 minutes after 1st dose call 911. 25 tablet 3     potassium chloride ER (KLOR-CON M) 10 MEQ CR tablet Take 1 tablet (10 mEq) by mouth daily 90 tablet 3      rosuvastatin (CRESTOR) 40 MG tablet Take 1 tablet (40 mg) by mouth daily 90 tablet 3     VITAMIN D, CHOLECALCIFEROL, PO Take 1,000 Units by mouth 2 times daily Pt takes 2000 units in the am and 1000 units in the PM         ALLERGIES     Allergies   Allergen Reactions     Augmentin      Coreg [Carvedilol]      Brain fog     Metoprolol      fatigue       PAST MEDICAL HISTORY:  Past Medical History:   Diagnosis Date     CAD (coronary artery disease)     Cath 5/10/18-lateral MI,  VF arrest,  PCI with revascularization to OM- EVELYN placed; jailing of OM-1--dilated lad 30%; mild disease elsewhere     Hyperlipidemia      Hyperparathyroidism (H)      Infection due to 2019 novel coronavirus 08/2022     Kidney calculi      Migraine      Mitral regurgitation      PVC (premature ventricular contraction)      Raynaud's disease      Vitamin B12 deficiency (non anemic)      Vitamin D deficiency        PAST SURGICAL HISTORY:  Past Surgical History:   Procedure Laterality Date     CV HEART CATHETERIZATION WITH POSSIBLE INTERVENTION N/A 1/23/2019    Procedure: Coronary Angiogram with OCT and IVUS to RCA for ulcerated plaque in CTA;  Surgeon: Jeramy Carter MD;  Location:  HEART CARDIAC CATH LAB     PARATHYROIDECTOMY         FAMILY HISTORY:  Family History   Problem Relation Age of Onset     Arrhythmia Mother      Hyperlipidemia Mother      Hypertension Father      Myocardial Infarction Father 81       SOCIAL HISTORY:  Social History     Socioeconomic History     Marital status:      Spouse name: None     Number of children: None     Years of education: None     Highest education level: None   Tobacco Use     Smoking status: Never Smoker     Smokeless tobacco: Never Used   Substance and Sexual Activity     Alcohol use: Yes     Comment: rare     Drug use: No   Other Topics Concern     Parent/sibling w/ CABG, MI or angioplasty before 65F 55M? No       Review of Systems:  Skin:  not assessed     Eyes:  not assessed    ENT:   "not assessed    Respiratory:  Negative    Cardiovascular:    chest pain;palpitations;Positive for  Gastroenterology: not assessed    Genitourinary:  not assessed    Musculoskeletal:  not assessed    Neurologic:  not assessed    Psychiatric:  not assessed    Heme/Lymph/Imm:  not assessed    Endocrine:  not assessed      Physical Exam:  Vitals: /70 (BP Location: Right arm, Patient Position: Sitting, Cuff Size: Adult Large)   Pulse 70   Ht 1.854 m (6' 1\")   Wt 93 kg (205 lb)   SpO2 99%   BMI 27.05 kg/m      Constitutional:  cooperative, alert and oriented, well developed, well nourished, in no acute distress        Skin:  warm and dry to the touch, no apparent skin lesions or masses noted          Head:  normocephalic, no masses or lesions        Eyes:  pupils equal and round, conjunctivae and lids unremarkable, sclera white, no xanthalasma, EOMS intact, no nystagmus        Lymph:No Cervical lymphadenopathy present;No thyromegaly     ENT:           Neck:  carotid pulses are full and equal bilaterally, JVP normal, no carotid bruit        Respiratory:  normal breath sounds, clear to auscultation, normal A-P diameter, normal symmetry, normal respiratory excursion, no use of accessory muscles         Cardiac: regular rhythm, normal S1/S2, no S3 or S4, apical impulse not displaced, no murmurs, gallops or rubs                pulses full and equal, no bruits auscultated                                        GI:  abdomen soft, non-tender, BS normoactive, no mass, no HSM, no bruits        Extremities and Muscular Skeletal:  no deformities, clubbing, cyanosis, erythema observed;no edema              Neurological:  no gross motor deficits        Psych:  Alert and Oriented x 3      Recent Lab Results:  LIPID RESULTS:  Lab Results   Component Value Date    CHOL 139 02/07/2022    CHOL 147 05/07/2021    HDL 63 02/07/2022    HDL 56 05/07/2021    LDL 65 02/07/2022    LDL 79 05/07/2021    TRIG 54 02/07/2022    TRIG 60 " 05/07/2021       LIVER ENZYME RESULTS:  Lab Results   Component Value Date    ALT 25 02/07/2022    ALT 7 05/18/2020       CBC RESULTS:  Lab Results   Component Value Date    WBC 4.8 01/23/2019    RBC 4.95 01/23/2019    HGB 14.9 01/23/2019    HCT 43.6 01/23/2019    MCV 88 01/23/2019    MCH 30.1 01/23/2019    MCHC 34.2 01/23/2019    RDW 12.7 01/23/2019     01/23/2019       BMP RESULTS:  Lab Results   Component Value Date     05/07/2021    POTASSIUM 3.7 05/07/2021    CHLORIDE 108 05/07/2021    CO2 32 05/07/2021    ANIONGAP 1 (L) 05/07/2021    GLC 94 05/07/2021    BUN 17 05/07/2021    CR 1.11 05/07/2021    GFRESTIMATED 74 05/07/2021    GFRESTBLACK 86 05/07/2021    KELY 9.2 05/07/2021        A1C RESULTS:  No results found for: A1C    INR RESULTS:  Lab Results   Component Value Date    INR 1.04 01/23/2019           CC  ENRIKE Edwards CNP  [address]  [place] [id number]

## 2022-11-21 ENCOUNTER — HEALTH MAINTENANCE LETTER (OUTPATIENT)
Age: 57
End: 2022-11-21

## 2022-12-29 ENCOUNTER — MYC MEDICAL ADVICE (OUTPATIENT)
Dept: CARDIOLOGY | Facility: CLINIC | Age: 57
End: 2022-12-29

## 2022-12-29 DIAGNOSIS — I25.10 CAD (CORONARY ARTERY DISEASE): Primary | ICD-10-CM

## 2022-12-29 NOTE — TELEPHONE ENCOUNTER
Called pt to discuss chest heaviness and feeling winded. Pt says he has had this feeling sitting in chair , going up stairs and shoveling snow. Pt says he feels heaviness in esophagus and has eaten or drank fluid and it has temporarily resolved. Reviewed use of nitroglycerin and Pt has medication, and went over when to go to ER. Informed pt would have scheduling call him to set up appointment. RICHIE Mclaughlin RN

## 2023-01-02 ENCOUNTER — OFFICE VISIT (OUTPATIENT)
Dept: CARDIOLOGY | Facility: CLINIC | Age: 58
End: 2023-01-02
Payer: COMMERCIAL

## 2023-01-02 VITALS
HEIGHT: 73 IN | SYSTOLIC BLOOD PRESSURE: 131 MMHG | WEIGHT: 205 LBS | HEART RATE: 87 BPM | DIASTOLIC BLOOD PRESSURE: 87 MMHG | BODY MASS INDEX: 27.17 KG/M2 | OXYGEN SATURATION: 97 %

## 2023-01-02 DIAGNOSIS — I20.89 STABLE ANGINA (H): Primary | ICD-10-CM

## 2023-01-02 DIAGNOSIS — I25.10 CORONARY ARTERY DISEASE INVOLVING NATIVE CORONARY ARTERY OF NATIVE HEART WITHOUT ANGINA PECTORIS: ICD-10-CM

## 2023-01-02 PROCEDURE — 99214 OFFICE O/P EST MOD 30 MIN: CPT | Performed by: NURSE PRACTITIONER

## 2023-01-02 PROCEDURE — 93000 ELECTROCARDIOGRAM COMPLETE: CPT | Performed by: NURSE PRACTITIONER

## 2023-01-02 NOTE — PROGRESS NOTES
Cardiology Clinic Progress Note  Braulio Bunch MRN# [id number]   YOB: 1965 Age: 57 year old   Primary Cardiologist: Dr. Carter Reason for visit: H&P for angiogram             Assessment and Plan:     1.  Coronary artery disease, s/p EVELYN x1 to OM2, history of STEMI with V. fib arrest (2018)  -Coronary angiogram in 2019 showing residual trivial plaque in LAD, no significant in-stent restenosis, trivial right coronary artery plaque proximally.  -Patient with recent onset of anginal symptoms with exertion and atypical symptoms at rest  -Reviewed risks and benefits of coronary angiography and patient would like to proceed  -Advised he utilize his SL nitroglycerin and seek immediate medical attention if he should experience anginal symptoms prior to his angiogram   -Also advised he avoid exertion until after his angiogram     2.  Hyperlipidemia  -lipid panel done 7/2022 with borderline LDL control, total cholesterol 146, trig 55, LDL 82, and HDL 53  -Continue crestor and consider addition of zetia following angiogram     Patient's symptoms and history reviewed briefly with Dr. Carter and plan is to proceed with coronary angiography.    Changes today: No medication changes made    Follow up plan: Return to clinic following angiogram within to 2 weeks to review results        History of Presenting Illness:    Braulio Bunch is a very pleasant 57 year old male with a history of coronary artery disease (s/p V. fib cardiac arrest 2018), inferolateral MI, s/p EVELYN x1 to OM 2, residual mild LAD, and RCA disease, hyperlipidemia, Raynaud's disease, and hyperparathyroidism.    Patient suffered a STEMI with an inferior wall MI in 2018 with a V. fib arrest on the way to the Cath Lab, he was shocked back to sinus rhythm and had torsades directly prior to the angiogram.  He underwent EVELYN x1 into an occluded ostial OM2 branch jailing the OM1 with PTCA.  He had a 30% LAD stenosis and right coronary artery had mild  irregularities.    TTE done12/2018 showed prominent lateral, inferior, and anterior regional wall motion abnormalities.  A CTA was done and look suspicious for a possible ulcer crater/vulnerable plaque in the RCA.  Instructed to repeat coronary angiogram using OCT.  No concerning lesions were found.  He completed 1 month of DAPT.    TTE done 5/2021 showed LVEF 55 to 60%, basal to mid inferior, inferolateral/anterolateral wall hypokinesis, and mild mitral regurgitation.  Normal aortic dimensions.  Unchanged when compared to 5/2020.    Patient was most recently seen by Dr. Carter in September 2022, at which point in time he was doing quite well from a cardiac standpoint.  Plan was to repeat stress echocardiogram in 2024.    In review of telephone notes, on 12/29/2022 patient called and reported chest heaviness and shortness of breath with exertion.     Patient is here today for an acute visit for recent onset of chest pain. He had an episode of chest tightness and dyspnea following scraping ice off his driveway last week. It took 15 minutes for his symptoms to resolve and for him to feel like he could catch his breath. He did not take a nitroglycerin but did feel like he was about to pass out at one point while he was resting. Prior to this, he has been able to shovel snow and exert himself without any symptoms.     He also had an episode of chest heaviness while lying in bed on his left side, which was dull and had no associated symptoms. He has been having sharp pain on the right side intermittently as well for months.     Within the last 36 hours he says  He has been feeling better.  He is supposed to go on Sunday to move his son back from college which will entail heavy lifting and significant physical exertion.    Patient denies chest pain or chest tightness today. Denies tachycardia or palpitations. Denies orthopnea or PND. No edema or weight gains.     EKG done today and personally reviewed, showing T wave  inversion in lead III, otherwise unchanged from 2020 and no significant ST-T abnormalities.    Blood pressure 131/87 and HR 87 in clinic today.    Risks and benefits of coronary angiogram discussed today including, bleeding, bruising, infection, allergic reaction, kidney damage (including need for dialysis), stroke, heart attack, vascular damage, emergency open heart surgery, up to and including death.  Patient indicates understanding and is agreeable to proceed.        Contrast allergy: none  Anticoagulation: None  Metformin: None  Oral DM meds: None  Insulin: None  Diuretic: None  Use of phosphodiesterase type 5 inhibitor: None   Aspirin: None   Pt informed to be NPO at midnight  Renal issues: None   Pt has transportation and 24 hours post procedure monitoring set up.   Pt aware of no driving for 24 hours post procedure.         Recent Hospitalizations   None in 2022          Social History      Social History     Socioeconomic History     Marital status:      Spouse name: Not on file     Number of children: Not on file     Years of education: Not on file     Highest education level: Not on file   Occupational History     Not on file   Tobacco Use     Smoking status: Never     Smokeless tobacco: Never   Substance and Sexual Activity     Alcohol use: Yes     Comment: rare     Drug use: No     Sexual activity: Not on file   Other Topics Concern     Parent/sibling w/ CABG, MI or angioplasty before 65F 55M? No   Social History Narrative     Not on file     Social Determinants of Health     Financial Resource Strain: Not on file   Food Insecurity: Not on file   Transportation Needs: Not on file   Physical Activity: Not on file   Stress: Not on file   Social Connections: Not on file   Intimate Partner Violence: Not on file   Housing Stability: Not on file            Review of Systems:   Skin:  not assessed     Eyes:  not assessed    ENT:  not assessed    Respiratory:  Positive for dyspnea on  "exertion  Cardiovascular:    palpitations;chest pain;heaviness;Positive for;lightheadedness;dizziness  Gastroenterology: not assessed    Genitourinary:  not assessed    Musculoskeletal:  not assessed    Neurologic:  not assessed    Psychiatric:  not assessed    Heme/Lymph/Imm:  not assessed    Endocrine:  Negative           Physical Exam:   Vitals: /87 (Cuff Size: Adult Regular)   Pulse 87   Ht 1.854 m (6' 0.99\")   Wt 93 kg (205 lb)   SpO2 97%   BMI 27.05 kg/m     Wt Readings from Last 4 Encounters:   01/02/23 93 kg (205 lb)   09/14/22 93 kg (205 lb)   11/30/21 91.3 kg (201 lb 3.2 oz)   05/10/21 88.9 kg (196 lb)     GEN: well nourished, in no acute distress.  HEENT:  Pupils equal, round. Sclerae nonicteric.   NECK: Supple, no masses appreciated.   C/V:  Regular rate and rhythm, no murmur, rub or gallop.    RESP: Respirations are unlabored. Clear to auscultation bilaterally without wheezing, rales, or rhonchi.  GI: Abdomen soft, nontender.  EXTREM: no LE edema.  NEURO: Alert and oriented, cooperative.  SKIN: Warm and dry. 2+ bilateral radial pulses       Data:     LIPID RESULTS:  Lab Results   Component Value Date    CHOL 139 02/07/2022    CHOL 147 05/07/2021    HDL 63 02/07/2022    HDL 56 05/07/2021    LDL 65 02/07/2022    LDL 79 05/07/2021    TRIG 54 02/07/2022    TRIG 60 05/07/2021     LIVER ENZYME RESULTS:  Lab Results   Component Value Date    ALT 25 02/07/2022    ALT 7 05/18/2020     CBC RESULTS:  Lab Results   Component Value Date    WBC 4.8 01/23/2019    RBC 4.95 01/23/2019    HGB 14.9 01/23/2019    HCT 43.6 01/23/2019    MCV 88 01/23/2019    MCH 30.1 01/23/2019    MCHC 34.2 01/23/2019    RDW 12.7 01/23/2019     01/23/2019     BMP RESULTS:  Lab Results   Component Value Date     05/07/2021    POTASSIUM 3.7 05/07/2021    CHLORIDE 108 05/07/2021    CO2 32 05/07/2021    ANIONGAP 1 (L) 05/07/2021    GLC 94 05/07/2021    BUN 17 05/07/2021    CR 1.11 05/07/2021    GFRESTIMATED 74 05/07/2021 "    GFRESTBLACK 86 05/07/2021    KELY 9.2 05/07/2021      A1C RESULTS:  No results found for: A1C  INR RESULTS:  Lab Results   Component Value Date    INR 1.04 01/23/2019            Medications     Current Outpatient Medications   Medication Sig Dispense Refill     aspirin (ASA) 81 MG EC tablet Take 1 tablet (81 mg) by mouth daily 90 tablet 3     Cyanocobalamin 5000 MCG TBDP Take 5,000 mcg by mouth daily       nitroGLYcerin (NITROSTAT) 0.4 MG sublingual tablet For chest pain place 1 tablet under the tongue every 5 minutes for 3 doses. If symptoms persist 5 minutes after 1st dose call 911. 25 tablet 3     potassium chloride ER (KLOR-CON M) 10 MEQ CR tablet Take 1 tablet (10 mEq) by mouth daily 90 tablet 3     rosuvastatin (CRESTOR) 40 MG tablet Take 1 tablet (40 mg) by mouth daily 90 tablet 3     VITAMIN D, CHOLECALCIFEROL, PO Take 1,000 Units by mouth 2 times daily Pt takes 2000 units in the am and 1000 units in the PM            Past Medical History     Past Medical History:   Diagnosis Date     CAD (coronary artery disease)     Cath 5/10/18-lateral MI,  VF arrest,  PCI with revascularization to OM- EVELYN placed; jailing of OM-1--dilated lad 30%; mild disease elsewhere     Hyperlipidemia      Hyperparathyroidism (H)      Infection due to 2019 novel coronavirus 08/2022     Kidney calculi      Migraine      Mitral regurgitation      PVC (premature ventricular contraction)      Raynaud's disease      Vitamin B12 deficiency (non anemic)      Vitamin D deficiency      Past Surgical History:   Procedure Laterality Date     CV HEART CATHETERIZATION WITH POSSIBLE INTERVENTION N/A 1/23/2019    Procedure: Coronary Angiogram with OCT and IVUS to RCA for ulcerated plaque in CTA;  Surgeon: Jeramy Carter MD;  Location:  HEART CARDIAC CATH LAB     PARATHYROIDECTOMY       Family History   Problem Relation Age of Onset     Arrhythmia Mother      Hyperlipidemia Mother      Hypertension Father      Myocardial Infarction Father 81             Allergies   Augmentin, Coreg [carvedilol], and Metoprolol        Rola Finn NP  [facility]  Pager: [phone]

## 2023-01-02 NOTE — PATIENT INSTRUCTIONS
Today's Recommendations    I would like you to have an angiogram to recheck your heart arteries  Nothing to eat or drink after midnight the night before your test  I would like you to avoid exerting yourself until after your angiogram   If you experience any chest pain/tightness symptoms before your angiogram you need to seek immediate medical attention  Continue all medications without changes.  Please follow up with me after your angiogram within 2 weeks.    Please send a [employer] message or call [phone] to the RN team with questions or concerns.     Scheduling number [phone]  ENRIKE Ortega, CNP

## 2023-01-02 NOTE — LETTER
1/2/2023    Josesito Angel MD  [address] [place] [address]    RE: Braulio Bunch       Dear Colleague,     I had the pleasure of seeing Braulio Bunch in the [facility].  Cardiology Clinic Progress Note  Braulio Bunch MRN# [id number]   YOB: 1965 Age: 57 year old   Primary Cardiologist: Dr. Carter Reason for visit: H&P for angiogram             Assessment and Plan:     1.  Coronary artery disease, s/p EVELYN x1 to OM2, history of STEMI with V. fib arrest (2018)  -Coronary angiogram in 2019 showing residual trivial plaque in LAD, no significant in-stent restenosis, trivial right coronary artery plaque proximally.  -Patient with recent onset of anginal symptoms with exertion and atypical symptoms at rest  -Reviewed risks and benefits of coronary angiography and patient would like to proceed  -Advised he utilize his SL nitroglycerin and seek immediate medical attention if he should experience anginal symptoms prior to his angiogram   -Also advised he avoid exertion until after his angiogram     2.  Hyperlipidemia  -lipid panel done 7/2022 with borderline LDL control, total cholesterol 146, trig 55, LDL 82, and HDL 53  -Continue crestor and consider addition of zetia following angiogram     Patient's symptoms and history reviewed briefly with Dr. Carter and plan is to proceed with coronary angiography.    Changes today: No medication changes made    Follow up plan: Return to clinic following angiogram within to 2 weeks to review results        History of Presenting Illness:    Braulio Bunch is a very pleasant 57 year old male with a history of coronary artery disease (s/p V. fib cardiac arrest 2018), inferolateral MI, s/p EVELYN x1 to OM 2, residual mild LAD, and RCA disease, hyperlipidemia, Raynaud's disease, and hyperparathyroidism.    Patient suffered a STEMI with an inferior wall MI in 2018 with a V. fib arrest on the way to the Cath Lab, he was shocked back to sinus  rhythm and had torsades directly prior to the angiogram.  He underwent EVELYN x1 into an occluded ostial OM2 branch jailing the OM1 with PTCA.  He had a 30% LAD stenosis and right coronary artery had mild irregularities.    TTE done12/2018 showed prominent lateral, inferior, and anterior regional wall motion abnormalities.  A CTA was done and look suspicious for a possible ulcer crater/vulnerable plaque in the RCA.  Instructed to repeat coronary angiogram using OCT.  No concerning lesions were found.  He completed 1 month of DAPT.    TTE done 5/2021 showed LVEF 55 to 60%, basal to mid inferior, inferolateral/anterolateral wall hypokinesis, and mild mitral regurgitation.  Normal aortic dimensions.  Unchanged when compared to 5/2020.    Patient was most recently seen by Dr. Carter in September 2022, at which point in time he was doing quite well from a cardiac standpoint.  Plan was to repeat stress echocardiogram in 2024.    In review of telephone notes, on 12/29/2022 patient called and reported chest heaviness and shortness of breath with exertion.     Patient is here today for an acute visit for recent onset of chest pain. He had an episode of chest tightness and dyspnea following scraping ice off his driveway last week. It took 15 minutes for his symptoms to resolve and for him to feel like he could catch his breath. He did not take a nitroglycerin but did feel like he was about to pass out at one point while he was resting. Prior to this, he has been able to shovel snow and exert himself without any symptoms.     He also had an episode of chest heaviness while lying in bed on his left side, which was dull and had no associated symptoms. He has been having sharp pain on the right side intermittently as well for months.     Within the last 36 hours he says  He has been feeling better.  He is supposed to go on Sunday to move his son back from college which will entail heavy lifting and significant physical  exertion.    Patient denies chest pain or chest tightness today. Denies tachycardia or palpitations. Denies orthopnea or PND. No edema or weight gains.     EKG done today and personally reviewed, showing T wave inversion in lead III, otherwise unchanged from 2020 and no significant ST-T abnormalities.    Blood pressure 131/87 and HR 87 in clinic today.    Risks and benefits of coronary angiogram discussed today including, bleeding, bruising, infection, allergic reaction, kidney damage (including need for dialysis), stroke, heart attack, vascular damage, emergency open heart surgery, up to and including death.  Patient indicates understanding and is agreeable to proceed.        Contrast allergy: none  Anticoagulation: None  Metformin: None  Oral DM meds: None  Insulin: None  Diuretic: None  Use of phosphodiesterase type 5 inhibitor: None   Aspirin: None   Pt informed to be NPO at midnight  Renal issues: None   Pt has transportation and 24 hours post procedure monitoring set up.   Pt aware of no driving for 24 hours post procedure.         Recent Hospitalizations   None in 2022          Social History      Social History     Socioeconomic History     Marital status:      Spouse name: Not on file     Number of children: Not on file     Years of education: Not on file     Highest education level: Not on file   Occupational History     Not on file   Tobacco Use     Smoking status: Never     Smokeless tobacco: Never   Substance and Sexual Activity     Alcohol use: Yes     Comment: rare     Drug use: No     Sexual activity: Not on file   Other Topics Concern     Parent/sibling w/ CABG, MI or angioplasty before 65F 55M? No   Social History Narrative     Not on file     Social Determinants of Health     Financial Resource Strain: Not on file   Food Insecurity: Not on file   Transportation Needs: Not on file   Physical Activity: Not on file   Stress: Not on file   Social Connections: Not on file   Intimate Partner  "Violence: Not on file   Housing Stability: Not on file            Review of Systems:   Skin:  not assessed     Eyes:  not assessed    ENT:  not assessed    Respiratory:  Positive for dyspnea on exertion  Cardiovascular:    palpitations;chest pain;heaviness;Positive for;lightheadedness;dizziness  Gastroenterology: not assessed    Genitourinary:  not assessed    Musculoskeletal:  not assessed    Neurologic:  not assessed    Psychiatric:  not assessed    Heme/Lymph/Imm:  not assessed    Endocrine:  Negative           Physical Exam:   Vitals: /87 (Cuff Size: Adult Regular)   Pulse 87   Ht 1.854 m (6' 0.99\")   Wt 93 kg (205 lb)   SpO2 97%   BMI 27.05 kg/m     Wt Readings from Last 4 Encounters:   01/02/23 93 kg (205 lb)   09/14/22 93 kg (205 lb)   11/30/21 91.3 kg (201 lb 3.2 oz)   05/10/21 88.9 kg (196 lb)     GEN: well nourished, in no acute distress.  HEENT:  Pupils equal, round. Sclerae nonicteric.   NECK: Supple, no masses appreciated.   C/V:  Regular rate and rhythm, no murmur, rub or gallop.    RESP: Respirations are unlabored. Clear to auscultation bilaterally without wheezing, rales, or rhonchi.  GI: Abdomen soft, nontender.  EXTREM: no LE edema.  NEURO: Alert and oriented, cooperative.  SKIN: Warm and dry. 2+ bilateral radial pulses       Data:     LIPID RESULTS:  Lab Results   Component Value Date    CHOL 139 02/07/2022    CHOL 147 05/07/2021    HDL 63 02/07/2022    HDL 56 05/07/2021    LDL 65 02/07/2022    LDL 79 05/07/2021    TRIG 54 02/07/2022    TRIG 60 05/07/2021     LIVER ENZYME RESULTS:  Lab Results   Component Value Date    ALT 25 02/07/2022    ALT 7 05/18/2020     CBC RESULTS:  Lab Results   Component Value Date    WBC 4.8 01/23/2019    RBC 4.95 01/23/2019    HGB 14.9 01/23/2019    HCT 43.6 01/23/2019    MCV 88 01/23/2019    MCH 30.1 01/23/2019    MCHC 34.2 01/23/2019    RDW 12.7 01/23/2019     01/23/2019     BMP RESULTS:  Lab Results   Component Value Date     05/07/2021    " POTASSIUM 3.7 05/07/2021    CHLORIDE 108 05/07/2021    CO2 32 05/07/2021    ANIONGAP 1 (L) 05/07/2021    GLC 94 05/07/2021    BUN 17 05/07/2021    CR 1.11 05/07/2021    GFRESTIMATED 74 05/07/2021    GFRESTBLACK 86 05/07/2021    KELY 9.2 05/07/2021      A1C RESULTS:  No results found for: A1C  INR RESULTS:  Lab Results   Component Value Date    INR 1.04 01/23/2019            Medications     Current Outpatient Medications   Medication Sig Dispense Refill     aspirin (ASA) 81 MG EC tablet Take 1 tablet (81 mg) by mouth daily 90 tablet 3     Cyanocobalamin 5000 MCG TBDP Take 5,000 mcg by mouth daily       nitroGLYcerin (NITROSTAT) 0.4 MG sublingual tablet For chest pain place 1 tablet under the tongue every 5 minutes for 3 doses. If symptoms persist 5 minutes after 1st dose call 911. 25 tablet 3     potassium chloride ER (KLOR-CON M) 10 MEQ CR tablet Take 1 tablet (10 mEq) by mouth daily 90 tablet 3     rosuvastatin (CRESTOR) 40 MG tablet Take 1 tablet (40 mg) by mouth daily 90 tablet 3     VITAMIN D, CHOLECALCIFEROL, PO Take 1,000 Units by mouth 2 times daily Pt takes 2000 units in the am and 1000 units in the PM            Past Medical History     Past Medical History:   Diagnosis Date     CAD (coronary artery disease)     Cath 5/10/18-lateral MI,  VF arrest,  PCI with revascularization to OM- EVELYN placed; jailing of OM-1--dilated lad 30%; mild disease elsewhere     Hyperlipidemia      Hyperparathyroidism (H)      Infection due to 2019 novel coronavirus 08/2022     Kidney calculi      Migraine      Mitral regurgitation      PVC (premature ventricular contraction)      Raynaud's disease      Vitamin B12 deficiency (non anemic)      Vitamin D deficiency      Past Surgical History:   Procedure Laterality Date     CV HEART CATHETERIZATION WITH POSSIBLE INTERVENTION N/A 1/23/2019    Procedure: Coronary Angiogram with OCT and IVUS to RCA for ulcerated plaque in CTA;  Surgeon: Jeramy Carter MD;  Location: [facility] CARDIAC  CATH LAB     PARATHYROIDECTOMY       Family History   Problem Relation Age of Onset     Arrhythmia Mother      Hyperlipidemia Mother      Hypertension Father      Myocardial Infarction Father 81            Allergies   Augmentin, Coreg [carvedilol], and Metoprolol        Rola Finn NP  [facility]ARE  Pager: [phone]      Thank you for allowing me to participate in the care of your patient.      Sincerely,     Rola Finn NP     [facility]  cc:   Jeramy Carter MD  [address] [place] [id number]

## 2023-01-03 DIAGNOSIS — I25.10 CORONARY ARTERY DISEASE INVOLVING NATIVE CORONARY ARTERY OF NATIVE HEART WITHOUT ANGINA PECTORIS: Primary | ICD-10-CM

## 2023-01-03 RX ORDER — ASPIRIN 81 MG/1
243 TABLET, CHEWABLE ORAL ONCE
Status: CANCELLED | OUTPATIENT
Start: 2023-01-03

## 2023-01-03 RX ORDER — SODIUM CHLORIDE 9 MG/ML
INJECTION, SOLUTION INTRAVENOUS CONTINUOUS
Status: CANCELLED | OUTPATIENT
Start: 2023-01-03

## 2023-01-03 RX ORDER — ASPIRIN 325 MG
325 TABLET ORAL ONCE
Status: CANCELLED | OUTPATIENT
Start: 2023-01-03 | End: 2023-01-03

## 2023-01-03 RX ORDER — LIDOCAINE 40 MG/G
CREAM TOPICAL
Status: CANCELLED | OUTPATIENT
Start: 2023-01-03

## 2023-01-03 RX ORDER — POTASSIUM CHLORIDE 1500 MG/1
20 TABLET, EXTENDED RELEASE ORAL
Status: CANCELLED | OUTPATIENT
Start: 2023-01-03

## 2023-01-03 NOTE — PROGRESS NOTES
Coronary angiogram/PCI/Right Heart Cath prep instructions.     Patient is scheduled for a Coronary Angio w/possible PCI at [facility] - [address], [place] [address] - Main Entrance of the Hospital, on 1/5/23.  Check in time is at 630 and procedure to follow.    Patient instructed to remain NPO for solid foods 8 hours prior to arrival and may have clear liquids up to 2 hours prior to arrival.    Patient does not require extra fluids prior to procedure.    Patient is not diabetic.    Patient is not on anticoagulation.        Patient is taking ASA 81mg daily and will take 4 tabs (324mg) the morning of the procedure.    Pt is not on a SGLT2 inhibitor.    Patient advised to take their other daily medications the morning of the procedure with small sips of water.     Verified patient does not have a contrast allergy.    Verified patient has someone available to drive them home from the hospital and can stay with them for 24 hours after the procedure.     Patient advised to notify care team with any new COVID like symptoms prior to procedure.    Patient will check their temperature the morning of procedure and call [facility] at [phone] if temp is >100.0.    Patient is aware of visitor policy.    Patient expresses understanding of above instructions and denies further questions at this time.      RICHIE ochoa RN  [facility]

## 2023-01-04 ENCOUNTER — TELEPHONE (OUTPATIENT)
Dept: MEDSURG UNIT | Facility: CLINIC | Age: 58
End: 2023-01-04

## 2023-01-05 ENCOUNTER — HOSPITAL ENCOUNTER (OUTPATIENT)
Facility: CLINIC | Age: 58
Discharge: HOME | End: 2023-01-05
Admitting: INTERNAL MEDICINE
Payer: COMMERCIAL

## 2023-01-05 VITALS
TEMPERATURE: 97.9 F | OXYGEN SATURATION: 95 % | HEART RATE: 72 BPM | BODY MASS INDEX: 27.5 KG/M2 | HEIGHT: 72 IN | DIASTOLIC BLOOD PRESSURE: 81 MMHG | RESPIRATION RATE: 16 BRPM | SYSTOLIC BLOOD PRESSURE: 118 MMHG | WEIGHT: 203 LBS

## 2023-01-05 DIAGNOSIS — I25.10 CORONARY ARTERY DISEASE INVOLVING NATIVE CORONARY ARTERY OF NATIVE HEART WITHOUT ANGINA PECTORIS: ICD-10-CM

## 2023-01-05 DIAGNOSIS — I20.89 STABLE ANGINA (H): ICD-10-CM

## 2023-01-05 PROBLEM — Z98.890 STATUS POST CORONARY ANGIOGRAM: Status: ACTIVE | Noted: 2019-01-23

## 2023-01-05 LAB
ANION GAP SERPL CALCULATED.3IONS-SCNC: 7 MMOL/L (ref 3–14)
BUN SERPL-MCNC: 20 MG/DL (ref 7–30)
CALCIUM SERPL-MCNC: 9.3 MG/DL (ref 8.5–10.1)
CHLORIDE BLD-SCNC: 106 MMOL/L (ref 94–109)
CO2 SERPL-SCNC: 27 MMOL/L (ref 20–32)
CREAT SERPL-MCNC: 1.05 MG/DL (ref 0.66–1.25)
ERYTHROCYTE [DISTWIDTH] IN BLOOD BY AUTOMATED COUNT: 12 % (ref 10–15)
GFR SERPL CREATININE-BSD FRML MDRD: 83 ML/MIN/1.73M2
GLUCOSE BLD-MCNC: 94 MG/DL (ref 70–99)
HCT VFR BLD AUTO: 45.6 % (ref 40–53)
HGB BLD-MCNC: 16.1 G/DL (ref 13.3–17.7)
INR PPP: 1.06 (ref 0.85–1.15)
MCH RBC QN AUTO: 30 PG (ref 26.5–33)
MCHC RBC AUTO-ENTMCNC: 35.3 G/DL (ref 31.5–36.5)
MCV RBC AUTO: 85 FL (ref 78–100)
PLATELET # BLD AUTO: 300 10E3/UL (ref 150–450)
POTASSIUM BLD-SCNC: 3.3 MMOL/L (ref 3.4–5.3)
RBC # BLD AUTO: 5.37 10E6/UL (ref 4.4–5.9)
SODIUM SERPL-SCNC: 140 MMOL/L (ref 133–144)
WBC # BLD AUTO: 5 10E3/UL (ref 4–11)

## 2023-01-05 PROCEDURE — 250N000011 HC RX IP 250 OP 636: Performed by: INTERNAL MEDICINE

## 2023-01-05 PROCEDURE — 93010 ELECTROCARDIOGRAM REPORT: CPT | Performed by: INTERNAL MEDICINE

## 2023-01-05 PROCEDURE — 80048 BASIC METABOLIC PNL TOTAL CA: CPT | Performed by: INTERNAL MEDICINE

## 2023-01-05 PROCEDURE — 999N000184 HC STATISTIC TELEMETRY

## 2023-01-05 PROCEDURE — 999N000054 HC STATISTIC EKG NON-CHARGEABLE

## 2023-01-05 PROCEDURE — 36591 DRAW BLOOD OFF VENOUS DEVICE: CPT

## 2023-01-05 PROCEDURE — 272N000001 HC OR GENERAL SUPPLY STERILE: Performed by: INTERNAL MEDICINE

## 2023-01-05 PROCEDURE — 85610 PROTHROMBIN TIME: CPT | Performed by: INTERNAL MEDICINE

## 2023-01-05 PROCEDURE — 85014 HEMATOCRIT: CPT | Performed by: INTERNAL MEDICINE

## 2023-01-05 PROCEDURE — 250N000013 HC RX MED GY IP 250 OP 250 PS 637: Performed by: INTERNAL MEDICINE

## 2023-01-05 PROCEDURE — 250N000009 HC RX 250: Performed by: INTERNAL MEDICINE

## 2023-01-05 PROCEDURE — 93454 CORONARY ARTERY ANGIO S&I: CPT | Performed by: INTERNAL MEDICINE

## 2023-01-05 PROCEDURE — 36415 COLL VENOUS BLD VENIPUNCTURE: CPT | Performed by: INTERNAL MEDICINE

## 2023-01-05 PROCEDURE — C1769 GUIDE WIRE: HCPCS | Performed by: INTERNAL MEDICINE

## 2023-01-05 PROCEDURE — 93454 CORONARY ARTERY ANGIO S&I: CPT | Mod: 26 | Performed by: INTERNAL MEDICINE

## 2023-01-05 PROCEDURE — 99152 MOD SED SAME PHYS/QHP 5/>YRS: CPT | Mod: GC | Performed by: INTERNAL MEDICINE

## 2023-01-05 PROCEDURE — 93005 ELECTROCARDIOGRAM TRACING: CPT

## 2023-01-05 PROCEDURE — 258N000003 HC RX IP 258 OP 636: Performed by: INTERNAL MEDICINE

## 2023-01-05 PROCEDURE — C1894 INTRO/SHEATH, NON-LASER: HCPCS | Performed by: INTERNAL MEDICINE

## 2023-01-05 PROCEDURE — 99152 MOD SED SAME PHYS/QHP 5/>YRS: CPT | Performed by: INTERNAL MEDICINE

## 2023-01-05 RX ORDER — SODIUM CHLORIDE 9 MG/ML
INJECTION, SOLUTION INTRAVENOUS CONTINUOUS
Status: DISCONTINUED | OUTPATIENT
Start: 2023-01-05 | End: 2023-01-05 | Stop reason: HOSPADM

## 2023-01-05 RX ORDER — ASPIRIN 81 MG/1
243 TABLET, CHEWABLE ORAL ONCE
Status: DISCONTINUED | OUTPATIENT
Start: 2023-01-05 | End: 2023-01-05 | Stop reason: HOSPADM

## 2023-01-05 RX ORDER — VERAPAMIL HYDROCHLORIDE 2.5 MG/ML
INJECTION, SOLUTION INTRAVENOUS
Status: DISCONTINUED | OUTPATIENT
Start: 2023-01-05 | End: 2023-01-05 | Stop reason: HOSPADM

## 2023-01-05 RX ORDER — POTASSIUM CHLORIDE 1500 MG/1
20 TABLET, EXTENDED RELEASE ORAL
Status: COMPLETED | OUTPATIENT
Start: 2023-01-05 | End: 2023-01-05

## 2023-01-05 RX ORDER — FENTANYL CITRATE 50 UG/ML
INJECTION, SOLUTION INTRAMUSCULAR; INTRAVENOUS
Status: DISCONTINUED | OUTPATIENT
Start: 2023-01-05 | End: 2023-01-05 | Stop reason: HOSPADM

## 2023-01-05 RX ORDER — HEPARIN SODIUM 1000 [USP'U]/ML
INJECTION, SOLUTION INTRAVENOUS; SUBCUTANEOUS
Status: DISCONTINUED | OUTPATIENT
Start: 2023-01-05 | End: 2023-01-05 | Stop reason: HOSPADM

## 2023-01-05 RX ORDER — NALOXONE HYDROCHLORIDE 0.4 MG/ML
0.4 INJECTION, SOLUTION INTRAMUSCULAR; INTRAVENOUS; SUBCUTANEOUS
Status: DISCONTINUED | OUTPATIENT
Start: 2023-01-05 | End: 2023-01-05 | Stop reason: HOSPADM

## 2023-01-05 RX ORDER — ATROPINE SULFATE 0.1 MG/ML
0.5 INJECTION INTRAVENOUS
Status: DISCONTINUED | OUTPATIENT
Start: 2023-01-05 | End: 2023-01-05 | Stop reason: HOSPADM

## 2023-01-05 RX ORDER — OXYCODONE HYDROCHLORIDE 5 MG/1
10 TABLET ORAL EVERY 4 HOURS PRN
Status: DISCONTINUED | OUTPATIENT
Start: 2023-01-05 | End: 2023-01-05 | Stop reason: HOSPADM

## 2023-01-05 RX ORDER — FLUMAZENIL 0.1 MG/ML
0.2 INJECTION, SOLUTION INTRAVENOUS
Status: DISCONTINUED | OUTPATIENT
Start: 2023-01-05 | End: 2023-01-05 | Stop reason: HOSPADM

## 2023-01-05 RX ORDER — IOPAMIDOL 755 MG/ML
INJECTION, SOLUTION INTRAVASCULAR
Status: DISCONTINUED | OUTPATIENT
Start: 2023-01-05 | End: 2023-01-05 | Stop reason: HOSPADM

## 2023-01-05 RX ORDER — LIDOCAINE 40 MG/G
CREAM TOPICAL
Status: DISCONTINUED | OUTPATIENT
Start: 2023-01-05 | End: 2023-01-05 | Stop reason: HOSPADM

## 2023-01-05 RX ORDER — NALOXONE HYDROCHLORIDE 0.4 MG/ML
0.2 INJECTION, SOLUTION INTRAMUSCULAR; INTRAVENOUS; SUBCUTANEOUS
Status: DISCONTINUED | OUTPATIENT
Start: 2023-01-05 | End: 2023-01-05 | Stop reason: HOSPADM

## 2023-01-05 RX ORDER — FENTANYL CITRATE 50 UG/ML
25 INJECTION, SOLUTION INTRAMUSCULAR; INTRAVENOUS
Status: DISCONTINUED | OUTPATIENT
Start: 2023-01-05 | End: 2023-01-05 | Stop reason: HOSPADM

## 2023-01-05 RX ORDER — NITROGLYCERIN 5 MG/ML
VIAL (ML) INTRAVENOUS
Status: DISCONTINUED | OUTPATIENT
Start: 2023-01-05 | End: 2023-01-05 | Stop reason: HOSPADM

## 2023-01-05 RX ORDER — OXYCODONE HYDROCHLORIDE 5 MG/1
5 TABLET ORAL EVERY 4 HOURS PRN
Status: DISCONTINUED | OUTPATIENT
Start: 2023-01-05 | End: 2023-01-05 | Stop reason: HOSPADM

## 2023-01-05 RX ORDER — ACETAMINOPHEN 325 MG/1
650 TABLET ORAL EVERY 4 HOURS PRN
Status: DISCONTINUED | OUTPATIENT
Start: 2023-01-05 | End: 2023-01-05 | Stop reason: HOSPADM

## 2023-01-05 RX ORDER — ASPIRIN 325 MG
325 TABLET ORAL ONCE
Status: DISCONTINUED | OUTPATIENT
Start: 2023-01-05 | End: 2023-01-05 | Stop reason: HOSPADM

## 2023-01-05 RX ADMIN — POTASSIUM CHLORIDE 20 MEQ: 1500 TABLET, EXTENDED RELEASE ORAL at 07:43

## 2023-01-05 RX ADMIN — SODIUM CHLORIDE: 9 INJECTION, SOLUTION INTRAVENOUS at 07:02

## 2023-01-05 ASSESSMENT — ACTIVITIES OF DAILY LIVING (ADL)
ADLS_ACUITY_SCORE: 35

## 2023-01-05 NOTE — PROGRESS NOTES
Care Suites Admission Nursing Note    Patient Information  Name: Braulio Bunch  Age: 57 year old  Reason for admission: Heart cath.  Explinaed pre procedure and answered questions.  Resting on cart with call light in reach.  Care Suites arrival time: ~0640    Visitor Information  Name: ghassan Castellon  Informed of visitor restrictions: Yes  1 visitor allowed per patient   Visitor must screen negative for COVID symptoms   Visitor must wear a mask  Waiting rooms closed to visitors    Patient Admission/Assessment   Pre-procedure assessment complete: Yes  If abnormal assessment/labs, provider notified: pending  NPO: Yes  Medications held per instructions/orders: N/A  Consent: deferred  If applicable, pregnancy test status: n/a  Patient oriented to room: Yes  Education/questions answered: Yes  Plan/other: Prep for 0830 case    Discharge Planning  Discharge name/phone number: ghassan Castellon [phone]  Overnight post sedation caregiver: ghassan Castellon  Discharge location: home    Nieves Carter RN

## 2023-01-05 NOTE — PROGRESS NOTES
Care Suites Post Procedure Note    Patient Information  Name: Braulio Bunch  Age: 57 year old    Post Procedure  Time patient returned to Care Suites: 0905.  Denies c/o.  See flow sheet. Wife here  Concerns/abnormal assessment: none  If abnormal assessment, provider notified: N/A  Plan/Other: Post cares as ordered    Nieves Carter RN

## 2023-01-05 NOTE — PROGRESS NOTES
Care Suites Discharge Nursing Note    Patient Information  Name: Braulio Bunch  Age: 57 year old    Discharge Education:  Discharge instructions reviewed: Yes  Patient/patient representative verbalizes understanding: Yes  Patient discharging on new medications: No  Medication education completed: N/A    Discharge Plans:   Discharge location: home  Discharge ride contacted: Yes  Approximate discharge time: 1230    Discharge Criteria:  Discharge criteria met and vital signs stable: Yes    Patient Belongs:  Patient belongings returned to patient: Yes    Rebecca Pruitt RN

## 2023-01-05 NOTE — DISCHARGE INSTRUCTIONS
Cardiac Angiogram Discharge Instructions - Wrist    After you go home:    Have an adult stay with you until tomorrow.  Drink extra fluids for 2 days.  You may resume your normal diet.  No smoking       For 24 hours - due to the sedation you received:  Relax and take it easy.  Do NOT make any important or legal decisions.  Do NOT drive or operate machines at home or at work.  Do NOT drink alcohol.    Care of Wrist Puncture Site:    For the first 24 hrs - check the puncture site every 1-2 hours while awake.  It is normal to have soreness at the puncture site and mild tingling in your hand for up to 3 days.  Remove the bandaid after 24 hours. If there is minor oozing, apply another bandaid and remove it after 12 hours.  You may shower tomorrow.  Do NOT take a bath, or use a hot tub or pool for at least 3 days. Do NOT scrub the site. Do not use lotion or powder near the puncture site.           Activity:        For 2 days:   do not use your hand or arm to support your weight (such as rising from a chair)   do not bend your wrist (such as lifting a garage door).  do not lift more than 5 pounds or exercise your arm (such as tennis, golf or bowling).  Do NOT do any heavy activity such as exercise, lifting, or straining.     Bleeding:    If you start bleeding from the site in your wrist, sit down and press firmly on/above the site for 10 minutes.   Once bleeding stops, keep arm still for 2 hours.   Call [facility] as soon as you can.       Call 911 right away if you have heavy bleeding or bleeding that does not stop.      Medicines:  Take your medications, including blood thinners, unless your provider tells you not to.    If you take Coumadin (Warfarin), have your INR checked by your provider in  3-5 days. Call your clinic to schedule this.  If you have stopped any medicines, check with your provider about when to restart them.    Follow Up Appointments:    Follow up with [facility] Nurse Practitioner at [facility] of  patient preference in 7-10 days.    Call the clinic if:    You have a large or growing hard lump around the site.  The site is red, swollen, hot or tender.  Blood or fluid is draining from the site.  You have chills or a fever greater than 101 F (38 C).  Your arm feels numb, cool or changes color.  You have hives, a rash or unusual itching.  Any questions or concerns.          Formerly [facility] [place]:    [phone] [facility]MP (7 days a week)

## 2023-01-07 LAB
ATRIAL RATE - MUSE: 59 BPM
DIASTOLIC BLOOD PRESSURE - MUSE: NORMAL MMHG
INTERPRETATION ECG - MUSE: NORMAL
P AXIS - MUSE: 70 DEGREES
PR INTERVAL - MUSE: 144 MS
QRS DURATION - MUSE: 100 MS
QT - MUSE: 416 MS
QTC - MUSE: 411 MS
R AXIS - MUSE: 49 DEGREES
SYSTOLIC BLOOD PRESSURE - MUSE: NORMAL MMHG
T AXIS - MUSE: 49 DEGREES
VENTRICULAR RATE- MUSE: 59 BPM

## 2023-01-12 NOTE — PROGRESS NOTES
Cardiology Clinic Progress Note  Braulio Bunch MRN# [id number]   YOB: 1965 Age: 57 year old   Primary Cardiologist: Dr. Carter Reason for visit: Follow up after angiogram             Assessment and Plan:     1.  Coronary artery disease, s/p EVELYN x1 to OM2, history of STEMI with V. fib arrest (2018)  -Coronary angiogram in 2019 showing residual trivial plaque in LAD, no significant in-stent restenosis, trivial right coronary artery plaque proximally.  -Patient with recent onset of anginal symptoms with exertion and atypical symptoms at rest  -1/5/2023 coronary angiogram showed stable nonobstructive coronary disease, prior OM stent was widely patent.  -Continue aspirin, statin    2.  Hyperlipidemia  -lipid panel done 7/2022 with borderline LDL control, total cholesterol 146, trig 55, LDL 82, and HDL 53  -Patient to focus on mediterranian diet and increasing exercise  -Continue crestor, consider addition of zetia pending repeat lipids in July 2023     3. Palpitations with near syncope, family history of arrythmias  -History of near syncope and 2020 with a negative Holter monitor  -Recent near syncope, will have patient wear a 2-week event monitor to assess for arrhythmias or ectopy  -Recheck potassium and magnesium levels today      Changes today: No medication changes made    Follow up plan: Patient to follow-up in 4 to 6 weeks following completion of his event monitor        History of Presenting Illness:    Braulio Bunch is a very pleasant 57 year old male with a history of coronary artery disease (s/p V. fib cardiac arrest 2018), inferolateral MI, s/p EVELYN x1 to OM 2, residual mild LAD, and RCA disease, hyperlipidemia, Raynaud's disease, and hyperparathyroidism.    Patient's mother had an ablation for a tachycardic heart rhythm, unclear what rhythm.     Patient suffered a STEMI with an inferior wall MI in 2018 with a V. fib arrest on the way to the Cath Lab, he was shocked back to sinus rhythm and  had torsades directly prior to the angiogram.  He underwent EVELYN x1 into an occluded ostial OM2 branch jailing the OM1 with PTCA.  He had a 30% LAD stenosis and right coronary artery had mild irregularities.    TTE done 12/2018 showed prominent lateral, inferior, and anterior regional wall motion abnormalities.  A CTA was done and look suspicious for a possible ulcer crater/vulnerable plaque in the RCA.  A repeat coronary angiogram was completed 1/2019 using OCT.  No concerning lesions were found. He completed 1 year of DAPT.    TTE done 5/2021 showed LVEF 55 to 60%, basal to mid inferior, inferolateral/anterolateral wall hypokinesis, and mild mitral regurgitation.  Normal aortic dimensions.  Unchanged when compared to 5/2020.    Patient was most recently seen by Dr. Carter in September 2022, at which point in time he was doing well from a cardiac standpoint.  Plan was to repeat stress echocardiogram in 2024.    In review of telephone notes, on 12/29/2022 patient called and reported chest heaviness and shortness of breath with exertion.     I saw patient on 1/2/23 for an acute visit secondary to episodes of chest tightness with exertion and at rest. I reviewed his symptoms and history with his primary cardiologist, Dr. Carter, who recommended coronary angiography.     Coronary angiogram was done 1/5/2023 showing a mid RCA lesion 35% stenosed, second diagonal lesion 40% stenosed, mid LAD lesion 20% stenosed, considered stable, nonobstructive coronary disease, and his prior OM stent was widely patent.    Patient is here today to follow up after his angiogram. He had an episode the day following his angiogram where he felt palpitations and a fluttering sensation in his mid sternum and he became weak, and near fainted, almost losing consciousness while walking in [place].  He had a similar episode in 2020 in his kitchen, where his knees suddenly became weak and he fell to the floor of his kitchen and a holter monitor  was done which showed <1% PVCs (isolated) and <1% SVT. Average HR at that time was 54 bpm and metoprolol was stopped.       He continues to have chest tightness intermittently, the same tightness he was experiencing prior to his recent angiogram. Patient denies chest pain. Denies orthopnea or PND. No edema or weight gains. Shortness of breath with exertion gradually improving.     Left radial site healed. Fading yellowed bruising and non-tender on lower arm.     Blood pressure 118/76 and HR 68 in clinic today.    Labs reviewed from 1/5/23 prior to his angiogram sodium 140, potassium 3.3, BUN 20, creatinine 1.05, GFR 83, Hemoglobin 16.1, platelets 300.        Recent Hospitalizations   None in 2022          Social History      Social History     Socioeconomic History     Marital status:      Spouse name: Not on file     Number of children: Not on file     Years of education: Not on file     Highest education level: Not on file   Occupational History     Not on file   Tobacco Use     Smoking status: Never     Smokeless tobacco: Never   Substance and Sexual Activity     Alcohol use: Yes     Comment: rare     Drug use: No     Sexual activity: Not on file   Other Topics Concern     Parent/sibling w/ CABG, MI or angioplasty before 65F 55M? No   Social History Narrative     Not on file     Social Determinants of Health     Financial Resource Strain: Not on file   Food Insecurity: Not on file   Transportation Needs: Not on file   Physical Activity: Not on file   Stress: Not on file   Social Connections: Not on file   Intimate Partner Violence: Not on file   Housing Stability: Not on file            Review of Systems:   Skin:        Eyes:       ENT:       Respiratory:  Positive for cough;shortness of breath  Cardiovascular:    Positive for;chest pain;palpitations;syncope or near-syncope  Gastroenterology:      Genitourinary:       Musculoskeletal:       Neurologic:       Psychiatric:       Heme/Lymph/Imm:  Positive for  "allergies  Endocrine:  Negative           Physical Exam:   Vitals: /76   Pulse 68   Ht 1.854 m (6' 1\")   Wt 94 kg (207 lb 4.8 oz)   BMI 27.35 kg/m     Wt Readings from Last 4 Encounters:   01/13/23 94 kg (207 lb 4.8 oz)   01/05/23 92.1 kg (203 lb)   01/02/23 93 kg (205 lb)   09/14/22 93 kg (205 lb)     GEN: well nourished, in no acute distress.  HEENT:  Pupils equal, round. Sclerae nonicteric.   NECK: Supple, no masses appreciated.   C/V:  Regular rate and rhythm, no murmur, rub or gallop.    RESP: Respirations are unlabored. Clear to auscultation bilaterally without wheezing, rales, or rhonchi.  GI: Abdomen soft, nontender.  EXTREM: no LE edema.  NEURO: Alert and oriented, cooperative.  SKIN: Warm and dry. Left radial puncture site +CMS, no bruit       Data:     LIPID RESULTS:  Lab Results   Component Value Date    CHOL 139 02/07/2022    CHOL 147 05/07/2021    HDL 63 02/07/2022    HDL 56 05/07/2021    LDL 65 02/07/2022    LDL 79 05/07/2021    TRIG 54 02/07/2022    TRIG 60 05/07/2021     LIVER ENZYME RESULTS:  Lab Results   Component Value Date    ALT 25 02/07/2022    ALT 7 05/18/2020     CBC RESULTS:  Lab Results   Component Value Date    WBC 5.0 01/05/2023    WBC 4.8 01/23/2019    RBC 5.37 01/05/2023    RBC 4.95 01/23/2019    HGB 16.1 01/05/2023    HGB 14.9 01/23/2019    HCT 45.6 01/05/2023    HCT 43.6 01/23/2019    MCV 85 01/05/2023    MCV 88 01/23/2019    MCH 30.0 01/05/2023    MCH 30.1 01/23/2019    MCHC 35.3 01/05/2023    MCHC 34.2 01/23/2019    RDW 12.0 01/05/2023    RDW 12.7 01/23/2019     01/05/2023     01/23/2019     BMP RESULTS:  Lab Results   Component Value Date     01/05/2023     05/07/2021    POTASSIUM 3.3 (L) 01/05/2023    POTASSIUM 3.7 05/07/2021    CHLORIDE 106 01/05/2023    CHLORIDE 108 05/07/2021    CO2 27 01/05/2023    CO2 32 05/07/2021    ANIONGAP 7 01/05/2023    ANIONGAP 1 (L) 05/07/2021    GLC 94 01/05/2023    GLC 94 05/07/2021    BUN 20 01/05/2023    BUN " 17 05/07/2021    CR 1.05 01/05/2023    CR 1.11 05/07/2021    GFRESTIMATED 83 01/05/2023    GFRESTIMATED 74 05/07/2021    GFRESTBLACK 86 05/07/2021    KELY 9.3 01/05/2023    KELY 9.2 05/07/2021      A1C RESULTS:  No results found for: A1C  INR RESULTS:  Lab Results   Component Value Date    INR 1.06 01/05/2023    INR 1.04 01/23/2019            Medications     Current Outpatient Medications   Medication Sig Dispense Refill     aspirin (ASA) 81 MG EC tablet Take 1 tablet (81 mg) by mouth daily 90 tablet 3     Cyanocobalamin 5000 MCG TBDP Take 5,000 mcg by mouth daily       nitroGLYcerin (NITROSTAT) 0.4 MG sublingual tablet For chest pain place 1 tablet under the tongue every 5 minutes for 3 doses. If symptoms persist 5 minutes after 1st dose call 911. 25 tablet 3     potassium chloride ER (KLOR-CON M) 10 MEQ CR tablet Take 1 tablet (10 mEq) by mouth daily 90 tablet 3     rosuvastatin (CRESTOR) 40 MG tablet Take 1 tablet (40 mg) by mouth daily 90 tablet 3     VITAMIN D, CHOLECALCIFEROL, PO Take 1,000 Units by mouth 2 times daily Pt takes 2000 units in the am and 1000 units in the PM            Past Medical History     Past Medical History:   Diagnosis Date     CAD (coronary artery disease)     Cath 5/10/18-lateral MI,  VF arrest,  PCI with revascularization to OM- EVELYN placed; jailing of OM-1--dilated lad 30%; mild disease elsewhere     Hyperlipidemia      Hyperparathyroidism (H)      Infection due to 2019 novel coronavirus 08/2022     Kidney calculi      Migraine      Mitral regurgitation      PVC (premature ventricular contraction)      Raynaud's disease      Vitamin B12 deficiency (non anemic)      Vitamin D deficiency      Past Surgical History:   Procedure Laterality Date     CV CORONARY ANGIOGRAM N/A 1/5/2023    Procedure: Coronary Angiogram;  Surgeon: Kely Parnell MD;  Location: [facility] CARDI[facility]     CV HEART CATHETERIZATION WITH POSSIBLE INTERVENTION N/A 1/23/2019    Procedure: Coronary Angiogram  with OCT and IVUS to RCA for ulcerated plaque in CTA;  Surgeon: Jeramy Carter MD;  Location:  HEART CARDIAC CATH LAB     PARATHYROIDECTOMY       Family History   Problem Relation Age of Onset     Arrhythmia Mother      Hyperlipidemia Mother      Hypertension Father      Myocardial Infarction Father 81            Allergies   Augmentin, Coreg [carvedilol], and Metoprolol        Rola Finn NP  [facility]  Pager: [phone]

## 2023-01-13 ENCOUNTER — OFFICE VISIT (OUTPATIENT)
Dept: CARDIOLOGY | Facility: CLINIC | Age: 58
End: 2023-01-13
Attending: NURSE PRACTITIONER
Payer: COMMERCIAL

## 2023-01-13 ENCOUNTER — LAB (OUTPATIENT)
Dept: LAB | Facility: CLINIC | Age: 58
End: 2023-01-13
Payer: COMMERCIAL

## 2023-01-13 VITALS
HEART RATE: 68 BPM | DIASTOLIC BLOOD PRESSURE: 76 MMHG | BODY MASS INDEX: 27.47 KG/M2 | SYSTOLIC BLOOD PRESSURE: 118 MMHG | WEIGHT: 207.3 LBS | HEIGHT: 73 IN

## 2023-01-13 DIAGNOSIS — I25.10 CORONARY ARTERY DISEASE INVOLVING NATIVE CORONARY ARTERY OF NATIVE HEART WITHOUT ANGINA PECTORIS: ICD-10-CM

## 2023-01-13 DIAGNOSIS — R55 NEAR SYNCOPE: ICD-10-CM

## 2023-01-13 DIAGNOSIS — R55 NEAR SYNCOPE: Primary | ICD-10-CM

## 2023-01-13 DIAGNOSIS — I20.89 STABLE ANGINA (H): ICD-10-CM

## 2023-01-13 LAB
MAGNESIUM SERPL-MCNC: 2.2 MG/DL (ref 1.6–2.3)
POTASSIUM BLD-SCNC: 4.1 MMOL/L (ref 3.4–5.3)

## 2023-01-13 PROCEDURE — 99214 OFFICE O/P EST MOD 30 MIN: CPT | Performed by: NURSE PRACTITIONER

## 2023-01-13 PROCEDURE — 36415 COLL VENOUS BLD VENIPUNCTURE: CPT | Performed by: NURSE PRACTITIONER

## 2023-01-13 PROCEDURE — 83735 ASSAY OF MAGNESIUM: CPT | Performed by: NURSE PRACTITIONER

## 2023-01-13 PROCEDURE — 84132 ASSAY OF SERUM POTASSIUM: CPT | Performed by: NURSE PRACTITIONER

## 2023-01-13 NOTE — PATIENT INSTRUCTIONS
Today's Recommendations    We will recheck your potassium and magnesium today  I would like you to wear an event monitor for 2 weeks  Continue all medications without changes.  Please follow up with me in 6 weeks.    Please send a [employer] message or call [phone] to the RN team with questions or concerns.     Scheduling number [phone]  ENRIKE Ortega, CNP

## 2023-01-13 NOTE — LETTER
1/13/2023    Josesito Angel MD  [address] [place] [address]    RE: Braulio Bunch       Dear Colleague,     I had the pleasure of seeing Braulio Bunch in the [facility].  Cardiology Clinic Progress Note  Braulio Bunch MRN# [id number]   YOB: 1965 Age: 57 year old   Primary Cardiologist: Dr. Carter Reason for visit: Follow up after angiogram             Assessment and Plan:     1.  Coronary artery disease, s/p EVELYN x1 to OM2, history of STEMI with V. fib arrest (2018)  -Coronary angiogram in 2019 showing residual trivial plaque in LAD, no significant in-stent restenosis, trivial right coronary artery plaque proximally.  -Patient with recent onset of anginal symptoms with exertion and atypical symptoms at rest  -1/5/2023 coronary angiogram showed stable nonobstructive coronary disease, prior OM stent was widely patent.  -Continue aspirin, statin    2.  Hyperlipidemia  -lipid panel done 7/2022 with borderline LDL control, total cholesterol 146, trig 55, LDL 82, and HDL 53  -Patient to focus on mediterranian diet and increasing exercise  -Continue crestor, consider addition of zetia pending repeat lipids in July 2023     3. Palpitations with near syncope, family history of arrythmias  -History of near syncope and 2020 with a negative Holter monitor  -Recent near syncope, will have patient wear a 2-week event monitor to assess for arrhythmias or ectopy  -Recheck potassium and magnesium levels today      Changes today: No medication changes made    Follow up plan: Patient to follow-up in 4 to 6 weeks following completion of his event monitor        History of Presenting Illness:    Braulio Bunch is a very pleasant 57 year old male with a history of coronary artery disease (s/p V. fib cardiac arrest 2018), inferolateral MI, s/p EVELYN x1 to OM 2, residual mild LAD, and RCA disease, hyperlipidemia, Raynaud's disease, and hyperparathyroidism.    Patient's mother had an  ablation for a tachycardic heart rhythm, unclear what rhythm.     Patient suffered a STEMI with an inferior wall MI in 2018 with a V. fib arrest on the way to the Cath Lab, he was shocked back to sinus rhythm and had torsades directly prior to the angiogram.  He underwent EVELYN x1 into an occluded ostial OM2 branch jailing the OM1 with PTCA.  He had a 30% LAD stenosis and right coronary artery had mild irregularities.    TTE done 12/2018 showed prominent lateral, inferior, and anterior regional wall motion abnormalities.  A CTA was done and look suspicious for a possible ulcer crater/vulnerable plaque in the RCA.  A repeat coronary angiogram was completed 1/2019 using OCT.  No concerning lesions were found. He completed 1 year of DAPT.    TTE done 5/2021 showed LVEF 55 to 60%, basal to mid inferior, inferolateral/anterolateral wall hypokinesis, and mild mitral regurgitation.  Normal aortic dimensions.  Unchanged when compared to 5/2020.    Patient was most recently seen by Dr. Carter in September 2022, at which point in time he was doing well from a cardiac standpoint.  Plan was to repeat stress echocardiogram in 2024.    In review of telephone notes, on 12/29/2022 patient called and reported chest heaviness and shortness of breath with exertion.     I saw patient on 1/2/23 for an acute visit secondary to episodes of chest tightness with exertion and at rest. I reviewed his symptoms and history with his primary cardiologist, Dr. Carter, who recommended coronary angiography.     Coronary angiogram was done 1/5/2023 showing a mid RCA lesion 35% stenosed, second diagonal lesion 40% stenosed, mid LAD lesion 20% stenosed, considered stable, nonobstructive coronary disease, and his prior OM stent was widely patent.    Patient is here today to follow up after his angiogram. He had an episode the day following his angiogram where he felt palpitations and a fluttering sensation in his mid sternum and he became weak, and near  fainted, almost losing consciousness while walking in [employer].  He had a similar episode in 2020 in his kitchen, where his knees suddenly became weak and he fell to the floor of his kitchen and a holter monitor was done which showed <1% PVCs (isolated) and <1% SVT. Average HR at that time was 54 bpm and metoprolol was stopped.       He continues to have chest tightness intermittently, the same tightness he was experiencing prior to his recent angiogram. Patient denies chest pain. Denies orthopnea or PND. No edema or weight gains. Shortness of breath with exertion gradually improving.     Left radial site healed. Fading yellowed bruising and non-tender on lower arm.     Blood pressure 118/76 and HR 68 in clinic today.    Labs reviewed from 1/5/23 prior to his angiogram sodium 140, potassium 3.3, BUN 20, creatinine 1.05, GFR 83, Hemoglobin 16.1, platelets 300.        Recent Hospitalizations   None in 2022          Social History      Social History     Socioeconomic History     Marital status:      Spouse name: Not on file     Number of children: Not on file     Years of education: Not on file     Highest education level: Not on file   Occupational History     Not on file   Tobacco Use     Smoking status: Never     Smokeless tobacco: Never   Substance and Sexual Activity     Alcohol use: Yes     Comment: rare     Drug use: No     Sexual activity: Not on file   Other Topics Concern     Parent/sibling w/ CABG, MI or angioplasty before 65F 55M? No   Social History Narrative     Not on file     Social Determinants of Health     Financial Resource Strain: Not on file   Food Insecurity: Not on file   Transportation Needs: Not on file   Physical Activity: Not on file   Stress: Not on file   Social Connections: Not on file   Intimate Partner Violence: Not on file   Housing Stability: Not on file            Review of Systems:   Skin:        Eyes:       ENT:       Respiratory:  Positive for cough;shortness of  "breath  Cardiovascular:    Positive for;chest pain;palpitations;syncope or near-syncope  Gastroenterology:      Genitourinary:       Musculoskeletal:       Neurologic:       Psychiatric:       Heme/Lymph/Imm:  Positive for allergies  Endocrine:  Negative           Physical Exam:   Vitals: /76   Pulse 68   Ht 1.854 m (6' 1\")   Wt 94 kg (207 lb 4.8 oz)   BMI 27.35 kg/m     Wt Readings from Last 4 Encounters:   01/13/23 94 kg (207 lb 4.8 oz)   01/05/23 92.1 kg (203 lb)   01/02/23 93 kg (205 lb)   09/14/22 93 kg (205 lb)     GEN: well nourished, in no acute distress.  HEENT:  Pupils equal, round. Sclerae nonicteric.   NECK: Supple, no masses appreciated.   C/V:  Regular rate and rhythm, no murmur, rub or gallop.    RESP: Respirations are unlabored. Clear to auscultation bilaterally without wheezing, rales, or rhonchi.  GI: Abdomen soft, nontender.  EXTREM: no LE edema.  NEURO: Alert and oriented, cooperative.  SKIN: Warm and dry. Left radial puncture site +CMS, no bruit       Data:     LIPID RESULTS:  Lab Results   Component Value Date    CHOL 139 02/07/2022    CHOL 147 05/07/2021    HDL 63 02/07/2022    HDL 56 05/07/2021    LDL 65 02/07/2022    LDL 79 05/07/2021    TRIG 54 02/07/2022    TRIG 60 05/07/2021     LIVER ENZYME RESULTS:  Lab Results   Component Value Date    ALT 25 02/07/2022    ALT 7 05/18/2020     CBC RESULTS:  Lab Results   Component Value Date    WBC 5.0 01/05/2023    WBC 4.8 01/23/2019    RBC 5.37 01/05/2023    RBC 4.95 01/23/2019    HGB 16.1 01/05/2023    HGB 14.9 01/23/2019    HCT 45.6 01/05/2023    HCT 43.6 01/23/2019    MCV 85 01/05/2023    MCV 88 01/23/2019    MCH 30.0 01/05/2023    MCH 30.1 01/23/2019    MCHC 35.3 01/05/2023    MCHC 34.2 01/23/2019    RDW 12.0 01/05/2023    RDW 12.7 01/23/2019     01/05/2023     01/23/2019     BMP RESULTS:  Lab Results   Component Value Date     01/05/2023     05/07/2021    POTASSIUM 3.3 (L) 01/05/2023    POTASSIUM 3.7 " 05/07/2021    CHLORIDE 106 01/05/2023    CHLORIDE 108 05/07/2021    CO2 27 01/05/2023    CO2 32 05/07/2021    ANIONGAP 7 01/05/2023    ANIONGAP 1 (L) 05/07/2021    GLC 94 01/05/2023    GLC 94 05/07/2021    BUN 20 01/05/2023    BUN 17 05/07/2021    CR 1.05 01/05/2023    CR 1.11 05/07/2021    GFRESTIMATED 83 01/05/2023    GFRESTIMATED 74 05/07/2021    GFRESTBLACK 86 05/07/2021    KELY 9.3 01/05/2023    KELY 9.2 05/07/2021      A1C RESULTS:  No results found for: A1C  INR RESULTS:  Lab Results   Component Value Date    INR 1.06 01/05/2023    INR 1.04 01/23/2019            Medications     Current Outpatient Medications   Medication Sig Dispense Refill     aspirin (ASA) 81 MG EC tablet Take 1 tablet (81 mg) by mouth daily 90 tablet 3     Cyanocobalamin 5000 MCG TBDP Take 5,000 mcg by mouth daily       nitroGLYcerin (NITROSTAT) 0.4 MG sublingual tablet For chest pain place 1 tablet under the tongue every 5 minutes for 3 doses. If symptoms persist 5 minutes after 1st dose call 911. 25 tablet 3     potassium chloride ER (KLOR-CON M) 10 MEQ CR tablet Take 1 tablet (10 mEq) by mouth daily 90 tablet 3     rosuvastatin (CRESTOR) 40 MG tablet Take 1 tablet (40 mg) by mouth daily 90 tablet 3     VITAMIN D, CHOLECALCIFEROL, PO Take 1,000 Units by mouth 2 times daily Pt takes 2000 units in the am and 1000 units in the PM            Past Medical History     Past Medical History:   Diagnosis Date     CAD (coronary artery disease)     Cath 5/10/18-lateral MI,  VF arrest,  PCI with revascularization to OM- EVELYN placed; jailing of OM-1--dilated lad 30%; mild disease elsewhere     Hyperlipidemia      Hyperparathyroidism (H)      Infection due to 2019 novel coronavirus 08/2022     Kidney calculi      Migraine      Mitral regurgitation      PVC (premature ventricular contraction)      Raynaud's disease      Vitamin B12 deficiency (non anemic)      Vitamin D deficiency      Past Surgical History:   Procedure Laterality Date     CV CORONARY  ANGIOGRAM N/A 1/5/2023    Procedure: Coronary Angiogram;  Surgeon: Kely Parnell MD;  Location:  [facility] CARDI[facility] CATH LAB     CV HEART CATHETERIZATION WITH POSSIBLE INTERVENTION N/A 1/23/2019    Procedure: Coronary Angiogram with OCT and IVUS to RCA for ulcerated plaque in CTA;  Surgeon: Jeramy Carter MD;  Location: [facility] CARDIAC CATH LAB     PARATHYROIDECTOMY       Family History   Problem Relation Age of Onset     Arrhythmia Mother      Hyperlipidemia Mother      Hypertension Father      Myocardial Infarction Father 81            Allergies   Augmentin, Coreg [carvedilol], and Metoprolol        Rola Finn NP  [facility]  Pager: [phone]    Thank you for allowing me to participate in the care of your patient.      Sincerely,     Rola Finn NP     [facility]  cc:   Rola Finn NP  [address]  [place] [address]

## 2023-01-24 ENCOUNTER — HOSPITAL ENCOUNTER (OUTPATIENT)
Dept: CARDIOLOGY | Facility: CLINIC | Age: 58
Discharge: HOME | End: 2023-01-24
Attending: NURSE PRACTITIONER | Admitting: NURSE PRACTITIONER
Payer: COMMERCIAL

## 2023-01-24 DIAGNOSIS — R55 NEAR SYNCOPE: ICD-10-CM

## 2023-01-24 PROCEDURE — 93270 REMOTE 30 DAY ECG REV/REPORT: CPT

## 2023-01-24 PROCEDURE — 93272 ECG/REVIEW INTERPRET ONLY: CPT | Performed by: INTERNAL MEDICINE

## 2023-03-02 ENCOUNTER — OFFICE VISIT (OUTPATIENT)
Dept: CARDIOLOGY | Facility: CLINIC | Age: 58
End: 2023-03-02
Payer: COMMERCIAL

## 2023-03-02 VITALS
SYSTOLIC BLOOD PRESSURE: 125 MMHG | HEIGHT: 72 IN | WEIGHT: 206.7 LBS | BODY MASS INDEX: 28 KG/M2 | HEART RATE: 96 BPM | DIASTOLIC BLOOD PRESSURE: 81 MMHG

## 2023-03-02 DIAGNOSIS — R55 NEAR SYNCOPE: ICD-10-CM

## 2023-03-02 DIAGNOSIS — I25.10 CORONARY ARTERY DISEASE INVOLVING NATIVE CORONARY ARTERY OF NATIVE HEART WITHOUT ANGINA PECTORIS: ICD-10-CM

## 2023-03-02 PROCEDURE — 99214 OFFICE O/P EST MOD 30 MIN: CPT | Performed by: NURSE PRACTITIONER

## 2023-03-02 NOTE — LETTER
3/2/2023    Josesito Angel MD  [address] [place] [address]    RE: Braulio JORDAN Alivia       Dear Colleague,     I had the pleasure of seeing Braulio Bunch in the [facility].  Cardiology Clinic Progress Note  Braulio Bunch MRN# [id number]   YOB: 1965 Age: 57 year old   Primary Cardiologist: Dr. Carter Reason for visit: Follow up after angiogram             Assessment and Plan:     1.  Coronary artery disease, s/p EVELYN x1 to OM2, history of STEMI with V. fib arrest (2018)  -Coronary angiogram in 2019 showing residual trivial plaque in LAD, no significant in-stent restenosis, trivial right coronary artery plaque proximally.  -1/5/2023 coronary angiogram showed stable nonobstructive coronary disease, prior OM stent was widely patent  -Continue aspirin, statin  -No further chest pain or chest tightness    2.  Hyperlipidemia  -lipid panel done 7/2022 with borderline LDL control, total cholesterol 146, trig 55, LDL 82, and HDL 53  -Patient to continue to focus on mediterranian diet and increasing exercise, I praised him for his efforts in this regard today  -Patient have repeat lipid panel drawn within the next month    3. Palpitations with near syncope, family history of arrythmias  -History of near syncope and 2020 with a negative Holter monitor  -Potassium and magnesium levels normal  -Near syncope in January 2023, event monitor did not show any concerning arrhythmias, occasional PVCs and couplets sinus rhythm during triggered patient events    Considering patient's unremarkable event monitor, resolution of symptoms with increased exercise and lifestyle modifications, and recent coronary angiogram showing stable nonobstructive coronary disease with patent prior OM stent I think is reasonable for him to follow-up with the original timeline with Dr. Carter in 2024.    Changes today: No medication changes made    Follow up plan: Patient to follow-up in 18 months with   Raul with a stress echocardiogram prior        History of Presenting Illness:    Braulio Bunch is a very pleasant 57 year old male with a history of coronary artery disease (s/p V. fib cardiac arrest 2018), inferolateral MI, s/p EVELYN x1 to OM 2, residual mild LAD, and RCA disease, hyperlipidemia, Raynaud's disease, and hyperparathyroidism.    Patient's mother had an ablation for a tachycardic heart rhythm, unclear what rhythm.     Patient suffered a STEMI with an inferior wall MI in 2018 with a V. fib arrest on the way to the Cath Lab, he was shocked back to sinus rhythm and had torsades directly prior to the angiogram.  He underwent EVELYN x1 into an occluded ostial OM2 branch jailing the OM1 with PTCA.  He had a 30% LAD stenosis and right coronary artery had mild irregularities.    TTE done 12/2018 showed prominent lateral, inferior, and anterior regional wall motion abnormalities.  A CTA was done and look suspicious for a possible ulcer crater/vulnerable plaque in the RCA.  A repeat coronary angiogram was completed 1/2019 using OCT.  No concerning lesions were found. He completed 1 year of DAPT.    TTE done 5/2021 showed LVEF 55 to 60%, basal to mid inferior, inferolateral/anterolateral wall hypokinesis, and mild mitral regurgitation.  Normal aortic dimensions.  Unchanged when compared to 5/2020.    Patient was most recently seen by Dr. Carter in September 2022, at which point in time he was doing well from a cardiac standpoint.  Plan was to repeat stress echocardiogram in 2024.    On 12/29/2022 patient called and reported chest heaviness and shortness of breath with exertion.     I saw patient on 1/2/23 for an acute visit secondary to episodes of chest tightness with exertion and at rest. I reviewed his symptoms and history with his primary cardiologist, Dr. Carter, who recommended coronary angiography.     Coronary angiogram was done 1/5/2023 showing a mid RCA lesion 35% stenosed, second diagonal lesion 40%  stenosed, mid LAD lesion 20% stenosed, considered stable, nonobstructive coronary disease, and his prior OM stent was widely patent.    In clinic following his angiogram, he shared that he had an episode the day following his angiogram where he felt palpitations and a fluttering sensation in his mid sternum and he became weak, and near fainted, almost losing consciousness while walking in [employer].  He had a similar episode in 2020 in his kitchen, where his knees suddenly became weak and he fell to the floor of his kitchen and a holter monitor was done which showed <1% PVCs (isolated) and <1% SVT. Average HR at that time was 54 bpm and metoprolol was stopped.  I had him wear an event monitor for 2 weeks as well as check his potassium and magnesium levels that day, both of which were normal.    Patient is here today to follow up on the results of his event monitor.  He feels the best physically and mentally now since before COVID.  He feels more mentally awake and overall energized.      Since his last visit he has been cutting back on his sugar intake as he tells me he was a self-professed sugar-aholic.  He also has been gradually increasing his exercise including a regimen of walking, jogging as well as low impact cardio videos on YouTube.  In addition, he was diagnosed with a left frozen shoulder and completed a home exercise regimen after which he has not had any further left shoulder pain nor chest tightness and questions whether his frozen shoulder was the cause of the atypical chest pain he was experiencing in January 2023.    Denies shortness of breath, orthopnea or PND. He has trace sock line edema at the end of the day when he wear tight socks.     He will feel an occasional skipped beat or palpitation.  His event monitor showed multiple patient triggered events correlating with sinus rhythm, with a rare correlation with a PVC.  No arrhythmias.  Heart rate range  bpm.    Blood pressure 125/81 and HR 96  in clinic today.    Labs reviewed from 1/13/2023 with potassium 4.1, magnesium 2.2.        Recent Hospitalizations   None in 2022          Social History      Social History     Socioeconomic History     Marital status:      Spouse name: Not on file     Number of children: Not on file     Years of education: Not on file     Highest education level: Not on file   Occupational History     Not on file   Tobacco Use     Smoking status: Never     Smokeless tobacco: Never   Substance and Sexual Activity     Alcohol use: Yes     Comment: rare     Drug use: No     Sexual activity: Not on file   Other Topics Concern     Parent/sibling w/ CABG, MI or angioplasty before 65F 55M? No   Social History Narrative     Not on file     Social Determinants of Health     Financial Resource Strain: Not on file   Food Insecurity: Not on file   Transportation Needs: Not on file   Physical Activity: Not on file   Stress: Not on file   Social Connections: Not on file   Intimate Partner Violence: Not on file   Housing Stability: Not on file            Review of Systems:   Skin:        Eyes:       ENT:       Respiratory:  Negative    Cardiovascular:  Negative;chest pain;edema;syncope or near-syncope;cyanosis;lightheadedness;dizziness;fatigue;exercise intolerance palpitations;Positive for  Gastroenterology:      Genitourinary:       Musculoskeletal:       Neurologic:       Psychiatric:       Heme/Lymph/Imm:       Endocrine:              Physical Exam:   Vitals: /81   Pulse 96   Ht 1.829 m (6')   Wt 93.8 kg (206 lb 11.2 oz)   BMI 28.03 kg/m     Wt Readings from Last 4 Encounters:   03/02/23 93.8 kg (206 lb 11.2 oz)   01/13/23 94 kg (207 lb 4.8 oz)   01/05/23 92.1 kg (203 lb)   01/02/23 93 kg (205 lb)     GEN: well nourished, in no acute distress.  HEENT:  Pupils equal, round. Sclerae nonicteric.   NECK: Supple, no masses appreciated.   C/V:  Regular rate and rhythm, no murmur, rub or gallop.    RESP: Respirations are unlabored.  Clear to auscultation bilaterally without wheezing, rales, or rhonchi.  GI: Abdomen soft, nontender.  EXTREM: no LE edema.  NEURO: Alert and oriented, cooperative.  SKIN: Warm and dry.       Data:     LIPID RESULTS:  Lab Results   Component Value Date    CHOL 139 02/07/2022    CHOL 147 05/07/2021    HDL 63 02/07/2022    HDL 56 05/07/2021    LDL 65 02/07/2022    LDL 79 05/07/2021    TRIG 54 02/07/2022    TRIG 60 05/07/2021     LIVER ENZYME RESULTS:  Lab Results   Component Value Date    ALT 25 02/07/2022    ALT 7 05/18/2020     CBC RESULTS:  Lab Results   Component Value Date    WBC 5.0 01/05/2023    WBC 4.8 01/23/2019    RBC 5.37 01/05/2023    RBC 4.95 01/23/2019    HGB 16.1 01/05/2023    HGB 14.9 01/23/2019    HCT 45.6 01/05/2023    HCT 43.6 01/23/2019    MCV 85 01/05/2023    MCV 88 01/23/2019    MCH 30.0 01/05/2023    MCH 30.1 01/23/2019    MCHC 35.3 01/05/2023    MCHC 34.2 01/23/2019    RDW 12.0 01/05/2023    RDW 12.7 01/23/2019     01/05/2023     01/23/2019     BMP RESULTS:  Lab Results   Component Value Date     01/05/2023     05/07/2021    POTASSIUM 4.1 01/13/2023    POTASSIUM 3.7 05/07/2021    CHLORIDE 106 01/05/2023    CHLORIDE 108 05/07/2021    CO2 27 01/05/2023    CO2 32 05/07/2021    ANIONGAP 7 01/05/2023    ANIONGAP 1 (L) 05/07/2021    GLC 94 01/05/2023    GLC 94 05/07/2021    BUN 20 01/05/2023    BUN 17 05/07/2021    CR 1.05 01/05/2023    CR 1.11 05/07/2021    GFRESTIMATED 83 01/05/2023    GFRESTIMATED 74 05/07/2021    GFRESTBLACK 86 05/07/2021    KELY 9.3 01/05/2023    KELY 9.2 05/07/2021      A1C RESULTS:  No results found for: A1C  INR RESULTS:  Lab Results   Component Value Date    INR 1.06 01/05/2023    INR 1.04 01/23/2019            Medications     Current Outpatient Medications   Medication Sig Dispense Refill     aspirin (ASA) 81 MG EC tablet Take 1 tablet (81 mg) by mouth daily 90 tablet 3     Cyanocobalamin 5000 MCG TBDP Take 5,000 mcg by mouth daily       nitroGLYcerin  (NITROSTAT) 0.4 MG sublingual tablet For chest pain place 1 tablet under the tongue every 5 minutes for 3 doses. If symptoms persist 5 minutes after 1st dose call 911. 25 tablet 3     potassium chloride ER (KLOR-CON M) 10 MEQ CR tablet Take 1 tablet (10 mEq) by mouth daily 90 tablet 3     rosuvastatin (CRESTOR) 40 MG tablet Take 1 tablet (40 mg) by mouth daily 90 tablet 3     VITAMIN D, CHOLECALCIFEROL, PO Take 1,000 Units by mouth 2 times daily Pt takes 2000 units in the am and 1000 units in the PM            Past Medical History     Past Medical History:   Diagnosis Date     CAD (coronary artery disease)     Cath 5/10/18-lateral MI,  VF arrest,  PCI with revascularization to OM- EVELYN placed; jailing of OM-1--dilated lad 30%; mild disease elsewhere     Hyperlipidemia      Hyperparathyroidism (H)      Infection due to 2019 novel coronavirus 08/2022     Kidney calculi      Migraine      Mitral regurgitation      PVC (premature ventricular contraction)      Raynaud's disease      Vitamin B12 deficiency (non anemic)      Vitamin D deficiency      Past Surgical History:   Procedure Laterality Date     CV CORONARY ANGIOGRAM N/A 1/5/2023    Procedure: Coronary Angiogram;  Surgeon: Kely Parnell MD;  Location: [facility] CARDIAC CATH LAB     CV HEART CATHETERIZATION WITH POSSIBLE INTERVENTION N/A 1/23/2019    Procedure: Coronary Angiogram with OCT and IVUS to RCA for ulcerated plaque in CTA;  Surgeon: Jeramy Carter MD;  Location: [facility] CARDIAC CATH LAB     PARATHYROIDECTOMY       Family History   Problem Relation Age of Onset     Arrhythmia Mother      Hyperlipidemia Mother      Hypertension Father      Myocardial Infarction Father 81            Allergies   Augmentin, Coreg [carvedilol], and Metoprolol        Rola Finn NP  [facility]  Pager: [phone]    Thank you for allowing me to participate in the care of your patient.      Sincerely,     SAYRA Ortega  [facility]  cc:   Rola Finn NP  [address] [address]

## 2023-03-02 NOTE — PATIENT INSTRUCTIONS
Today's Recommendations    Good work on your diet and lifestyle modifications  I would like you to have your fasting cholesterol in the next month  Continue all medications without changes.  Please follow up with Dr. Carter in 18 months, Sept 2024 with an exercise stress test before.    Please send a [employer] message or call [phone] to the RN team with questions or concerns.     Scheduling number [phone]  ENRIKE Ortega, CNP

## 2023-03-02 NOTE — PROGRESS NOTES
Cardiology Clinic Progress Note  Braulio Bunch MRN# [id number]   YOB: 1965 Age: 57 year old   Primary Cardiologist: Dr. Carter Reason for visit: Follow up after angiogram             Assessment and Plan:     1.  Coronary artery disease, s/p EVELYN x1 to OM2, history of STEMI with V. fib arrest (2018)  -Coronary angiogram in 2019 showing residual trivial plaque in LAD, no significant in-stent restenosis, trivial right coronary artery plaque proximally.  -1/5/2023 coronary angiogram showed stable nonobstructive coronary disease, prior OM stent was widely patent  -Continue aspirin, statin  -No further chest pain or chest tightness    2.  Hyperlipidemia  -lipid panel done 7/2022 with borderline LDL control, total cholesterol 146, trig 55, LDL 82, and HDL 53  -Patient to continue to focus on mediterranian diet and increasing exercise, I praised him for his efforts in this regard today  -Patient have repeat lipid panel drawn within the next month    3. Palpitations with near syncope, family history of arrythmias  -History of near syncope and 2020 with a negative Holter monitor  -Potassium and magnesium levels normal  -Near syncope in January 2023, event monitor did not show any concerning arrhythmias, occasional PVCs and couplets sinus rhythm during triggered patient events    Considering patient's unremarkable event monitor, resolution of symptoms with increased exercise and lifestyle modifications, and recent coronary angiogram showing stable nonobstructive coronary disease with patent prior OM stent I think is reasonable for him to follow-up with the original timeline with Dr. Carter in 2024.    Changes today: No medication changes made    Follow up plan: Patient to follow-up in 18 months with Dr. Carter with a stress echocardiogram prior        History of Presenting Illness:    Braulio Bunch is a very pleasant 57 year old male with a history of coronary artery disease (s/p V. fib cardiac arrest  2018), inferolateral MI, s/p EVELYN x1 to OM 2, residual mild LAD, and RCA disease, hyperlipidemia, Raynaud's disease, and hyperparathyroidism.    Patient's mother had an ablation for a tachycardic heart rhythm, unclear what rhythm.     Patient suffered a STEMI with an inferior wall MI in 2018 with a V. fib arrest on the way to the Cath Lab, he was shocked back to sinus rhythm and had torsades directly prior to the angiogram.  He underwent EVELYN x1 into an occluded ostial OM2 branch jailing the OM1 with PTCA.  He had a 30% LAD stenosis and right coronary artery had mild irregularities.    TTE done 12/2018 showed prominent lateral, inferior, and anterior regional wall motion abnormalities.  A CTA was done and look suspicious for a possible ulcer crater/vulnerable plaque in the RCA.  A repeat coronary angiogram was completed 1/2019 using OCT.  No concerning lesions were found. He completed 1 year of DAPT.    TTE done 5/2021 showed LVEF 55 to 60%, basal to mid inferior, inferolateral/anterolateral wall hypokinesis, and mild mitral regurgitation.  Normal aortic dimensions.  Unchanged when compared to 5/2020.    Patient was most recently seen by Dr. Carter in September 2022, at which point in time he was doing well from a cardiac standpoint.  Plan was to repeat stress echocardiogram in 2024.    On 12/29/2022 patient called and reported chest heaviness and shortness of breath with exertion.     I saw patient on 1/2/23 for an acute visit secondary to episodes of chest tightness with exertion and at rest. I reviewed his symptoms and history with his primary cardiologist, Dr. Carter, who recommended coronary angiography.     Coronary angiogram was done 1/5/2023 showing a mid RCA lesion 35% stenosed, second diagonal lesion 40% stenosed, mid LAD lesion 20% stenosed, considered stable, nonobstructive coronary disease, and his prior OM stent was widely patent.    In clinic following his angiogram, he shared that he had an episode  the day following his angiogram where he felt palpitations and a fluttering sensation in his mid sternum and he became weak, and near fainted, almost losing consciousness while walking in [employer].  He had a similar episode in 2020 in his kitchen, where his knees suddenly became weak and he fell to the floor of his kitchen and a holter monitor was done which showed <1% PVCs (isolated) and <1% SVT. Average HR at that time was 54 bpm and metoprolol was stopped.  I had him wear an event monitor for 2 weeks as well as check his potassium and magnesium levels that day, both of which were normal.    Patient is here today to follow up on the results of his event monitor.  He feels the best physically and mentally now since before COVID.  He feels more mentally awake and overall energized.      Since his last visit he has been cutting back on his sugar intake as he tells me he was a self-professed sugar-aholic.  He also has been gradually increasing his exercise including a regimen of walking, jogging as well as low impact cardio videos on YouTube.  In addition, he was diagnosed with a left frozen shoulder and completed a home exercise regimen after which he has not had any further left shoulder pain nor chest tightness and questions whether his frozen shoulder was the cause of the atypical chest pain he was experiencing in January 2023.    Denies shortness of breath, orthopnea or PND. He has trace sock line edema at the end of the day when he wear tight socks.     He will feel an occasional skipped beat or palpitation.  His event monitor showed multiple patient triggered events correlating with sinus rhythm, with a rare correlation with a PVC.  No arrhythmias.  Heart rate range  bpm.    Blood pressure 125/81 and HR 96 in clinic today.    Labs reviewed from 1/13/2023 with potassium 4.1, magnesium 2.2.        Recent Hospitalizations   None in 2022          Social History      Social History     Socioeconomic History      Marital status:      Spouse name: Not on file     Number of children: Not on file     Years of education: Not on file     Highest education level: Not on file   Occupational History     Not on file   Tobacco Use     Smoking status: Never     Smokeless tobacco: Never   Substance and Sexual Activity     Alcohol use: Yes     Comment: rare     Drug use: No     Sexual activity: Not on file   Other Topics Concern     Parent/sibling w/ CABG, MI or angioplasty before 65F 55M? No   Social History Narrative     Not on file     Social Determinants of Health     Financial Resource Strain: Not on file   Food Insecurity: Not on file   Transportation Needs: Not on file   Physical Activity: Not on file   Stress: Not on file   Social Connections: Not on file   Intimate Partner Violence: Not on file   Housing Stability: Not on file            Review of Systems:   Skin:        Eyes:       ENT:       Respiratory:  Negative    Cardiovascular:  Negative;chest pain;edema;syncope or near-syncope;cyanosis;lightheadedness;dizziness;fatigue;exercise intolerance palpitations;Positive for  Gastroenterology:      Genitourinary:       Musculoskeletal:       Neurologic:       Psychiatric:       Heme/Lymph/Imm:       Endocrine:              Physical Exam:   Vitals: /81   Pulse 96   Ht 1.829 m (6')   Wt 93.8 kg (206 lb 11.2 oz)   BMI 28.03 kg/m     Wt Readings from Last 4 Encounters:   03/02/23 93.8 kg (206 lb 11.2 oz)   01/13/23 94 kg (207 lb 4.8 oz)   01/05/23 92.1 kg (203 lb)   01/02/23 93 kg (205 lb)     GEN: well nourished, in no acute distress.  HEENT:  Pupils equal, round. Sclerae nonicteric.   NECK: Supple, no masses appreciated.   C/V:  Regular rate and rhythm, no murmur, rub or gallop.    RESP: Respirations are unlabored. Clear to auscultation bilaterally without wheezing, rales, or rhonchi.  GI: Abdomen soft, nontender.  EXTREM: no LE edema.  NEURO: Alert and oriented, cooperative.  SKIN: Warm and dry.       Data:      LIPID RESULTS:  Lab Results   Component Value Date    CHOL 139 02/07/2022    CHOL 147 05/07/2021    HDL 63 02/07/2022    HDL 56 05/07/2021    LDL 65 02/07/2022    LDL 79 05/07/2021    TRIG 54 02/07/2022    TRIG 60 05/07/2021     LIVER ENZYME RESULTS:  Lab Results   Component Value Date    ALT 25 02/07/2022    ALT 7 05/18/2020     CBC RESULTS:  Lab Results   Component Value Date    WBC 5.0 01/05/2023    WBC 4.8 01/23/2019    RBC 5.37 01/05/2023    RBC 4.95 01/23/2019    HGB 16.1 01/05/2023    HGB 14.9 01/23/2019    HCT 45.6 01/05/2023    HCT 43.6 01/23/2019    MCV 85 01/05/2023    MCV 88 01/23/2019    MCH 30.0 01/05/2023    MCH 30.1 01/23/2019    MCHC 35.3 01/05/2023    MCHC 34.2 01/23/2019    RDW 12.0 01/05/2023    RDW 12.7 01/23/2019     01/05/2023     01/23/2019     BMP RESULTS:  Lab Results   Component Value Date     01/05/2023     05/07/2021    POTASSIUM 4.1 01/13/2023    POTASSIUM 3.7 05/07/2021    CHLORIDE 106 01/05/2023    CHLORIDE 108 05/07/2021    CO2 27 01/05/2023    CO2 32 05/07/2021    ANIONGAP 7 01/05/2023    ANIONGAP 1 (L) 05/07/2021    GLC 94 01/05/2023    GLC 94 05/07/2021    BUN 20 01/05/2023    BUN 17 05/07/2021    CR 1.05 01/05/2023    CR 1.11 05/07/2021    GFRESTIMATED 83 01/05/2023    GFRESTIMATED 74 05/07/2021    GFRESTBLACK 86 05/07/2021    KELY 9.3 01/05/2023    KELY 9.2 05/07/2021      A1C RESULTS:  No results found for: A1C  INR RESULTS:  Lab Results   Component Value Date    INR 1.06 01/05/2023    INR 1.04 01/23/2019            Medications     Current Outpatient Medications   Medication Sig Dispense Refill     aspirin (ASA) 81 MG EC tablet Take 1 tablet (81 mg) by mouth daily 90 tablet 3     Cyanocobalamin 5000 MCG TBDP Take 5,000 mcg by mouth daily       nitroGLYcerin (NITROSTAT) 0.4 MG sublingual tablet For chest pain place 1 tablet under the tongue every 5 minutes for 3 doses. If symptoms persist 5 minutes after 1st dose call 911. 25 tablet 3     potassium  chloride ER (KLOR-CON M) 10 MEQ CR tablet Take 1 tablet (10 mEq) by mouth daily 90 tablet 3     rosuvastatin (CRESTOR) 40 MG tablet Take 1 tablet (40 mg) by mouth daily 90 tablet 3     VITAMIN D, CHOLECALCIFEROL, PO Take 1,000 Units by mouth 2 times daily Pt takes 2000 units in the am and 1000 units in the PM            Past Medical History     Past Medical History:   Diagnosis Date     CAD (coronary artery disease)     Cath 5/10/18-lateral MI,  VF arrest,  PCI with revascularization to OM- EVELYN placed; jailing of OM-1--dilated lad 30%; mild disease elsewhere     Hyperlipidemia      Hyperparathyroidism (H)      Infection due to 2019 novel coronavirus 08/2022     Kidney calculi      Migraine      Mitral regurgitation      PVC (premature ventricular contraction)      Raynaud's disease      Vitamin B12 deficiency (non anemic)      Vitamin D deficiency      Past Surgical History:   Procedure Laterality Date     CV CORONARY ANGIOGRAM N/A 1/5/2023    Procedure: Coronary Angiogram;  Surgeon: Kely Parnell MD;  Location: [facility] CARDIAC CATH LAB     CV HEART CATHETERIZATION WITH POSSIBLE INTERVENTION N/A 1/23/2019    Procedure: Coronary Angiogram with OCT and IVUS to RCA for ulcerated plaque in CTA;  Surgeon: Jeramy Carter MD;  Location:  HEART CARDIAC CATH LAB     PARATHYROIDECTOMY       Family History   Problem Relation Age of Onset     Arrhythmia Mother      Hyperlipidemia Mother      Hypertension Father      Myocardial Infarction Father 81            Allergies   Augmentin, Coreg [carvedilol], and Metoprolol        Rola Finn NP  [facility]  Pager: [phone]

## 2023-04-03 ENCOUNTER — LAB (OUTPATIENT)
Dept: LAB | Facility: CLINIC | Age: 58
End: 2023-04-03
Payer: COMMERCIAL

## 2023-04-03 DIAGNOSIS — I25.10 CORONARY ARTERY DISEASE INVOLVING NATIVE CORONARY ARTERY OF NATIVE HEART WITHOUT ANGINA PECTORIS: ICD-10-CM

## 2023-04-03 LAB
CHOLEST SERPL-MCNC: 142 MG/DL
HDLC SERPL-MCNC: 53 MG/DL
LDLC SERPL CALC-MCNC: 73 MG/DL
NONHDLC SERPL-MCNC: 89 MG/DL
TRIGL SERPL-MCNC: 82 MG/DL

## 2023-04-03 PROCEDURE — 80061 LIPID PANEL: CPT | Performed by: NURSE PRACTITIONER

## 2023-04-03 PROCEDURE — 36415 COLL VENOUS BLD VENIPUNCTURE: CPT | Performed by: NURSE PRACTITIONER

## 2023-08-24 NOTE — TELEPHONE ENCOUNTER
Prior Authorization Retail Medication Request    Medication/Dose: bystolic 2.5mg  ICD code (if different than what is on RX):  CAD,Raynauds,stents  Previously Tried and Failed:  Coreg and metoprolol, severe fatigue  Rationale:  Pt stated he has improved fatigue on the bystolic, needs to be on a  BB    Insurance Name: [employer]  Insurance ID:  [id number]      Pharmacy Information (if different than what is on RX)  Name:  [facility]  Phone:  [phone]     Dr. ty

## 2023-09-11 DIAGNOSIS — E87.6 HYPOKALEMIA: ICD-10-CM

## 2023-09-11 RX ORDER — POTASSIUM CHLORIDE 750 MG/1
10 TABLET, EXTENDED RELEASE ORAL DAILY
Qty: 90 TABLET | Refills: 3 | Status: SHIPPED | OUTPATIENT
Start: 2023-09-11

## 2023-09-17 ENCOUNTER — HEALTH MAINTENANCE LETTER (OUTPATIENT)
Age: 58
End: 2023-09-17

## 2023-09-22 DIAGNOSIS — I21.19 ST ELEVATION MYOCARDIAL INFARCTION (STEMI) INVOLVING OTHER CORONARY ARTERY OF INFERIOR WALL (H): ICD-10-CM

## 2023-09-22 RX ORDER — NITROGLYCERIN 0.4 MG/1
TABLET SUBLINGUAL
Qty: 25 TABLET | Refills: 3 | Status: SHIPPED | OUTPATIENT
Start: 2023-09-22

## 2024-01-01 NOTE — PROGRESS NOTES
[facility]   Intensive Care Unit Daily Note    Name: Cole Anders  (Male-Silvio Zaragoza)  Parents: Silvio Zaragoza and Jennifer Anders  YOB: 2023    History of Present Illness   Cole was born  at 25w6d weighing 1 lb 14 oz (850 g) by spontaneous vaginal delivery due to  labor at [facility].     Hospital course issues:   Patient Active Problem List   Diagnosis    Extreme immaturity of , gestational age 25 completed weeks    Respiratory distress syndrome in  (H28)    Respiratory failure of  (H28)    Slow feeding in     PDA (patent ductus arteriosus)    ELBW (extremely low birth weight) infant     hyperbilirubinemia    Apnea of prematurity    Necrotizing enterocolitis (H24)    Moderate malnutrition (H24)    BPD (bronchopulmonary dysplasia) (H28)    Hyponatremia    Diuretic-induced hypokalemia    Direct hyperbilirubinemia,     Hepatic hemangioma    NICKI (acute kidney injury) (H24)      Interval History    No acute events overnight.  Remains on LFNC.  19% po.   Appropriate I/O, ~ at fluid goal with adequate UO and stool.    Vitals:    09/10/24 0223 24 0230 24 0300   Weight: 2.63 kg (5 lb 12.8 oz) 2.65 kg (5 lb 13.5 oz) 2.68 kg (5 lb 14.5 oz)   Weight change: 0.03 kg (1.1 oz)       Assessment & Plan   Overall Status:    2 month old  VLBW male infant who is now 38w4d PMA with BPD.   H/o recurrent NEC and direct hyperbilirubinemia.  Transitioning to     This patient, whose weight is < 5000 grams (2.68 kg),  is no longer critically ill.  He still requires supplemental oxygen, gavage feeds and CR monitoring, due to multiple complication of prematurity      Care plan highlights and changes today (2024):  - continue 1/16 lpm  - wean NaCl  - adrenal insufficiency requiring hydrocortisone - continue q12 hr   - See below for details of overall ongoing plan by system, PE, and daily  [facility]  Cardiology Progress Note  Date of Service: 05/11/2018  Primary Cardiologist: Dr. Carter    Assessment & Plan    Braulio Bunch is a 53 year old male with past medical history significant for hyperlipidemia admitted on 5/10/2018 with STEMI and V. Fib cardiac arrest.    Assessment:  1. Inferolateral STEMI: Troponin peaked at 103. Status post balloon angioplasty of mid LCx at the ostium of OM1/OM2, OCT of OM1 and OM2 PCI and EVELYN across the mCx into the ostial OM2 (acute 100% occlusion) and PTCA of ostial OM1.     DAPT with ASA and Ticagrelor    2. Ongoing chest pain: Previously on nitro gtt with ongoing discomfort. IV Morphine given last evening, without resolution of symptoms. Nitro gtt off due to hypotension.    3. V. Fib cardiac arrest: Successfully stocked.     4. V. Tachycardia: Asymptomatic with 5 beat run last evening.    5. Hyperlipidemia: Rosuvastatin 20 mg daily    Plan:   1. Echo pending  2. Cardiac rehab    Outpatient follow up   ENRIKE Escamilla, CNP on 5/25 at 1050 with BMP 0950  Follow up with Dr. Carter 7/30 at 0815    ENRIKE Goodman, CNP  [facility]  Pager: [phone]     Interval History   RRT called this morning as patient was hypotensive and had decreased level of consciousness. BP 73/43. Nitro gtt held and IV fluid bolus. Hold Coreg and Lisinopril for hypotension.     Physical Exam   Temp: 99.3  F (37.4  C) Temp src: Oral BP: 102/70 Pulse: 82 Heart Rate: 82 Resp: 9 SpO2: 95 % O2 Device: Nasal cannula Oxygen Delivery: 2 LPM  Vitals:    05/10/18 0756   Weight: 94.3 kg (207 lb 14.3 oz)       Constitutional:   Pale, diaphoretic.    Skin:   Warm and dry   Head:   Nontraumatic   Neck:   no JVD   Lungs:   symmetric, clear to auscultation   Cardiovascular:   regular rate and rhythm, no murmurs   Abdomen:   Benign   Extremities and Back:   No edema   Neurological:   Grossly nonfocal       Medications     nitroGLYcerin 0.57 mcg/kg/min (05/11/18 0546)      Percutaneous Coronary Intervention orders placed (this is information for BPA alerting)         aspirin  81 mg Oral Daily     carvedilol  6.25 mg Oral BID     lisinopril  5 mg Oral Daily     rosuvastatin  20 mg Oral Daily     ticagrelor  90 mg Oral Q12H       Data     Most Recent 3 BMP's:  Recent Labs   Lab Test  05/11/18   0526  05/10/18   2145  05/10/18   0800   NA  139   --   140   POTASSIUM  4.0  3.8  3.2*   CHLORIDE  107   --   107   CO2  26   --   25   BUN  9   --   22   CR  0.81   --   1.13   ANIONGAP  6   --   8   KELY  8.0*   --   8.9   GLC  112*   --   143*     Most Recent 3 Troponin's:  Recent Labs   Lab Test  05/10/18   2145  05/10/18   1420  05/10/18   1110   TROPI  72.576*  103.129*  68.333*     Most Recent Cholesterol Panel:  Recent Labs   Lab Test  05/10/18   1110   CHOL  199   LDL  133*   HDL  60   TRIG  28        communications.  ------     Vascular Access:  None at present  PICC, placed in IR, -   PICC (Demetrius, CORNELIUSKASANDRA, placed ), removed  since tolerating full fortified feeds and oral sedation.    FEN/GI:   Growth:AGA at birth. Sub-optimal  linear growth. On Zinc therapy.   Malnutrition: Infant currently meet diagnostic criteria for moderate malnutrition per recent RD assessment.   Diuretic-induced electrolyte anomalies - improved with supplementation.    Feeding:  Mother planned to breast feed and is pumping. Rc'd DHM per protocol.  On and off feeds -  due to feeding intolerance and concerns for NEC  Recurrent bloody stool/NEC IIA - : Noted overnight on -3 in setting of reaching full enteral feeds and fortifying to 26 kcal/oz. XR w/ diffuse colonic pneumatosis. No clinical decompensation. Feeds restarted and advanced without issues. H/o prolacta.   Oral intake: 30-45%    Plan to continue:  - TF goal of 150 ml/kg/day - mild restriction due to BPD.  - IDF schedule with bottle/gavage feed of MBMF 24 kcal +LP or SCF24 q 3 hrs   - monitoring feeding tolerance, fluid status, and overall growth.    - HOB flat.   - OT input   - assistance from lactation specialist.   - input from dietician wrt nutritional status/management/monitoring.    - Meds/supplements: NaCl, KCl, Vit D, glycerin prn  - Labs:  lytes qM/Thurs.       > Hyperbilirubinemia:   Resolved physiologic indirect hyperbilirubinemia. Maternal blood type O+. Infant blood type O+ RISA neg.     Direct hyperbilirubinia with feeding intolerance and NEC. Significantly improved with normalization of transaminases and GGT.   GI consulted and following with us.  Peak 8.56 on   TSH , 8/3- normal. AST/ALT mildly elevated.  Hepatic US with  no intrahepatic or extrahepatic biliary  ductal dilatation, common bile duct measures 0.2 cm, and gallbladder contracted. Hemangioma also noted - see below.     24 Significantly improved with normalization  of transaminases and GGT.  Off Actigall and MVW  - review weekly with Dr. Gaspar on wed GI rounds - see notes,   - qMonday d/t bili then prn if wnl 9/16. (no longer following complete panel)  Bilirubin Direct   Date Value Ref Range Status   2024 0.67 (H) 0.00 - 0.30 mg/dL Final   2024 1.58 (H) 0.00 - 0.30 mg/dL Final     Bilirubin Total   Date Value Ref Range Status   2024 1.3 (H) <=1.0 mg/dL Final   2024 2.5 (H) <=1.0 mg/dL Final     AST   Date Value Ref Range Status   2024 33 20 - 65 U/L Final   2024 71 (H) 20 - 65 U/L Final     ALT   Date Value Ref Range Status   2024 26 0 - 50 U/L Final   2024 46 0 - 50 U/L Final     GGT   Date Value Ref Range Status   2024 97 0 - 178 U/L Final   2024 219 (H) 0 - 178 U/L Final     Alkaline Phosphatase   Date Value Ref Range Status   2024 576 (H) 110 - 320 U/L Final   2024 613 (H) 110 - 320 U/L Final       > Liver hemangiomas: Two slightly hyperechoic hepatic lesions incidentally noted, possibly hemangiomas on AUS 7/15, stable 7/23. Increased in size and number (3) on 8/2. No associated skin lesions.    Dermatology/Vascular Anomalies consulted 8/2, recommended sending thyroid studies (nml 8/3 and 8/12) and echo to evaluate for high output heart failure initially (reassuring, see above)    8/21 GI note: Hepatic hemangiomas: Unlikely to be related to his cholestasis.  No extra hepatic findings.  Normal thyroid studies and no signs of high output heart failure.  These are most consistent with localized (normally only 1) vs multifocal (normally 5-10) infantile hemangiomas which growlow rapidly after brith and then involute starting at 9-12 months of age.  At this point since the hemangiomas are not clinictically symptomatic they can be followed.  Could consider starting propranolol if becoming symptomatic or rapidly growing     2024 repeat Liver US:   1. The smallest hemangioma seen previously is not  visualized on the current exam. Otherwise grossly stable hepatic hemangiomas.   2. Biliary sludge.    [facility] with doppler in 4 weeks (~10/9)      Respiratory:   BPD  H/o ongoing failure due to RDS, s/p CPAP x first 2 weeks of life with intubation on DOL 14 due to recurrent apnea.   S/p surfactant 7/1 (first dose) with good response. HFOV to conv vent 7/14. ETT upsized on 7/19.  Extubated to HOLMAN CPAP on 8/11. Weaned to HFNC 8/21. Weaned off HFNC on 8/28.    Current support: 1/16 L LPM, FiO2 100% OTW  Continue:  - to wean as tolerates.   - fluid restriction        - Diuril (40)  - CXR and CBG prn  - routine CR monitoring.     > Apnea of Prematurity: Several A/B/Ds week of 6/24. S/p extra caffeine bolus 6/27.   Stopped caffeine 8/18      Cardiovascular:   Good BP and perfusion. Murmur unchanged.  S/p device closure of PDA.    - monthly echo - next on 10/10 - to monitor for BPD associated PAH and device status.   - Continue routine CR monitoring.     Hx:  PDA: s/p prophylactic indomethacin, Tylenol #1 7/1-7/8, Tylenol #2 7/12 - 7/15, s/p device/surgical closure 7/16.   9/10 repeat Echo: device in good position, no residual shunt, good function. +PPS Unchanged.       Endocrine:   > Adrenal insufficiency: Cortisol level was 5 (7/1) in the setting of clinical illness, anuria and NICKI.   Hydrocortisone started 7/7, had weaned until worsening hyponatremia and NEC requiring load and increase 8/3.  - currently weaning Hydrocortisone ~3-5 days as tolerated - went to q12 hr on 2024. .   - Will need ACTH stim test ~2-4 weeks after off hydrocortisone.    > Risk of consumptive hypothyroidism with liver hemangiomas. TSH wnl last 7/22, 8/3, 8/12  - No further TFTs planned.  Obtain if hemangiomas increase on U/S or evidence of high output heart failure      Renal:  H/o multiple episodes of NICKI, with potential for CKD.   NICKI in the setting of indocin therapy. Max creat 1.57 on 6/19. Renal US/dopper   with no observed thrombus, mildly echogenic kidneys, compatible with history of acute kidney injury, mild right pelvocaliectasis.   Recurrent NICKI  with peak creatinine 1.21 in the setting of hypotension, adrenal insufficiency. AUS 7/15 showed echogenic kidneys consistent with medical renal disease.  New onset NICKI  with adrenal insufficiency and nephrotoxic meds, improved     Currently with good UO, Cr wnl, acceptable BP.   - Monitor UO/fluid status/BP  - Repeat US PTD  - outpatient remal follow-up indicated.   Creatinine   Date Value Ref Range Status   2024 0.16 - 0.39 mg/dL Final   2024 0.31 - 0.88 mg/dL Final     BP Readings from Last 6 Encounters:   24 73/48        ID:   No current infectious concerns. History significant for NECx2 (see below)  MRSA negative.     Hx  S/p empiric antibiotic therapy for possible sepsis at birth due to  delivery and RDS, evaluation neg. S/p IV ampicillin and gentamicin for 48 hours of coverage given clinical stability and negative blood culture. Sepsis evaluation initiated in the setting of worsening NICKI on , evaluation negative. S/p amp/ceftazidime for 48 hours. S/p sepsis eval  for worsening apnea, evaluation negative; s/p amp and gent x48 h.     Sepsis eval  w/ respiratory decompesnation. Blood and urine cultures NGTD. Trach gram stain <25 PMNs, culture 1+ cornyeabacterium and 1+ staph hominis (not treating as true infection). S/p nafcillin/gentamicin -. Sepsis eval  due to escalating respiratory requirements. CBC, CRP, blood, urine and trach cultures NGTD - normal carolin in trach cx. Vanco/Gentamicin - stopped on . S/p 24 hours ancef post-op from PDA device closure.    NEC IB: bloody stools X2 -, no radiographic signs of NEC with serial imagining. Bcx NTD.Was on Vanc - , changed to Amp (-). On Gent (-)    NEC Stage IIA diagnosed -3, Bcx and Ucx sent 8/3 remain NTD.  Completed 10 day course of broad spectrum antibiotics on       Hematology:   Anemia of Prematurity:  Received multiple transfusions, last . S/p darbepoeitin (last dose )  - continue iron supplementation per RD recs.   - monitor serial Hgb/ferrtin qo Mon - next   Hemoglobin   Date Value Ref Range Status   2024 (L) 10.5 - 14.0 g/dL Final   2024 (L) 10.5 - 14.0 g/dL Final     Ferritin   Date Value Ref Range Status   2024 85 ng/mL Final   2024 68 ng/mL Final     Platelet Count   Date Value Ref Range Status   2024 327 150 - 450 10e3/uL Final       CNS:   Poor interval head growth - <3%ile.  No IVH/PVL - normal HUS x2, last at 36 weeks CGA.    - Monitor clinical exam and weekly OFC measurements.    - Developmental cares per NICU protocol.  - serial GMA     Pain/Sedation:  - Methadone stopped     Ophthalmology:   Most recent ROP exam on 9/3: zone 3, stage 2  - follow up 3 weeks ()    Psychosocial:   - PMAD screening: Recognizing increased risk for  mood and anxiety disorders in NICU parents, plan for routine screening for parents at 1, 2, 4, and 6 months if infant remains hospitalized.     HCM and Discharge planning:   Screening tests indicated:  [place]  met[place] screen x3 - normal. CMV not detected.   CCHD screen completed by echo  - Hearing screen PTD  - Carseat trial to be done just PTD  - OT input.   - Continue standard NICU cares and family education plan.  - Consider outpatient care in [facility] and [facility].    Immunizations   Up-to-date. Next due ~10/19  Immunization History   Administered Date(s) Administered    DTAP,IPV,HIB,HEPB (VAXELIS) 2024    Hepatitis B, Peds 2024    Pneumococcal 20 valent Conjugate (Prevnar 20) 2024      Medications   Current Facility-Administered Medications   Medication Dose Route Frequency Provider Last Rate Last Admin    Breast Milk label for barcode  scanning 1 Bottle  1 Bottle Oral Q1H PRN Mahi Lim MD   1 Bottle at 09/12/24 1132    chlorothiazide (DIURIL) suspension 50 mg  20 mg/kg Oral or OG tube Q12H Francesca Zee APRN CNP   50 mg at 09/12/24 1132    cyclopentolate-phenylephrine (CYCLOMYDRYL) 0.2-1 % ophthalmic solution 1 drop  1 drop Both Eyes Q5 Min PRN Fco Brooks MD   1 drop at 09/03/24 1226    ferrous sulfate (PRASANNA-IN-SOL) oral drops 7.2 mg  6 mg/kg/day Oral BID Francesca Zee APRN CNP   7.2 mg at 09/12/24 0831    glycerin (PEDI-LAX) Suppository 0.125 suppository  0.125 suppository Rectal Daily PRN Otto Hall NP   0.125 suppository at 08/29/24 0503    hydrocortisone (CORTEF) suspension 0.26 mg  0.26 mg Per OG Tube Q12H Cielo Michele NP   0.26 mg at 09/12/24 0831    potassium chloride oral solution 1.25 mEq  2 mEq/kg/day Oral Q6H Kole Jaramillo MD   1.25 mEq at 09/12/24 0831    saline nasal (AYR SALINE) topical gel   Each Nare 4x Daily Trista Parekh NP        sodium chloride ORAL solution 3.6 mEq  5.5 mEq/kg/day (Dosing Weight) Oral Q6H Trista Parekh NP        sucrose (SWEET-EASE) solution 0.2-2 mL  0.2-2 mL Oral Q1H PRN Jacquelin Barboza MD   0.2 mL at 09/03/24 1346    tetracaine (PONTOCAINE) 0.5 % ophthalmic solution 1 drop  1 drop Both Eyes WEEKLY Fco Brooks MD   1 drop at 09/03/24 1346    zinc sulfate solution 22 mg  8.8 mg/kg (Dosing Weight) Oral Daily Cielo Michele NP   22 mg at 09/11/24 1702        Physical Exam    GENERAL: NAD, male infant. Overall appearance c/w CGA.   RESPIRATORY: Chest CTA with equal breath sounds, no retractions.  LFNC in place  CV: RRR, no murmur, strong/sym pulses in UE/LE, good perfusion.   ABDOMEN: soft, +BS, no HSM.   CNS: Tone appropriate for GA. AFOF. MAEE.   ---      Communications   Parents:   Name Home Phone Work Phone Mobile Phone Relationship Lgl Grorly   BERNARDCELIO [phone] Mother    ABIMBOLA ENRIQUE [facility] [phone] Father        Family lives in [place], [place].   not needed.   Silvio updated via q-rounds.    Care Conferences:   None to date.    PCPs:   Infant PCP: SHILPA Wadsworth  Maternal OB PCP: LIANNA Sher. Updated via EPIC 7/27, 8/23  Delivering Provider: Dr. Blanchard   Providers verified with mother.    Health Care Team:  Patient discussed with the care team.    A/P, imaging studies, laboratory data, medications and family situation reviewed.    Molly Rosales MD

## 2024-11-10 ENCOUNTER — HEALTH MAINTENANCE LETTER (OUTPATIENT)
Age: 59
End: 2024-11-10

## 2024-12-02 ENCOUNTER — HOSPITAL ENCOUNTER (OUTPATIENT)
Dept: CARDIOLOGY | Facility: CLINIC | Age: 59
Discharge: HOME | End: 2024-12-02
Attending: NURSE PRACTITIONER | Admitting: NURSE PRACTITIONER
Payer: COMMERCIAL

## 2024-12-02 DIAGNOSIS — I25.10 CORONARY ARTERY DISEASE INVOLVING NATIVE CORONARY ARTERY OF NATIVE HEART WITHOUT ANGINA PECTORIS: ICD-10-CM

## 2024-12-02 PROCEDURE — 93325 DOPPLER ECHO COLOR FLOW MAPG: CPT | Mod: TC

## 2024-12-02 PROCEDURE — C8928 TTE W OR W/O FOL W/CON,STRES: HCPCS

## 2024-12-02 PROCEDURE — 255N000002 HC RX 255 OP 636: Performed by: NURSE PRACTITIONER

## 2024-12-02 RX ADMIN — HUMAN ALBUMIN MICROSPHERES AND PERFLUTREN 9 ML: 10; .22 INJECTION, SOLUTION INTRAVENOUS at 09:02

## 2024-12-18 ENCOUNTER — OFFICE VISIT (OUTPATIENT)
Dept: CARDIOLOGY | Facility: CLINIC | Age: 59
End: 2024-12-18
Payer: COMMERCIAL

## 2024-12-18 VITALS
DIASTOLIC BLOOD PRESSURE: 75 MMHG | BODY MASS INDEX: 28.63 KG/M2 | HEART RATE: 91 BPM | SYSTOLIC BLOOD PRESSURE: 117 MMHG | WEIGHT: 216 LBS | HEIGHT: 73 IN

## 2024-12-18 DIAGNOSIS — I25.10 CORONARY ARTERY DISEASE INVOLVING NATIVE CORONARY ARTERY OF NATIVE HEART WITHOUT ANGINA PECTORIS: Primary | ICD-10-CM

## 2024-12-18 RX ORDER — MULTIVITAMIN
1 TABLET ORAL DAILY
COMMUNITY

## 2024-12-18 NOTE — PROGRESS NOTES
HPI and Plan:   Pleasure of following up with Marco A we met him in 2018 he presented with chest pain and had a V-fib cardiac arrest on transport to the heart Cath Lab.  Heart cath was performed immediately after resuscitation of the right coronary artery and the LAD had trivial disease the left circumflex had a very large bifurcating OM and it was essentially occluded we were able to use 1 stent and get both arms open.  He has had a couple angiograms since that time the last of which was a couple years ago we reviewed that last angiogram and it is identical the stent is widely patent and there is still trivial disease in the LAD and right coronary artery.    He did have a stress echo that we reviewed today and again shows previous inferior posterior infarct preserved ejection fraction and no ischemia and a very good exercise time    He also had hyperparathyroidism and B12 deficiency he is on supplements for the B12 his neuropathy symptoms went away he is not anemic.  His calcium level and PTH were checked by his internist they are normal.  His cholesterol numbers are essentially normal his LDL is slightly above 70 our usual goal is below 70 he is on Crestor 40 I will leave it to the patient and Dr. Angel if they want to consider adding Zetia to the Crestor but the truth is 5 or 6 years later his angiograms shows essentially the same coronary anatomy physical exam is unremarkable.  At this time I make no changes we will leave the possible Zetia addition to the patient and Dr. Angel and we will see the patient back in 2 years for routine follow-up.  Today's visit was 40 minutes    No orders of the defined types were placed in this encounter.    Orders Placed This Encounter   Medications    multivitamin w/minerals (MULTI-VITAMIN) tablet     Sig: Take 1 tablet by mouth daily.     There are no discontinued medications.      No diagnosis found.    CURRENT MEDICATIONS:  Current Outpatient Medications   Medication Sig Dispense  Refill    aspirin (ASA) 81 MG EC tablet Take 1 tablet (81 mg) by mouth daily 90 tablet 3    Cyanocobalamin 5000 MCG TBDP Take 5,000 mcg by mouth daily      multivitamin w/minerals (MULTI-VITAMIN) tablet Take 1 tablet by mouth daily.      nitroGLYcerin (NITROSTAT) 0.4 MG sublingual tablet For chest pain place 1 tablet under the tongue every 5 minutes for 3 doses. If symptoms persist 5 minutes after 1st dose call 911. 25 tablet 3    potassium chloride ER (KLOR-CON M) 10 MEQ CR tablet Take 1 tablet (10 mEq) by mouth daily 90 tablet 3    rosuvastatin (CRESTOR) 40 MG tablet Take 1 tablet (40 mg) by mouth daily 90 tablet 3    VITAMIN D, CHOLECALCIFEROL, PO Take 1,000 Units by mouth 2 times daily Pt takes 2000 units in the am and 1000 units in the PM         ALLERGIES     Allergies   Allergen Reactions    Amoxicillin-Pot Clavulanate     Coreg [Carvedilol]      Brain fog    Metoprolol      fatigue       PAST MEDICAL HISTORY:  Past Medical History:   Diagnosis Date    CAD (coronary artery disease)     Cath 5/10/18-lateral MI,  VF arrest,  PCI with revascularization to OM- EVELYN placed; jailing of OM-1--dilated lad 30%; mild disease elsewhere    Hyperlipidemia     Hyperparathyroidism (H)     Infection due to 2019 novel coronavirus 08/2022    Kidney calculi     Migraine     Mitral regurgitation     PVC (premature ventricular contraction)     Raynaud's disease     Vitamin B12 deficiency (non anemic)     Vitamin D deficiency        PAST SURGICAL HISTORY:  Past Surgical History:   Procedure Laterality Date    CV CORONARY ANGIOGRAM N/A 1/5/2023    Procedure: Coronary Angiogram;  Surgeon: Kely Parnell MD;  Location: [facility] CARDIAC CATH LAB    CV HEART CATHETERIZATION WITH POSSIBLE INTERVENTION N/A 1/23/2019    Procedure: Coronary Angiogram with OCT and IVUS to RCA for ulcerated plaque in CTA;  Surgeon: Jeramy Carter MD;  Location: [facility] CARDIAC CATH LAB    PARATHYROIDECTOMY         FAMILY HISTORY:  Family History  "  Problem Relation Age of Onset    Arrhythmia Mother     Hyperlipidemia Mother     Hypertension Father     Myocardial Infarction Father 81       SOCIAL HISTORY:  Social History     Socioeconomic History    Marital status:      Spouse name: None    Number of children: None    Years of education: None    Highest education level: None   Tobacco Use    Smoking status: Never    Smokeless tobacco: Never   Substance and Sexual Activity    Alcohol use: Yes     Comment: 1 drink 1-2 times per week    Drug use: No   Other Topics Concern    Parent/sibling w/ CABG, MI or angioplasty before 65F 55M? No     Social Drivers of Health      Received from [place] [place]    Social Connections       Review of Systems:  Skin:        Eyes:       ENT:       Respiratory:       Cardiovascular:       Gastroenterology:      Genitourinary:       Musculoskeletal:       Neurologic:       Psychiatric:       Heme/Lymph/Imm:       Endocrine:         Physical Exam:  Vitals: /75   Pulse 91   Ht 1.854 m (6' 1\")   Wt 98 kg (216 lb)   BMI 28.50 kg/m        Constitutional:  cooperative, alert and oriented, well developed, well nourished, in no acute distress        Skin:  warm and dry to the touch, no apparent skin lesions or masses noted          Head:  normocephalic, no masses or lesions        Eyes:  pupils equal and round, conjunctivae and lids unremarkable, sclera white, no xanthalasma, EOMS intact, no nystagmus        Lymph:No Cervical lymphadenopathy present, No thyromegaly     ENT:           Neck:  carotid pulses are full and equal bilaterally, JVP normal, no carotid bruit        Respiratory:  normal breath sounds, clear to auscultation, normal A-P diameter, normal symmetry, normal respiratory excursion, no use of accessory muscles         Cardiac: regular rhythm, normal S1/S2, no S3 or S4, apical impulse not displaced, no murmurs, gallops or rubs           " "                                              GI:  abdomen soft, non-tender, BS normoactive, no mass, no HSM, no bruits        Extremities and Muscular Skeletal:  no deformities, clubbing, cyanosis, erythema observed, no edema              Neurological:  no gross motor deficits        Psych:  Alert and Oriented x 3      Recent Lab Results:  LIPID RESULTS:  Lab Results   Component Value Date    CHOL 142 04/03/2023    CHOL 147 05/07/2021    HDL 53 04/03/2023    HDL 56 05/07/2021    LDL 73 04/03/2023    LDL 79 05/07/2021    TRIG 82 04/03/2023    TRIG 60 05/07/2021       LIVER ENZYME RESULTS:  Lab Results   Component Value Date    ALT 25 02/07/2022    ALT 7 05/18/2020       CBC RESULTS:  Lab Results   Component Value Date    WBC 5.0 01/05/2023    WBC 4.8 01/23/2019    RBC 5.37 01/05/2023    RBC 4.95 01/23/2019    HGB 16.1 01/05/2023    HGB 14.9 01/23/2019    HCT 45.6 01/05/2023    HCT 43.6 01/23/2019    MCV 85 01/05/2023    MCV 88 01/23/2019    MCH 30.0 01/05/2023    MCH 30.1 01/23/2019    MCHC 35.3 01/05/2023    MCHC 34.2 01/23/2019    RDW 12.0 01/05/2023    RDW 12.7 01/23/2019     01/05/2023     01/23/2019       BMP RESULTS:  Lab Results   Component Value Date     01/05/2023     05/07/2021    POTASSIUM 4.1 01/13/2023    POTASSIUM 3.7 05/07/2021    CHLORIDE 106 01/05/2023    CHLORIDE 108 05/07/2021    CO2 27 01/05/2023    CO2 32 05/07/2021    ANIONGAP 7 01/05/2023    ANIONGAP 1 (L) 05/07/2021    GLC 94 01/05/2023    GLC 94 05/07/2021    BUN 20 01/05/2023    BUN 17 05/07/2021    CR 1.05 01/05/2023    CR 1.11 05/07/2021    GFRESTIMATED 83 01/05/2023    GFRESTIMATED 74 05/07/2021    GFRESTBLACK 86 05/07/2021    KELY 9.3 01/05/2023    KELY 9.2 05/07/2021        A1C RESULTS:  No results found for: \"A1C\"    INR RESULTS:  Lab Results   Component Value Date    INR 1.06 01/05/2023    INR 1.04 01/23/2019           CC  Josesito Angel MD  [address] [place] [address]  "

## 2024-12-18 NOTE — LETTER
12/18/2024    Josesito Angel MD  [address] [place] [address]    RE: Braulio Xiaoin       Dear Colleague,     I had the pleasure of seeing Braulio Bunch in the [facility].  HPI and Plan:   Pleasure of following up with Marco A we met him in 2018 he presented with chest pain and had a V-fib cardiac arrest on transport to the heart Cath Lab.  Heart cath was performed immediately after resuscitation of the right coronary artery and the LAD had trivial disease the left circumflex had a very large bifurcating OM and it was essentially occluded we were able to use 1 stent and get both arms open.  He has had a couple angiograms since that time the last of which was a couple years ago we reviewed that last angiogram and it is identical the stent is widely patent and there is still trivial disease in the LAD and right coronary artery.    He did have a stress echo that we reviewed today and again shows previous inferior posterior infarct preserved ejection fraction and no ischemia and a very good exercise time    He also had hyperparathyroidism and B12 deficiency he is on supplements for the B12 his neuropathy symptoms went away he is not anemic.  His calcium level and PTH were checked by his internist they are normal.  His cholesterol numbers are essentially normal his LDL is slightly above 70 our usual goal is below 70 he is on Crestor 40 I will leave it to the patient and Dr. Angel if they want to consider adding Zetia to the Crestor but the truth is 5 or 6 years later his angiograms shows essentially the same coronary anatomy physical exam is unremarkable.  At this time I make no changes we will leave the possible Zetia addition to the patient and Dr. Angel and we will see the patient back in 2 years for routine follow-up.  Today's visit was 40 minutes    No orders of the defined types were placed in this encounter.    Orders Placed This Encounter   Medications     multivitamin w/minerals  (MULTI-VITAMIN) tablet     Sig: Take 1 tablet by mouth daily.     There are no discontinued medications.      No diagnosis found.    CURRENT MEDICATIONS:  Current Outpatient Medications   Medication Sig Dispense Refill     aspirin (ASA) 81 MG EC tablet Take 1 tablet (81 mg) by mouth daily 90 tablet 3     Cyanocobalamin 5000 MCG TBDP Take 5,000 mcg by mouth daily       multivitamin w/minerals (MULTI-VITAMIN) tablet Take 1 tablet by mouth daily.       nitroGLYcerin (NITROSTAT) 0.4 MG sublingual tablet For chest pain place 1 tablet under the tongue every 5 minutes for 3 doses. If symptoms persist 5 minutes after 1st dose call 911. 25 tablet 3     potassium chloride ER (KLOR-CON M) 10 MEQ CR tablet Take 1 tablet (10 mEq) by mouth daily 90 tablet 3     rosuvastatin (CRESTOR) 40 MG tablet Take 1 tablet (40 mg) by mouth daily 90 tablet 3     VITAMIN D, CHOLECALCIFEROL, PO Take 1,000 Units by mouth 2 times daily Pt takes 2000 units in the am and 1000 units in the PM         ALLERGIES     Allergies   Allergen Reactions     Amoxicillin-Pot Clavulanate      Coreg [Carvedilol]      Brain fog     Metoprolol      fatigue       PAST MEDICAL HISTORY:  Past Medical History:   Diagnosis Date     CAD (coronary artery disease)     Cath 5/10/18-lateral MI,  VF arrest,  PCI with revascularization to OM- EVELYN placed; jailing of OM-1--dilated lad 30%; mild disease elsewhere     Hyperlipidemia      Hyperparathyroidism (H)      Infection due to 2019 novel coronavirus 08/2022     Kidney calculi      Migraine      Mitral regurgitation      PVC (premature ventricular contraction)      Raynaud's disease      Vitamin B12 deficiency (non anemic)      Vitamin D deficiency        PAST SURGICAL HISTORY:  Past Surgical History:   Procedure Laterality Date     CV CORONARY ANGIOGRAM N/A 1/5/2023    Procedure: Coronary Angiogram;  Surgeon: Kely Parnell MD;  Location: [facility] CARDIAC CATH LAB     CV HEART CATHETERIZATION WITH POSSIBLE  "INTERVENTION N/A 1/23/2019    Procedure: Coronary Angiogram with OCT and IVUS to RCA for ulcerated plaque in CTA;  Surgeon: Jeramy Carter MD;  Location:  HEART CARDIAC CATH LAB     PARATHYROIDECTOMY         FAMILY HISTORY:  Family History   Problem Relation Age of Onset     Arrhythmia Mother      Hyperlipidemia Mother      Hypertension Father      Myocardial Infarction Father 81       SOCIAL HISTORY:  Social History     Socioeconomic History     Marital status:      Spouse name: None     Number of children: None     Years of education: None     Highest education level: None   Tobacco Use     Smoking status: Never     Smokeless tobacco: Never   Substance and Sexual Activity     Alcohol use: Yes     Comment: 1 drink 1-2 times per week     Drug use: No   Other Topics Concern     Parent/sibling w/ CABG, MI or angioplasty before 65F 55M? No     Social Drivers of Health      Received from [employer] [employer]    Social Connections       Review of Systems:  Skin:        Eyes:       ENT:       Respiratory:       Cardiovascular:       Gastroenterology:      Genitourinary:       Musculoskeletal:       Neurologic:       Psychiatric:       Heme/Lymph/Imm:       Endocrine:         Physical Exam:  Vitals: /75   Pulse 91   Ht 1.854 m (6' 1\")   Wt 98 kg (216 lb)   BMI 28.50 kg/m        Constitutional:  cooperative, alert and oriented, well developed, well nourished, in no acute distress        Skin:  warm and dry to the touch, no apparent skin lesions or masses noted          Head:  normocephalic, no masses or lesions        Eyes:  pupils equal and round, conjunctivae and lids unremarkable, sclera white, no xanthalasma, EOMS intact, no nystagmus        Lymph:No Cervical lymphadenopathy present, No thyromegaly     ENT:           Neck:  carotid pulses are full and equal bilaterally, JVP normal, no carotid bruit        Respiratory:  normal " breath sounds, clear to auscultation, normal A-P diameter, normal symmetry, normal respiratory excursion, no use of accessory muscles         Cardiac: regular rhythm, normal S1/S2, no S3 or S4, apical impulse not displaced, no murmurs, gallops or rubs                                                         GI:  abdomen soft, non-tender, BS normoactive, no mass, no HSM, no bruits        Extremities and Muscular Skeletal:  no deformities, clubbing, cyanosis, erythema observed, no edema              Neurological:  no gross motor deficits        Psych:  Alert and Oriented x 3      Recent Lab Results:  LIPID RESULTS:  Lab Results   Component Value Date    CHOL 142 04/03/2023    CHOL 147 05/07/2021    HDL 53 04/03/2023    HDL 56 05/07/2021    LDL 73 04/03/2023    LDL 79 05/07/2021    TRIG 82 04/03/2023    TRIG 60 05/07/2021       LIVER ENZYME RESULTS:  Lab Results   Component Value Date    ALT 25 02/07/2022    ALT 7 05/18/2020       CBC RESULTS:  Lab Results   Component Value Date    WBC 5.0 01/05/2023    WBC 4.8 01/23/2019    RBC 5.37 01/05/2023    RBC 4.95 01/23/2019    HGB 16.1 01/05/2023    HGB 14.9 01/23/2019    HCT 45.6 01/05/2023    HCT 43.6 01/23/2019    MCV 85 01/05/2023    MCV 88 01/23/2019    MCH 30.0 01/05/2023    MCH 30.1 01/23/2019    MCHC 35.3 01/05/2023    MCHC 34.2 01/23/2019    RDW 12.0 01/05/2023    RDW 12.7 01/23/2019     01/05/2023     01/23/2019       BMP RESULTS:  Lab Results   Component Value Date     01/05/2023     05/07/2021    POTASSIUM 4.1 01/13/2023    POTASSIUM 3.7 05/07/2021    CHLORIDE 106 01/05/2023    CHLORIDE 108 05/07/2021    CO2 27 01/05/2023    CO2 32 05/07/2021    ANIONGAP 7 01/05/2023    ANIONGAP 1 (L) 05/07/2021    GLC 94 01/05/2023    GLC 94 05/07/2021    BUN 20 01/05/2023    BUN 17 05/07/2021    CR 1.05 01/05/2023    CR 1.11 05/07/2021    GFRESTIMATED 83 01/05/2023    GFRESTIMATED 74 05/07/2021    GFRESTBLACK 86 05/07/2021    KELY 9.3 01/05/2023    KELY  "9.2 05/07/2021        A1C RESULTS:  No results found for: \"A1C\"    INR RESULTS:  Lab Results   Component Value Date    INR 1.06 01/05/2023    INR 1.04 01/23/2019           CC  Josesito Angel MD  [address] [id number]      Thank you for allowing me to participate in the care of your patient.      Sincerely,     Jeramy Carter MD     [facility]  cc:   Josesito Angel MD  [address] [id number]      "

## 2025-01-04 ENCOUNTER — HEALTH MAINTENANCE LETTER (OUTPATIENT)
Age: 60
End: 2025-01-04

## 2025-02-04 ENCOUNTER — APPOINTMENT (OUTPATIENT)
Dept: GENERAL RADIOLOGY | Facility: CLINIC | Age: 60
End: 2025-02-04
Attending: EMERGENCY MEDICINE
Payer: COMMERCIAL

## 2025-02-04 ENCOUNTER — HOSPITAL ENCOUNTER (EMERGENCY)
Facility: CLINIC | Age: 60
Discharge: HOME | End: 2025-02-04
Attending: EMERGENCY MEDICINE | Admitting: EMERGENCY MEDICINE
Payer: COMMERCIAL

## 2025-02-04 VITALS
HEIGHT: 72 IN | WEIGHT: 200 LBS | RESPIRATION RATE: 22 BRPM | OXYGEN SATURATION: 99 % | DIASTOLIC BLOOD PRESSURE: 58 MMHG | TEMPERATURE: 97.4 F | BODY MASS INDEX: 27.09 KG/M2 | HEART RATE: 75 BPM | SYSTOLIC BLOOD PRESSURE: 134 MMHG

## 2025-02-04 DIAGNOSIS — J10.1 INFLUENZA A: ICD-10-CM

## 2025-02-04 LAB
ALBUMIN SERPL BCG-MCNC: 4.2 G/DL (ref 3.5–5.2)
ALP SERPL-CCNC: 58 U/L (ref 40–150)
ALT SERPL W P-5'-P-CCNC: 21 U/L (ref 0–70)
ANION GAP SERPL CALCULATED.3IONS-SCNC: 12 MMOL/L (ref 7–15)
AST SERPL W P-5'-P-CCNC: 22 U/L (ref 0–45)
BASOPHILS # BLD AUTO: 0 10E3/UL (ref 0–0.2)
BASOPHILS NFR BLD AUTO: 0 %
BILIRUB SERPL-MCNC: 0.7 MG/DL
BUN SERPL-MCNC: 15.6 MG/DL (ref 6–20)
CALCIUM SERPL-MCNC: 9.4 MG/DL (ref 8.8–10.4)
CHLORIDE SERPL-SCNC: 106 MMOL/L (ref 98–107)
CREAT SERPL-MCNC: 1.05 MG/DL (ref 0.67–1.17)
EGFRCR SERPLBLD CKD-EPI 2021: 82 ML/MIN/1.73M2
EOSINOPHIL # BLD AUTO: 0.2 10E3/UL (ref 0–0.7)
EOSINOPHIL NFR BLD AUTO: 4 %
ERYTHROCYTE [DISTWIDTH] IN BLOOD BY AUTOMATED COUNT: 11.8 % (ref 10–15)
FLUAV RNA SPEC QL NAA+PROBE: POSITIVE
FLUBV RNA RESP QL NAA+PROBE: NEGATIVE
GLUCOSE SERPL-MCNC: 90 MG/DL (ref 70–99)
HCO3 SERPL-SCNC: 24 MMOL/L (ref 22–29)
HCT VFR BLD AUTO: 49.3 % (ref 40–53)
HGB BLD-MCNC: 16.8 G/DL (ref 13.3–17.7)
HOLD SPECIMEN: NORMAL
IMM GRANULOCYTES # BLD: 0.1 10E3/UL
IMM GRANULOCYTES NFR BLD: 1 %
LYMPHOCYTES # BLD AUTO: 1.5 10E3/UL (ref 0.8–5.3)
LYMPHOCYTES NFR BLD AUTO: 30 %
MCH RBC QN AUTO: 29.3 PG (ref 26.5–33)
MCHC RBC AUTO-ENTMCNC: 34.1 G/DL (ref 31.5–36.5)
MCV RBC AUTO: 86 FL (ref 78–100)
MONOCYTES # BLD AUTO: 0.4 10E3/UL (ref 0–1.3)
MONOCYTES NFR BLD AUTO: 7 %
NEUTROPHILS # BLD AUTO: 2.8 10E3/UL (ref 1.6–8.3)
NEUTROPHILS NFR BLD AUTO: 57 %
NRBC # BLD AUTO: 0 10E3/UL
NRBC BLD AUTO-RTO: 0 /100
PLATELET # BLD AUTO: 315 10E3/UL (ref 150–450)
POTASSIUM SERPL-SCNC: 4 MMOL/L (ref 3.4–5.3)
PROT SERPL-MCNC: 7.2 G/DL (ref 6.4–8.3)
RBC # BLD AUTO: 5.73 10E6/UL (ref 4.4–5.9)
RSV RNA SPEC NAA+PROBE: NEGATIVE
SARS-COV-2 RNA RESP QL NAA+PROBE: NEGATIVE
SODIUM SERPL-SCNC: 142 MMOL/L (ref 135–145)
WBC # BLD AUTO: 4.9 10E3/UL (ref 4–11)

## 2025-02-04 PROCEDURE — 84155 ASSAY OF PROTEIN SERUM: CPT | Performed by: EMERGENCY MEDICINE

## 2025-02-04 PROCEDURE — 71046 X-RAY EXAM CHEST 2 VIEWS: CPT

## 2025-02-04 PROCEDURE — 85004 AUTOMATED DIFF WBC COUNT: CPT | Performed by: EMERGENCY MEDICINE

## 2025-02-04 PROCEDURE — 82435 ASSAY OF BLOOD CHLORIDE: CPT | Performed by: EMERGENCY MEDICINE

## 2025-02-04 PROCEDURE — 82565 ASSAY OF CREATININE: CPT | Performed by: EMERGENCY MEDICINE

## 2025-02-04 PROCEDURE — 85025 COMPLETE CBC W/AUTO DIFF WBC: CPT | Performed by: EMERGENCY MEDICINE

## 2025-02-04 PROCEDURE — 85018 HEMOGLOBIN: CPT | Performed by: EMERGENCY MEDICINE

## 2025-02-04 PROCEDURE — 80053 COMPREHEN METABOLIC PANEL: CPT | Performed by: EMERGENCY MEDICINE

## 2025-02-04 PROCEDURE — 87637 SARSCOV2&INF A&B&RSV AMP PRB: CPT | Performed by: EMERGENCY MEDICINE

## 2025-02-04 PROCEDURE — 99284 EMERGENCY DEPT VISIT MOD MDM: CPT | Mod: 25

## 2025-02-04 PROCEDURE — 36415 COLL VENOUS BLD VENIPUNCTURE: CPT | Performed by: EMERGENCY MEDICINE

## 2025-02-04 ASSESSMENT — ACTIVITIES OF DAILY LIVING (ADL)
ADLS_ACUITY_SCORE: 41

## 2025-02-04 NOTE — ED PROVIDER NOTES
Emergency Department Note      History of Present Illness     Chief Complaint   Cough and Shortness of Breath      HPI   Braulio Bunch is a 59 year old male with a history of STEMI, CAD, hypertension and hyperlipidemia presenting with cough and shortness of breath. The patient reports being symptomatic for the past week. He had been having 5 days of fevers, subsequently, he developed a cough and shortness of breath. He has been using rosuvotin and mucinex with no relief. Patient is not a smoker. He denies other symptoms.     Independent Historian   None    Review of External Notes   None    Past Medical History     Medical History and Problem List   CAD (coronary artery disease)  Hyperlipidemia  Hyperparathyroidism  Infection due to 2019 novel coronavirus  Kidney calculi  Migraine  Mitral regurgitation  PVC (premature ventricular contraction)  Raynaud's disease  Vitamin B12 deficiency (non anemic)  Vitamin D deficiency    Medications   Aspirin (asa) 81 mg   Nitroglycerin   Rosuvastatin     Surgical History   Coronary angiogram  Heart Catheterization  Parathyroidectomy    Physical Exam     Patient Vitals for the past 24 hrs:   BP Temp Temp src Pulse Resp SpO2 Height Weight   02/04/25 1018 134/58 97.4  F (36.3  C) Temporal 75 22 99 % 1.829 m (6') 90.7 kg (200 lb)     Physical Exam  Nursing note and vitals reviewed.  Constitutional:  Alert.  Appears comfortable.   Eyes:    Conjunctivae are normal.      Right eye exhibits no discharge. Left eye exhibits no discharge.   Cardiovascular:  Normal rate, regular rhythm.      Normal heart sounds.  Pulmonary/Chest:  Breath sounds . No respiratory distress.     No stridor. Frequent cough  Lymph:   Normal.  Neurological:   Alert and appropriate. No focal weakness.  Skin:    Skin is warm and dry. No rash noted. No diaphoresis.      Diagnostics     Lab Results   Labs Ordered and Resulted from Time of ED Arrival to Time of ED Departure   INFLUENZA A/B, RSV AND SARS-COV2 PCR -  Abnormal       Result Value    Influenza A PCR Positive (*)     Influenza B PCR Negative      RSV PCR Negative      SARS CoV2 PCR Negative     COMPREHENSIVE METABOLIC PANEL - Normal    Sodium 142      Potassium 4.0      Carbon Dioxide (CO2) 24      Anion Gap 12      Urea Nitrogen 15.6      Creatinine 1.05      GFR Estimate 82      Calcium 9.4      Chloride 106      Glucose 90      Alkaline Phosphatase 58      AST 22      ALT 21      Protein Total 7.2      Albumin 4.2      Bilirubin Total 0.7     CBC WITH PLATELETS AND DIFFERENTIAL    WBC Count 4.9      RBC Count 5.73      Hemoglobin 16.8      Hematocrit 49.3      MCV 86      MCH 29.3      MCHC 34.1      RDW 11.8      Platelet Count 315      % Neutrophils 57      % Lymphocytes 30      % Monocytes 7      % Eosinophils 4      % Basophils 0      % Immature Granulocytes 1      NRBCs per 100 WBC 0      Absolute Neutrophils 2.8      Absolute Lymphocytes 1.5      Absolute Monocytes 0.4      Absolute Eosinophils 0.2      Absolute Basophils 0.0      Absolute Immature Granulocytes 0.1      Absolute NRBCs 0.0         Imaging   Chest XR,  PA & LAT   Final Result   IMPRESSION: Negative chest.        Independent Interpretation   I reviewed the X-ray of chest, it is negative.     ED Course      Medications Administered   Medications - No data to display    Procedures   Procedures     Discussion of Management   None    ED Course   ED Course as of 02/04/25 1322   Tue Feb 04, 2025   1313 I obtained the history and examined the patient as noted above.         Additional Documentation  None    Medical Decision Making / Diagnosis     CMS Diagnoses: None    MIPS       None    MDM   Braulio Bunch is a 59 year old male was in with a cough for a week.  Lung sounds are clear, oxygen level is normal.  His fevers were early on in the course of his illness but now have resolved.  His lung sounds are clear chest x-ray is normal white count normal the rest of his labs are normal other than a  positive influenza A test.  He is outside the window for Tamiflu.  Supportive therapy is recommended at this time as he is doing well otherwise.  He was encouraged to call his doctor if he gets worse.    Push fluids, get plenty rest, take a lot of vitamin C, over-the-counter medications as needed.  Contact your doctor if symptoms are not improving over the next week.    Disposition   The patient was discharged.     Diagnosis     ICD-10-CM    1. Influenza A  J10.1            Discharge Medications   Discharge Medication List as of 2/4/2025  1:13 PM            Scribe Disclosure:  I, Shaunna Brock, am serving as a scribe at 1:18 PM on 2/4/2025 to document services personally performed by Adele Agee MD based on my observations and the provider's statements to me.        Adele Agee MD  02/04/25 [id number]

## 2025-02-04 NOTE — ED TRIAGE NOTES
Pt reports increased cough, shortness of breath and fever since 1/24. Pt called his primary care provider who advised him due to history of pneumonia should come to ED

## 2025-02-04 NOTE — DISCHARGE INSTRUCTIONS
Push fluids, get plenty rest, take a lot of vitamin C, over-the-counter medications as needed.  Contact your doctor if symptoms are not improving over the next week.

## (undated) DEVICE — 6FR X 100CM PERFORMA DIAGNOSTIC CATHETER, JUDKINS LEFT, JL4.0, 1.9CM SHORT TIP, 0.038IN MAX GUIDEWIRE

## (undated) DEVICE — TOTE ANGIO CORP PC15AT SAN32CC83O

## (undated) DEVICE — GUIDEWIRE VASC 0.014INX190CM J TIP CGRXT190HJ

## (undated) DEVICE — CATH ANGIO INFINITI 3DRC 4FRX100CM 538476

## (undated) DEVICE — DEFIB PRO-PADZ LVP LQD GEL ADULT 8900-2105-01

## (undated) DEVICE — INTRO SHEATH 4FRX10CM PINNACLE RSS402

## (undated) DEVICE — CLOSURE ANGIOSEAL 6FR 610130

## (undated) DEVICE — KIT HAND CONTROL ANGIOTOUCH ACIST 65CM AT-P65

## (undated) DEVICE — SLEEVE TR BAND RADIAL COMPRESSION DEVICE 24CM TRB24-REG

## (undated) DEVICE — CATH DIAG 4FR ANG PIG 538453S

## (undated) DEVICE — SYR ANGIOGRAPHY MULTIUSE KIT ACIST 014612

## (undated) DEVICE — INTRO GLIDESHEATH SLENDER 6FR 10X45CM 60-1060

## (undated) DEVICE — NDL PERC ENTRY THINWALL 18GA 7.0" G00166

## (undated) DEVICE — CATH LAUNCHER 6FR JR 4.0 LA6JR40

## (undated) DEVICE — INTRODUCER SHEATH GREEN 6.5FRX11CM .038IN PSI-6F-11-038ACT

## (undated) DEVICE — KIT LG BORE TOUHY ACCESS PLUS MAP152

## (undated) DEVICE — MANIFOLD KIT ANGIO AUTOMATED 014613

## (undated) DEVICE — 6FR X 100CM PERFORMA DIAGNOSTIC CATHETER, JUDKINS RIGHT, JR4.0, 1.9CM TIP, 0.038IN MAX GUIDEWIRE

## (undated) DEVICE — KT CATH DRAGONFLY INTVASC IMG

## (undated) DEVICE — WIRE GUIDE 0.035"X260CM SAFE-T-J EXCHANGE G00517

## (undated) DEVICE — CATH DIAG 4FR JL 4.5 538417

## (undated) RX ORDER — FENTANYL CITRATE 50 UG/ML
INJECTION, SOLUTION INTRAMUSCULAR; INTRAVENOUS
Status: DISPENSED
Start: 2019-01-23

## (undated) RX ORDER — ADENOSINE 3 MG/ML
INJECTION, SOLUTION INTRAVENOUS
Status: DISPENSED
Start: 2018-05-10

## (undated) RX ORDER — HEPARIN SODIUM 1000 [USP'U]/ML
INJECTION, SOLUTION INTRAVENOUS; SUBCUTANEOUS
Status: DISPENSED
Start: 2018-05-10

## (undated) RX ORDER — HEPARIN SODIUM 1000 [USP'U]/ML
INJECTION, SOLUTION INTRAVENOUS; SUBCUTANEOUS
Status: DISPENSED
Start: 2023-01-05

## (undated) RX ORDER — ONDANSETRON 2 MG/ML
INJECTION INTRAMUSCULAR; INTRAVENOUS
Status: DISPENSED
Start: 2018-05-10

## (undated) RX ORDER — HEPARIN SODIUM 200 [USP'U]/100ML
INJECTION, SOLUTION INTRAVENOUS
Status: DISPENSED
Start: 2023-01-05

## (undated) RX ORDER — HEPARIN SODIUM 1000 [USP'U]/ML
INJECTION, SOLUTION INTRAVENOUS; SUBCUTANEOUS
Status: DISPENSED
Start: 2019-01-23

## (undated) RX ORDER — LIDOCAINE HYDROCHLORIDE 10 MG/ML
INJECTION, SOLUTION EPIDURAL; INFILTRATION; INTRACAUDAL; PERINEURAL
Status: DISPENSED
Start: 2018-05-10

## (undated) RX ORDER — METOPROLOL TARTRATE 50 MG
TABLET ORAL
Status: DISPENSED
Start: 2019-01-17

## (undated) RX ORDER — VERAPAMIL HYDROCHLORIDE 2.5 MG/ML
INJECTION, SOLUTION INTRAVENOUS
Status: DISPENSED
Start: 2023-01-05

## (undated) RX ORDER — POTASSIUM CHLORIDE 1500 MG/1
TABLET, EXTENDED RELEASE ORAL
Status: DISPENSED
Start: 2023-01-05

## (undated) RX ORDER — LIDOCAINE HYDROCHLORIDE 10 MG/ML
INJECTION, SOLUTION EPIDURAL; INFILTRATION; INTRACAUDAL; PERINEURAL
Status: DISPENSED
Start: 2019-01-23

## (undated) RX ORDER — FENTANYL CITRATE 50 UG/ML
INJECTION, SOLUTION INTRAMUSCULAR; INTRAVENOUS
Status: DISPENSED
Start: 2018-05-10

## (undated) RX ORDER — NITROGLYCERIN 0.4 MG/1
TABLET SUBLINGUAL
Status: DISPENSED
Start: 2019-01-17

## (undated) RX ORDER — LIDOCAINE HYDROCHLORIDE 10 MG/ML
INJECTION, SOLUTION EPIDURAL; INFILTRATION; INTRACAUDAL; PERINEURAL
Status: DISPENSED
Start: 2023-01-05

## (undated) RX ORDER — EPTIFIBATIDE 2 MG/ML
INJECTION, SOLUTION INTRAVENOUS
Status: DISPENSED
Start: 2018-05-10

## (undated) RX ORDER — POTASSIUM CHLORIDE 1500 MG/1
TABLET, EXTENDED RELEASE ORAL
Status: DISPENSED
Start: 2019-01-23

## (undated) RX ORDER — ATROPINE SULFATE 0.1 MG/ML
INJECTION INTRAVENOUS
Status: DISPENSED
Start: 2018-05-10

## (undated) RX ORDER — SODIUM NITROPRUSSIDE 25 MG/ML
INJECTION INTRAVENOUS
Status: DISPENSED
Start: 2018-05-10

## (undated) RX ORDER — FENTANYL CITRATE 50 UG/ML
INJECTION, SOLUTION INTRAMUSCULAR; INTRAVENOUS
Status: DISPENSED
Start: 2023-01-05

## (undated) RX ORDER — NITROGLYCERIN 5 MG/ML
VIAL (ML) INTRAVENOUS
Status: DISPENSED
Start: 2023-01-05

## (undated) RX ORDER — METOPROLOL TARTRATE 1 MG/ML
INJECTION, SOLUTION INTRAVENOUS
Status: DISPENSED
Start: 2018-05-10